# Patient Record
Sex: MALE | Race: WHITE | NOT HISPANIC OR LATINO | Employment: OTHER | URBAN - METROPOLITAN AREA
[De-identification: names, ages, dates, MRNs, and addresses within clinical notes are randomized per-mention and may not be internally consistent; named-entity substitution may affect disease eponyms.]

---

## 2017-01-18 ENCOUNTER — ALLSCRIPTS OFFICE VISIT (OUTPATIENT)
Dept: OTHER | Facility: OTHER | Age: 62
End: 2017-01-18

## 2017-01-18 LAB
BILIRUB UR QL STRIP: NORMAL
CLARITY UR: NORMAL
COLOR UR: YELLOW
GLUCOSE (HISTORICAL): NORMAL
HGB UR QL STRIP.AUTO: NORMAL
KETONES UR STRIP-MCNC: NORMAL MG/DL
LEUKOCYTE ESTERASE UR QL STRIP: NORMAL
NITRITE UR QL STRIP: NORMAL
PH UR STRIP.AUTO: 7.5 [PH]
PROT UR STRIP-MCNC: NORMAL MG/DL
SP GR UR STRIP.AUTO: 1
UROBILINOGEN UR QL STRIP.AUTO: NORMAL

## 2017-02-14 ENCOUNTER — TRANSCRIBE ORDERS (OUTPATIENT)
Dept: ADMINISTRATIVE | Facility: HOSPITAL | Age: 62
End: 2017-02-14

## 2017-02-14 ENCOUNTER — APPOINTMENT (OUTPATIENT)
Dept: LAB | Facility: MEDICAL CENTER | Age: 62
End: 2017-02-14
Payer: COMMERCIAL

## 2017-02-14 ENCOUNTER — ALLSCRIPTS OFFICE VISIT (OUTPATIENT)
Dept: OTHER | Facility: OTHER | Age: 62
End: 2017-02-14

## 2017-02-14 DIAGNOSIS — G35 MULTIPLE SCLEROSIS (HCC): Primary | ICD-10-CM

## 2017-02-14 DIAGNOSIS — R94.4 NONSPECIFIC ABNORMAL RESULTS OF KIDNEY FUNCTION STUDY: ICD-10-CM

## 2017-02-14 DIAGNOSIS — G35 MULTIPLE SCLEROSIS (HCC): ICD-10-CM

## 2017-02-14 LAB
ALBUMIN SERPL BCP-MCNC: 4 G/DL (ref 3.5–5)
ALP SERPL-CCNC: 95 U/L (ref 46–116)
ALT SERPL W P-5'-P-CCNC: 35 U/L (ref 12–78)
ANION GAP SERPL CALCULATED.3IONS-SCNC: 9 MMOL/L (ref 4–13)
AST SERPL W P-5'-P-CCNC: 30 U/L (ref 5–45)
BASOPHILS # BLD AUTO: 0.01 THOUSANDS/ΜL (ref 0–0.1)
BASOPHILS NFR BLD AUTO: 0 % (ref 0–1)
BILIRUB DIRECT SERPL-MCNC: 0.09 MG/DL (ref 0–0.2)
BILIRUB SERPL-MCNC: 0.19 MG/DL (ref 0.2–1)
BUN SERPL-MCNC: 16 MG/DL (ref 5–25)
CALCIUM SERPL-MCNC: 9.7 MG/DL (ref 8.3–10.1)
CHLORIDE SERPL-SCNC: 104 MMOL/L (ref 100–108)
CO2 SERPL-SCNC: 29 MMOL/L (ref 21–32)
CREAT SERPL-MCNC: 1.18 MG/DL (ref 0.6–1.3)
EOSINOPHIL # BLD AUTO: 0.06 THOUSAND/ΜL (ref 0–0.61)
EOSINOPHIL NFR BLD AUTO: 2 % (ref 0–6)
ERYTHROCYTE [DISTWIDTH] IN BLOOD BY AUTOMATED COUNT: 12.9 % (ref 11.6–15.1)
GFR SERPL CREATININE-BSD FRML MDRD: >60 ML/MIN/1.73SQ M
GLUCOSE SERPL-MCNC: 92 MG/DL (ref 65–140)
HCT VFR BLD AUTO: 35.9 % (ref 36.5–49.3)
HGB BLD-MCNC: 11.7 G/DL (ref 12–17)
LYMPHOCYTES # BLD AUTO: 1.02 THOUSANDS/ΜL (ref 0.6–4.47)
LYMPHOCYTES NFR BLD AUTO: 26 % (ref 14–44)
MCH RBC QN AUTO: 30.7 PG (ref 26.8–34.3)
MCHC RBC AUTO-ENTMCNC: 32.6 G/DL (ref 31.4–37.4)
MCV RBC AUTO: 94 FL (ref 82–98)
MONOCYTES # BLD AUTO: 0.43 THOUSAND/ΜL (ref 0.17–1.22)
MONOCYTES NFR BLD AUTO: 11 % (ref 4–12)
NEUTROPHILS # BLD AUTO: 2.45 THOUSANDS/ΜL (ref 1.85–7.62)
NEUTS SEG NFR BLD AUTO: 61 % (ref 43–75)
NRBC BLD AUTO-RTO: 0 /100 WBCS
PLATELET # BLD AUTO: 254 THOUSANDS/UL (ref 149–390)
PMV BLD AUTO: 10.5 FL (ref 8.9–12.7)
POTASSIUM SERPL-SCNC: 4.2 MMOL/L (ref 3.5–5.3)
PROT SERPL-MCNC: 8.4 G/DL (ref 6.4–8.2)
RBC # BLD AUTO: 3.81 MILLION/UL (ref 3.88–5.62)
SODIUM SERPL-SCNC: 142 MMOL/L (ref 136–145)
WBC # BLD AUTO: 3.97 THOUSAND/UL (ref 4.31–10.16)

## 2017-02-14 PROCEDURE — 36415 COLL VENOUS BLD VENIPUNCTURE: CPT

## 2017-02-14 PROCEDURE — 80177 DRUG SCRN QUAN LEVETIRACETAM: CPT

## 2017-02-14 PROCEDURE — 85025 COMPLETE CBC W/AUTO DIFF WBC: CPT

## 2017-02-14 PROCEDURE — 82248 BILIRUBIN DIRECT: CPT

## 2017-02-14 PROCEDURE — 80053 COMPREHEN METABOLIC PANEL: CPT

## 2017-02-15 ENCOUNTER — GENERIC CONVERSION - ENCOUNTER (OUTPATIENT)
Dept: OTHER | Facility: OTHER | Age: 62
End: 2017-02-15

## 2017-02-16 LAB — LEVETIRACETAM SERPL-MCNC: 26.6 UG/ML (ref 10–40)

## 2017-03-13 ENCOUNTER — APPOINTMENT (OUTPATIENT)
Dept: LAB | Facility: MEDICAL CENTER | Age: 62
End: 2017-03-13
Payer: COMMERCIAL

## 2017-03-13 ENCOUNTER — TRANSCRIBE ORDERS (OUTPATIENT)
Dept: ADMINISTRATIVE | Facility: HOSPITAL | Age: 62
End: 2017-03-13

## 2017-03-13 DIAGNOSIS — Z79.891 ENCOUNTER FOR LONG-TERM METHADONE USE: ICD-10-CM

## 2017-03-13 DIAGNOSIS — R94.4 NONSPECIFIC ABNORMAL RESULTS OF KIDNEY FUNCTION STUDY: ICD-10-CM

## 2017-03-13 DIAGNOSIS — Z79.891 ENCOUNTER FOR LONG-TERM METHADONE USE: Primary | ICD-10-CM

## 2017-03-13 LAB
ALBUMIN SERPL BCP-MCNC: 3.9 G/DL (ref 3.5–5)
ALP SERPL-CCNC: 81 U/L (ref 46–116)
ALT SERPL W P-5'-P-CCNC: 23 U/L (ref 12–78)
ANION GAP SERPL CALCULATED.3IONS-SCNC: 9 MMOL/L (ref 4–13)
AST SERPL W P-5'-P-CCNC: 18 U/L (ref 5–45)
BASOPHILS # BLD AUTO: 0.05 THOUSANDS/ΜL (ref 0–0.1)
BASOPHILS NFR BLD AUTO: 1 % (ref 0–1)
BILIRUB DIRECT SERPL-MCNC: 0.1 MG/DL (ref 0–0.2)
BILIRUB SERPL-MCNC: 0.22 MG/DL (ref 0.2–1)
BUN SERPL-MCNC: 19 MG/DL (ref 5–25)
CALCIUM SERPL-MCNC: 9.9 MG/DL (ref 8.3–10.1)
CHLORIDE SERPL-SCNC: 100 MMOL/L (ref 100–108)
CO2 SERPL-SCNC: 31 MMOL/L (ref 21–32)
CREAT SERPL-MCNC: 1.26 MG/DL (ref 0.6–1.3)
EOSINOPHIL # BLD AUTO: 0.04 THOUSAND/ΜL (ref 0–0.61)
EOSINOPHIL NFR BLD AUTO: 1 % (ref 0–6)
ERYTHROCYTE [DISTWIDTH] IN BLOOD BY AUTOMATED COUNT: 12.4 % (ref 11.6–15.1)
GFR SERPL CREATININE-BSD FRML MDRD: 57.6 ML/MIN/1.73SQ M
GLUCOSE SERPL-MCNC: 102 MG/DL (ref 65–140)
HCT VFR BLD AUTO: 37.1 % (ref 36.5–49.3)
HGB BLD-MCNC: 12.2 G/DL (ref 12–17)
LYMPHOCYTES # BLD AUTO: 1.15 THOUSANDS/ΜL (ref 0.6–4.47)
LYMPHOCYTES NFR BLD AUTO: 21 % (ref 14–44)
MCH RBC QN AUTO: 31 PG (ref 26.8–34.3)
MCHC RBC AUTO-ENTMCNC: 32.9 G/DL (ref 31.4–37.4)
MCV RBC AUTO: 94 FL (ref 82–98)
MONOCYTES # BLD AUTO: 0.58 THOUSAND/ΜL (ref 0.17–1.22)
MONOCYTES NFR BLD AUTO: 10 % (ref 4–12)
NEUTROPHILS # BLD AUTO: 3.77 THOUSANDS/ΜL (ref 1.85–7.62)
NEUTS SEG NFR BLD AUTO: 67 % (ref 43–75)
NRBC BLD AUTO-RTO: 0 /100 WBCS
PLATELET # BLD AUTO: 307 THOUSANDS/UL (ref 149–390)
PMV BLD AUTO: 10.7 FL (ref 8.9–12.7)
POTASSIUM SERPL-SCNC: 4.5 MMOL/L (ref 3.5–5.3)
PROT SERPL-MCNC: 8.9 G/DL (ref 6.4–8.2)
RBC # BLD AUTO: 3.94 MILLION/UL (ref 3.88–5.62)
SODIUM SERPL-SCNC: 140 MMOL/L (ref 136–145)
WBC # BLD AUTO: 5.6 THOUSAND/UL (ref 4.31–10.16)

## 2017-03-13 PROCEDURE — 36415 COLL VENOUS BLD VENIPUNCTURE: CPT

## 2017-03-13 PROCEDURE — 82248 BILIRUBIN DIRECT: CPT

## 2017-03-13 PROCEDURE — 85025 COMPLETE CBC W/AUTO DIFF WBC: CPT

## 2017-03-13 PROCEDURE — 80053 COMPREHEN METABOLIC PANEL: CPT

## 2017-06-08 ENCOUNTER — APPOINTMENT (EMERGENCY)
Dept: RADIOLOGY | Facility: HOSPITAL | Age: 62
End: 2017-06-08
Payer: COMMERCIAL

## 2017-06-08 ENCOUNTER — HOSPITAL ENCOUNTER (EMERGENCY)
Facility: HOSPITAL | Age: 62
Discharge: HOME | End: 2017-06-08
Attending: EMERGENCY MEDICINE
Payer: COMMERCIAL

## 2017-06-08 ENCOUNTER — ALLSCRIPTS OFFICE VISIT (OUTPATIENT)
Dept: OTHER | Facility: OTHER | Age: 62
End: 2017-06-08

## 2017-06-08 VITALS
TEMPERATURE: 99 F | RESPIRATION RATE: 16 BRPM | SYSTOLIC BLOOD PRESSURE: 115 MMHG | HEART RATE: 80 BPM | WEIGHT: 142.7 LBS | BODY MASS INDEX: 22.93 KG/M2 | HEIGHT: 66 IN | DIASTOLIC BLOOD PRESSURE: 60 MMHG | OXYGEN SATURATION: 95 %

## 2017-06-08 DIAGNOSIS — R53.1 WEAKNESS: ICD-10-CM

## 2017-06-08 DIAGNOSIS — R50.9 FEVER: Primary | ICD-10-CM

## 2017-06-08 LAB
ALBUMIN SERPL BCP-MCNC: 4 G/DL (ref 3.5–5)
ALP SERPL-CCNC: 87 U/L (ref 46–116)
ALT SERPL W P-5'-P-CCNC: 30 U/L (ref 12–78)
ANION GAP SERPL CALCULATED.3IONS-SCNC: 8 MMOL/L (ref 4–13)
APTT PPP: 35 SECONDS (ref 23–35)
AST SERPL W P-5'-P-CCNC: 29 U/L (ref 5–45)
ATRIAL RATE: 95 BPM
BACTERIA UR QL AUTO: NORMAL /HPF
BASOPHILS # BLD MANUAL: 0 THOUSAND/UL (ref 0–0.1)
BASOPHILS NFR MAR MANUAL: 0 % (ref 0–1)
BILIRUB SERPL-MCNC: 0.3 MG/DL (ref 0.2–1)
BILIRUB UR QL STRIP: NEGATIVE
BUN SERPL-MCNC: 23 MG/DL (ref 5–25)
CALCIUM SERPL-MCNC: 9.3 MG/DL (ref 8.3–10.1)
CHLORIDE SERPL-SCNC: 102 MMOL/L (ref 100–108)
CLARITY UR: CLEAR
CO2 SERPL-SCNC: 30 MMOL/L (ref 21–32)
COLOR UR: YELLOW
COLOR, POC: YELLOW
CREAT SERPL-MCNC: 1.32 MG/DL (ref 0.6–1.3)
EOSINOPHIL # BLD MANUAL: 0 THOUSAND/UL (ref 0–0.4)
EOSINOPHIL NFR BLD MANUAL: 0 % (ref 0–6)
ERYTHROCYTE [DISTWIDTH] IN BLOOD BY AUTOMATED COUNT: 12.1 % (ref 11.6–15.1)
GFR SERPL CREATININE-BSD FRML MDRD: 54.6 ML/MIN/1.73SQ M
GLUCOSE SERPL-MCNC: 130 MG/DL (ref 65–140)
GLUCOSE UR STRIP-MCNC: NEGATIVE MG/DL
HCT VFR BLD AUTO: 34.2 % (ref 36.5–49.3)
HGB BLD-MCNC: 11.8 G/DL (ref 12–17)
HGB UR QL STRIP.AUTO: NEGATIVE
INR PPP: 1.05 (ref 0.86–1.16)
KETONES UR STRIP-MCNC: NEGATIVE MG/DL
LACTATE SERPL-SCNC: 1.4 MMOL/L (ref 0.5–2)
LEUKOCYTE ESTERASE UR QL STRIP: NEGATIVE
LYMPHOCYTES # BLD AUTO: 0.56 THOUSAND/UL (ref 0.6–4.47)
LYMPHOCYTES # BLD AUTO: 4 % (ref 14–44)
MCH RBC QN AUTO: 32.1 PG (ref 26.8–34.3)
MCHC RBC AUTO-ENTMCNC: 34.5 G/DL (ref 31.4–37.4)
MCV RBC AUTO: 93 FL (ref 82–98)
MONOCYTES # BLD AUTO: 0.98 THOUSAND/UL (ref 0–1.22)
MONOCYTES NFR BLD: 7 % (ref 4–12)
NEUTROPHILS # BLD MANUAL: 12.49 THOUSAND/UL (ref 1.85–7.62)
NEUTS BAND NFR BLD MANUAL: 8 % (ref 0–8)
NEUTS SEG NFR BLD AUTO: 81 % (ref 43–75)
NITRITE UR QL STRIP: NEGATIVE
NON-SQ EPI CELLS URNS QL MICRO: NORMAL /HPF
NRBC BLD AUTO-RTO: 0 /100 WBCS
P AXIS: 75 DEGREES
PH UR STRIP.AUTO: 6 [PH] (ref 4.5–8)
PLATELET # BLD AUTO: 214 THOUSANDS/UL (ref 149–390)
PLATELET BLD QL SMEAR: ADEQUATE
PMV BLD AUTO: 10.1 FL (ref 8.9–12.7)
POTASSIUM SERPL-SCNC: 4.3 MMOL/L (ref 3.5–5.3)
PR INTERVAL: 168 MS
PROT SERPL-MCNC: 8.1 G/DL (ref 6.4–8.2)
PROT UR STRIP-MCNC: ABNORMAL MG/DL
PROTHROMBIN TIME: 13.7 SECONDS (ref 12.1–14.4)
QRS AXIS: 59 DEGREES
QRSD INTERVAL: 80 MS
QT INTERVAL: 330 MS
QTC INTERVAL: 414 MS
RBC # BLD AUTO: 3.68 MILLION/UL (ref 3.88–5.62)
RBC #/AREA URNS AUTO: NORMAL /HPF
RBC MORPH BLD: NORMAL
SODIUM SERPL-SCNC: 140 MMOL/L (ref 136–145)
SP GR UR STRIP.AUTO: 1.01 (ref 1–1.03)
T WAVE AXIS: 57 DEGREES
TOTAL CELLS COUNTED SPEC: 100
UROBILINOGEN UR QL STRIP.AUTO: 0.2 E.U./DL
VENTRICULAR RATE: 95 BPM
WBC # BLD AUTO: 14.03 THOUSAND/UL (ref 4.31–10.16)
WBC #/AREA URNS AUTO: NORMAL /HPF

## 2017-06-08 PROCEDURE — 85730 THROMBOPLASTIN TIME PARTIAL: CPT | Performed by: EMERGENCY MEDICINE

## 2017-06-08 PROCEDURE — 87040 BLOOD CULTURE FOR BACTERIA: CPT

## 2017-06-08 PROCEDURE — 81001 URINALYSIS AUTO W/SCOPE: CPT

## 2017-06-08 PROCEDURE — 71020 HB CHEST X-RAY 2VW FRONTAL&LATL: CPT

## 2017-06-08 PROCEDURE — 85027 COMPLETE CBC AUTOMATED: CPT | Performed by: EMERGENCY MEDICINE

## 2017-06-08 PROCEDURE — 85610 PROTHROMBIN TIME: CPT | Performed by: EMERGENCY MEDICINE

## 2017-06-08 PROCEDURE — 85007 BL SMEAR W/DIFF WBC COUNT: CPT | Performed by: EMERGENCY MEDICINE

## 2017-06-08 PROCEDURE — 93005 ELECTROCARDIOGRAM TRACING: CPT

## 2017-06-08 PROCEDURE — 83605 ASSAY OF LACTIC ACID: CPT | Performed by: EMERGENCY MEDICINE

## 2017-06-08 PROCEDURE — 99284 EMERGENCY DEPT VISIT MOD MDM: CPT

## 2017-06-08 PROCEDURE — 96360 HYDRATION IV INFUSION INIT: CPT

## 2017-06-08 PROCEDURE — 36415 COLL VENOUS BLD VENIPUNCTURE: CPT

## 2017-06-08 PROCEDURE — 80053 COMPREHEN METABOLIC PANEL: CPT

## 2017-06-08 PROCEDURE — 81002 URINALYSIS NONAUTO W/O SCOPE: CPT | Performed by: EMERGENCY MEDICINE

## 2017-06-08 RX ORDER — ACETAMINOPHEN 325 MG/1
650 TABLET ORAL ONCE
Status: COMPLETED | OUTPATIENT
Start: 2017-06-08 | End: 2017-06-08

## 2017-06-08 RX ORDER — TIZANIDINE HYDROCHLORIDE 4 MG/1
4 CAPSULE, GELATIN COATED ORAL
COMMUNITY
End: 2018-09-11 | Stop reason: HOSPADM

## 2017-06-08 RX ORDER — MULTIVITAMIN
1 TABLET ORAL DAILY
COMMUNITY

## 2017-06-08 RX ORDER — DALFAMPRIDINE 10 MG/1
TABLET, FILM COATED, EXTENDED RELEASE ORAL
COMMUNITY
End: 2018-02-06 | Stop reason: SDUPTHER

## 2017-06-08 RX ORDER — OXYCODONE HYDROCHLORIDE AND ACETAMINOPHEN 5; 325 MG/1; MG/1
1 TABLET ORAL EVERY 6 HOURS
COMMUNITY
End: 2017-10-20

## 2017-06-08 RX ADMIN — SODIUM CHLORIDE 1000 ML: 0.9 INJECTION, SOLUTION INTRAVENOUS at 16:48

## 2017-06-08 RX ADMIN — ACETAMINOPHEN 650 MG: 325 TABLET, FILM COATED ORAL at 16:52

## 2017-06-13 LAB
BACTERIA BLD CULT: NORMAL
BACTERIA BLD CULT: NORMAL

## 2017-09-11 ENCOUNTER — ALLSCRIPTS OFFICE VISIT (OUTPATIENT)
Dept: OTHER | Facility: OTHER | Age: 62
End: 2017-09-11

## 2017-10-20 ENCOUNTER — APPOINTMENT (EMERGENCY)
Dept: MRI IMAGING | Facility: HOSPITAL | Age: 62
DRG: 074 | End: 2017-10-20
Payer: COMMERCIAL

## 2017-10-20 ENCOUNTER — APPOINTMENT (EMERGENCY)
Dept: RADIOLOGY | Facility: HOSPITAL | Age: 62
DRG: 074 | End: 2017-10-20
Payer: COMMERCIAL

## 2017-10-20 ENCOUNTER — HOSPITAL ENCOUNTER (INPATIENT)
Facility: HOSPITAL | Age: 62
LOS: 3 days | Discharge: HOME | DRG: 074 | End: 2017-10-23
Attending: EMERGENCY MEDICINE | Admitting: INTERNAL MEDICINE
Payer: COMMERCIAL

## 2017-10-20 DIAGNOSIS — D84.9 IMMUNOCOMPROMISED STATE (HCC): ICD-10-CM

## 2017-10-20 DIAGNOSIS — G35 MULTIPLE SCLEROSIS (HCC): ICD-10-CM

## 2017-10-20 DIAGNOSIS — R50.9 FEVER: Primary | ICD-10-CM

## 2017-10-20 LAB
ALBUMIN SERPL BCP-MCNC: 4.1 G/DL (ref 3.5–5)
ALP SERPL-CCNC: 88 U/L (ref 46–116)
ALT SERPL W P-5'-P-CCNC: 32 U/L (ref 12–78)
ANION GAP BLD CALC-SCNC: 15 MMOL/L (ref 4–13)
ANION GAP SERPL CALCULATED.3IONS-SCNC: 7 MMOL/L (ref 4–13)
APTT PPP: 29 SECONDS (ref 23–35)
AST SERPL W P-5'-P-CCNC: 30 U/L (ref 5–45)
BACTERIA UR QL AUTO: ABNORMAL /HPF
BASOPHILS # BLD MANUAL: 0 THOUSAND/UL (ref 0–0.1)
BASOPHILS NFR MAR MANUAL: 0 % (ref 0–1)
BILIRUB SERPL-MCNC: 0.28 MG/DL (ref 0.2–1)
BILIRUB UR QL STRIP: NEGATIVE
BUN BLD-MCNC: 22 MG/DL (ref 5–25)
BUN SERPL-MCNC: 21 MG/DL (ref 5–25)
CA-I BLD-SCNC: 1.19 MMOL/L (ref 1.12–1.32)
CALCIUM SERPL-MCNC: 9.9 MG/DL (ref 8.3–10.1)
CHLORIDE BLD-SCNC: 100 MMOL/L (ref 100–108)
CHLORIDE SERPL-SCNC: 100 MMOL/L (ref 100–108)
CLARITY UR: CLEAR
CO2 SERPL-SCNC: 31 MMOL/L (ref 21–32)
COLOR UR: YELLOW
CREAT BLD-MCNC: 1.2 MG/DL (ref 0.6–1.3)
CREAT SERPL-MCNC: 1.2 MG/DL (ref 0.6–1.3)
EOSINOPHIL # BLD MANUAL: 0.12 THOUSAND/UL (ref 0–0.4)
EOSINOPHIL NFR BLD MANUAL: 1 % (ref 0–6)
GFR SERPL CREATININE-BSD FRML MDRD: 64 ML/MIN/1.73SQ M
GFR SERPL CREATININE-BSD FRML MDRD: 64 ML/MIN/1.73SQ M
GLUCOSE SERPL-MCNC: 109 MG/DL (ref 65–140)
GLUCOSE SERPL-MCNC: 115 MG/DL (ref 65–140)
GLUCOSE UR STRIP-MCNC: NEGATIVE MG/DL
HCT VFR BLD AUTO: 37.4 % (ref 36.5–49.3)
HCT VFR BLD CALC: 40 % (ref 36.5–49.3)
HGB BLD-MCNC: 12.5 G/DL (ref 12–17)
HGB BLDA-MCNC: 13.6 G/DL (ref 12–17)
HGB UR QL STRIP.AUTO: NEGATIVE
INR PPP: 0.92 (ref 0.86–1.16)
KETONES UR STRIP-MCNC: ABNORMAL MG/DL
LACTATE SERPL-SCNC: 1.1 MMOL/L (ref 0.5–2)
LEUKOCYTE ESTERASE UR QL STRIP: NEGATIVE
LYMPHOCYTES # BLD AUTO: 0.6 THOUSAND/UL (ref 0.6–4.47)
LYMPHOCYTES # BLD AUTO: 5 % (ref 14–44)
MCH RBC QN AUTO: 31.5 PG (ref 26.8–34.3)
MCHC RBC AUTO-ENTMCNC: 33.4 G/DL (ref 31.4–37.4)
MCV RBC AUTO: 94 FL (ref 82–98)
MONOCYTES # BLD AUTO: 0.6 THOUSAND/UL (ref 0–1.22)
MONOCYTES NFR BLD: 5 % (ref 4–12)
NEUTROPHILS # BLD MANUAL: 10.76 THOUSAND/UL (ref 1.85–7.62)
NEUTS BAND NFR BLD MANUAL: 4 % (ref 0–8)
NEUTS SEG NFR BLD AUTO: 85 % (ref 43–75)
NITRITE UR QL STRIP: NEGATIVE
NON-SQ EPI CELLS URNS QL MICRO: ABNORMAL /HPF
PCO2 BLD: 30 MMOL/L (ref 21–32)
PH UR STRIP.AUTO: 7 [PH] (ref 4.5–8)
PLATELET # BLD AUTO: 269 THOUSANDS/UL (ref 149–390)
PLATELET BLD QL SMEAR: ADEQUATE
POTASSIUM BLD-SCNC: 4 MMOL/L (ref 3.5–5.3)
POTASSIUM SERPL-SCNC: 3.9 MMOL/L (ref 3.5–5.3)
PROT SERPL-MCNC: 8.9 G/DL (ref 6.4–8.2)
PROT UR STRIP-MCNC: ABNORMAL MG/DL
PROTHROMBIN TIME: 12.4 SECONDS (ref 12.1–14.4)
RBC # BLD AUTO: 3.97 MILLION/UL (ref 3.88–5.62)
RBC #/AREA URNS AUTO: ABNORMAL /HPF
RBC MORPH BLD: NORMAL
SODIUM BLD-SCNC: 140 MMOL/L (ref 136–145)
SODIUM SERPL-SCNC: 138 MMOL/L (ref 136–145)
SP GR UR STRIP.AUTO: 1.02 (ref 1–1.03)
SPECIMEN SOURCE: ABNORMAL
TOTAL CELLS COUNTED SPEC: 100
UROBILINOGEN UR QL STRIP.AUTO: 0.2 E.U./DL
WBC # BLD AUTO: 12.09 THOUSAND/UL (ref 4.31–10.16)
WBC #/AREA URNS AUTO: ABNORMAL /HPF

## 2017-10-20 PROCEDURE — 85007 BL SMEAR W/DIFF WBC COUNT: CPT | Performed by: EMERGENCY MEDICINE

## 2017-10-20 PROCEDURE — 81002 URINALYSIS NONAUTO W/O SCOPE: CPT | Performed by: EMERGENCY MEDICINE

## 2017-10-20 PROCEDURE — 96365 THER/PROPH/DIAG IV INF INIT: CPT

## 2017-10-20 PROCEDURE — 83605 ASSAY OF LACTIC ACID: CPT | Performed by: EMERGENCY MEDICINE

## 2017-10-20 PROCEDURE — 85730 THROMBOPLASTIN TIME PARTIAL: CPT | Performed by: EMERGENCY MEDICINE

## 2017-10-20 PROCEDURE — A9585 GADOBUTROL INJECTION: HCPCS | Performed by: EMERGENCY MEDICINE

## 2017-10-20 PROCEDURE — 85014 HEMATOCRIT: CPT

## 2017-10-20 PROCEDURE — 96360 HYDRATION IV INFUSION INIT: CPT

## 2017-10-20 PROCEDURE — 80047 BASIC METABLC PNL IONIZED CA: CPT

## 2017-10-20 PROCEDURE — 72158 MRI LUMBAR SPINE W/O & W/DYE: CPT

## 2017-10-20 PROCEDURE — 80053 COMPREHEN METABOLIC PANEL: CPT | Performed by: EMERGENCY MEDICINE

## 2017-10-20 PROCEDURE — 87040 BLOOD CULTURE FOR BACTERIA: CPT | Performed by: EMERGENCY MEDICINE

## 2017-10-20 PROCEDURE — 96361 HYDRATE IV INFUSION ADD-ON: CPT

## 2017-10-20 PROCEDURE — 85610 PROTHROMBIN TIME: CPT | Performed by: EMERGENCY MEDICINE

## 2017-10-20 PROCEDURE — 72157 MRI CHEST SPINE W/O & W/DYE: CPT

## 2017-10-20 PROCEDURE — 36415 COLL VENOUS BLD VENIPUNCTURE: CPT | Performed by: EMERGENCY MEDICINE

## 2017-10-20 PROCEDURE — 85027 COMPLETE CBC AUTOMATED: CPT | Performed by: EMERGENCY MEDICINE

## 2017-10-20 PROCEDURE — 81001 URINALYSIS AUTO W/SCOPE: CPT

## 2017-10-20 PROCEDURE — 71020 HB CHEST X-RAY 2VW FRONTAL&LATL: CPT

## 2017-10-20 RX ORDER — ACETAMINOPHEN 325 MG/1
650 TABLET ORAL ONCE
Status: COMPLETED | OUTPATIENT
Start: 2017-10-20 | End: 2017-10-20

## 2017-10-20 RX ORDER — HYDROCODONE BITARTRATE AND ACETAMINOPHEN 5; 325 MG/1; MG/1
1 TABLET ORAL EVERY 6 HOURS PRN
COMMUNITY
End: 2018-09-06 | Stop reason: ALTCHOICE

## 2017-10-20 RX ORDER — OXYCODONE HYDROCHLORIDE 10 MG/1
10 TABLET ORAL ONCE
Status: COMPLETED | OUTPATIENT
Start: 2017-10-20 | End: 2017-10-20

## 2017-10-20 RX ORDER — 0.9 % SODIUM CHLORIDE 0.9 %
3 VIAL (ML) INJECTION AS NEEDED
Status: DISCONTINUED | OUTPATIENT
Start: 2017-10-20 | End: 2017-10-20

## 2017-10-20 RX ORDER — CELECOXIB 200 MG/1
200 CAPSULE ORAL 2 TIMES DAILY
COMMUNITY

## 2017-10-20 RX ADMIN — GADOBUTROL 6 ML: 604.72 INJECTION INTRAVENOUS at 21:33

## 2017-10-20 RX ADMIN — ACETAMINOPHEN 650 MG: 325 TABLET, FILM COATED ORAL at 19:32

## 2017-10-20 RX ADMIN — CEFEPIME 2000 MG: 2 INJECTION, POWDER, FOR SOLUTION INTRAMUSCULAR; INTRAVENOUS at 22:58

## 2017-10-20 RX ADMIN — SODIUM CHLORIDE 1000 ML: 0.9 INJECTION, SOLUTION INTRAVENOUS at 19:32

## 2017-10-20 RX ADMIN — SODIUM CHLORIDE 3 ML: 9 INJECTION, SOLUTION INTRAMUSCULAR; INTRAVENOUS; SUBCUTANEOUS at 19:48

## 2017-10-20 RX ADMIN — OXYCODONE HYDROCHLORIDE 10 MG: 10 TABLET ORAL at 19:32

## 2017-10-21 PROBLEM — R50.9 FEVER: Status: ACTIVE | Noted: 2017-10-21

## 2017-10-21 PROBLEM — D84.9 IMMUNOCOMPROMISED STATE (HCC): Status: ACTIVE | Noted: 2017-10-21

## 2017-10-21 LAB
ANION GAP SERPL CALCULATED.3IONS-SCNC: 3 MMOL/L (ref 4–13)
BUN SERPL-MCNC: 23 MG/DL (ref 5–25)
CALCIUM SERPL-MCNC: 8.9 MG/DL (ref 8.3–10.1)
CHLORIDE SERPL-SCNC: 103 MMOL/L (ref 100–108)
CO2 SERPL-SCNC: 31 MMOL/L (ref 21–32)
CREAT SERPL-MCNC: 1.36 MG/DL (ref 0.6–1.3)
ERYTHROCYTE [DISTWIDTH] IN BLOOD BY AUTOMATED COUNT: 12.5 % (ref 11.6–15.1)
GFR SERPL CREATININE-BSD FRML MDRD: 55 ML/MIN/1.73SQ M
GLUCOSE SERPL-MCNC: 85 MG/DL (ref 65–140)
HCT VFR BLD AUTO: 32.2 % (ref 36.5–49.3)
HGB BLD-MCNC: 10.7 G/DL (ref 12–17)
MCH RBC QN AUTO: 31.6 PG (ref 26.8–34.3)
MCHC RBC AUTO-ENTMCNC: 33.2 G/DL (ref 31.4–37.4)
MCV RBC AUTO: 95 FL (ref 82–98)
PLATELET # BLD AUTO: 225 THOUSANDS/UL (ref 149–390)
PMV BLD AUTO: 10 FL (ref 8.9–12.7)
POTASSIUM SERPL-SCNC: 4.4 MMOL/L (ref 3.5–5.3)
RBC # BLD AUTO: 3.39 MILLION/UL (ref 3.88–5.62)
SODIUM SERPL-SCNC: 137 MMOL/L (ref 136–145)
WBC # BLD AUTO: 9.73 THOUSAND/UL (ref 4.31–10.16)

## 2017-10-21 PROCEDURE — 99285 EMERGENCY DEPT VISIT HI MDM: CPT

## 2017-10-21 PROCEDURE — 85027 COMPLETE CBC AUTOMATED: CPT | Performed by: PHYSICIAN ASSISTANT

## 2017-10-21 PROCEDURE — 80048 BASIC METABOLIC PNL TOTAL CA: CPT | Performed by: PHYSICIAN ASSISTANT

## 2017-10-21 RX ORDER — OXYCODONE HCL 20 MG/1
20 TABLET, FILM COATED, EXTENDED RELEASE ORAL EVERY 8 HOURS SCHEDULED
Status: DISCONTINUED | OUTPATIENT
Start: 2017-10-21 | End: 2017-10-21

## 2017-10-21 RX ORDER — CLONAZEPAM 0.5 MG/1
0.5 TABLET ORAL DAILY
Status: DISCONTINUED | OUTPATIENT
Start: 2017-10-21 | End: 2017-10-21

## 2017-10-21 RX ORDER — BACLOFEN 10 MG/1
20 TABLET ORAL 3 TIMES DAILY
Status: DISCONTINUED | OUTPATIENT
Start: 2017-10-21 | End: 2017-10-21

## 2017-10-21 RX ORDER — CITALOPRAM 20 MG/1
40 TABLET ORAL DAILY
Status: DISCONTINUED | OUTPATIENT
Start: 2017-10-22 | End: 2017-10-23 | Stop reason: HOSPADM

## 2017-10-21 RX ORDER — CELECOXIB 200 MG/1
200 CAPSULE ORAL DAILY
Status: DISCONTINUED | OUTPATIENT
Start: 2017-10-21 | End: 2017-10-23 | Stop reason: HOSPADM

## 2017-10-21 RX ORDER — GABAPENTIN 300 MG/1
300 CAPSULE ORAL DAILY
Status: DISCONTINUED | OUTPATIENT
Start: 2017-10-21 | End: 2017-10-21

## 2017-10-21 RX ORDER — OXYCODONE HCL 20 MG/1
20 TABLET, FILM COATED, EXTENDED RELEASE ORAL EVERY 12 HOURS SCHEDULED
Status: DISCONTINUED | OUTPATIENT
Start: 2017-10-21 | End: 2017-10-21

## 2017-10-21 RX ORDER — LACTOBACILLUS ACIDOPHILUS / LACTOBACILLUS BULGARICUS 100 MILLION CFU STRENGTH
1 GRANULES ORAL
Status: DISCONTINUED | OUTPATIENT
Start: 2017-10-21 | End: 2017-10-23 | Stop reason: HOSPADM

## 2017-10-21 RX ORDER — HEPARIN SODIUM 5000 [USP'U]/ML
5000 INJECTION, SOLUTION INTRAVENOUS; SUBCUTANEOUS EVERY 8 HOURS SCHEDULED
Status: DISCONTINUED | OUTPATIENT
Start: 2017-10-21 | End: 2017-10-23 | Stop reason: HOSPADM

## 2017-10-21 RX ORDER — HYDROCODONE BITARTRATE AND ACETAMINOPHEN 5; 325 MG/1; MG/1
1 TABLET ORAL EVERY 6 HOURS PRN
Status: DISCONTINUED | OUTPATIENT
Start: 2017-10-21 | End: 2017-10-23 | Stop reason: HOSPADM

## 2017-10-21 RX ORDER — GABAPENTIN 300 MG/1
300 CAPSULE ORAL
Status: DISCONTINUED | OUTPATIENT
Start: 2017-10-21 | End: 2017-10-23 | Stop reason: HOSPADM

## 2017-10-21 RX ORDER — TIZANIDINE 4 MG/1
4 TABLET ORAL 3 TIMES DAILY
Status: DISCONTINUED | OUTPATIENT
Start: 2017-10-21 | End: 2017-10-23 | Stop reason: HOSPADM

## 2017-10-21 RX ORDER — OXYCODONE HCL 20 MG/1
20 TABLET, FILM COATED, EXTENDED RELEASE ORAL EVERY 12 HOURS SCHEDULED
Status: DISCONTINUED | OUTPATIENT
Start: 2017-10-21 | End: 2017-10-23 | Stop reason: HOSPADM

## 2017-10-21 RX ORDER — BACLOFEN 10 MG/1
20 TABLET ORAL
Status: DISCONTINUED | OUTPATIENT
Start: 2017-10-21 | End: 2017-10-23 | Stop reason: HOSPADM

## 2017-10-21 RX ORDER — ACETAMINOPHEN 325 MG/1
650 TABLET ORAL EVERY 6 HOURS PRN
Status: DISCONTINUED | OUTPATIENT
Start: 2017-10-21 | End: 2017-10-23 | Stop reason: HOSPADM

## 2017-10-21 RX ORDER — LEVETIRACETAM 750 MG/1
750 TABLET ORAL 2 TIMES DAILY
Status: DISCONTINUED | OUTPATIENT
Start: 2017-10-21 | End: 2017-10-23 | Stop reason: HOSPADM

## 2017-10-21 RX ORDER — CITALOPRAM 20 MG/1
20 TABLET ORAL DAILY
Status: DISCONTINUED | OUTPATIENT
Start: 2017-10-21 | End: 2017-10-21

## 2017-10-21 RX ADMIN — HEPARIN SODIUM 5000 UNITS: 5000 INJECTION, SOLUTION INTRAVENOUS; SUBCUTANEOUS at 02:04

## 2017-10-21 RX ADMIN — BACLOFEN 20 MG: 10 TABLET ORAL at 21:11

## 2017-10-21 RX ADMIN — LEVETIRACETAM 750 MG: 750 TABLET ORAL at 17:04

## 2017-10-21 RX ADMIN — BACLOFEN 20 MG: 10 TABLET ORAL at 03:56

## 2017-10-21 RX ADMIN — CLONAZEPAM 0.5 MG: 0.5 TABLET ORAL at 02:49

## 2017-10-21 RX ADMIN — CITALOPRAM HYDROBROMIDE 20 MG: 20 TABLET ORAL at 08:31

## 2017-10-21 RX ADMIN — HEPARIN SODIUM 5000 UNITS: 5000 INJECTION, SOLUTION INTRAVENOUS; SUBCUTANEOUS at 21:11

## 2017-10-21 RX ADMIN — CLONAZEPAM 1.5 MG: 1 TABLET ORAL at 21:12

## 2017-10-21 RX ADMIN — OXYCODONE HYDROCHLORIDE 20 MG: 20 TABLET, FILM COATED, EXTENDED RELEASE ORAL at 02:39

## 2017-10-21 RX ADMIN — TIZANIDINE 4 MG: 4 TABLET ORAL at 17:04

## 2017-10-21 RX ADMIN — OXYCODONE HYDROCHLORIDE 20 MG: 20 TABLET, FILM COATED, EXTENDED RELEASE ORAL at 21:11

## 2017-10-21 RX ADMIN — LEVETIRACETAM 750 MG: 750 TABLET ORAL at 08:31

## 2017-10-21 RX ADMIN — TIZANIDINE 4 MG: 4 TABLET ORAL at 21:12

## 2017-10-21 RX ADMIN — ZINC 1 TABLET: TAB ORAL at 08:31

## 2017-10-21 RX ADMIN — CELECOXIB 200 MG: 200 CAPSULE ORAL at 08:36

## 2017-10-21 RX ADMIN — GABAPENTIN 300 MG: 300 CAPSULE ORAL at 21:12

## 2017-10-21 RX ADMIN — CEFEPIME HYDROCHLORIDE 1000 MG: 1 INJECTION, POWDER, FOR SOLUTION INTRAMUSCULAR; INTRAVENOUS at 10:53

## 2017-10-21 RX ADMIN — OXYCODONE HYDROCHLORIDE 20 MG: 20 TABLET, FILM COATED, EXTENDED RELEASE ORAL at 10:53

## 2017-10-21 RX ADMIN — TIZANIDINE 4 MG: 4 TABLET ORAL at 03:56

## 2017-10-21 RX ADMIN — HEPARIN SODIUM 5000 UNITS: 5000 INJECTION, SOLUTION INTRAVENOUS; SUBCUTANEOUS at 14:04

## 2017-10-21 NOTE — ED PROVIDER NOTES
History  Chief Complaint   Patient presents with    Back Pain     hx of chronic back pain states took his 20 mg oxycodone later then he normaly would and the pain became unbearable, per EMS pt had fever of 102 5 pre hospital       This is a 57-year-old male with a history of MS presenting to the emergency department for evaluation of fever and back pain  The patient states that over the past 2-3 days he has had fevers and chills which had been responding to Tylenol additionally today he has an exacerbation of chronic thoracic and lumbar back pain  This is central back pain without radiation  He has no neurological complaints at this time the pain does not radiate into his legs  He has no bowel or bladder dysfunction  He is on immune modulating drugs for his MS  He denies any abdominal pain nausea vomiting  He has no chest pain shortness of breath headaches blurry vision or double vision  Prior to Admission Medications   Prescriptions Last Dose Informant Patient Reported? Taking? CLONAZEPAM PO   Yes Yes   Sig: Take 0 5 mg by mouth daily     Calcium Citrate-Vitamin D (CALCIUM + D PO)   Yes Yes   Sig: Take by mouth   Dalfampridine (AMPYRA PO)   Yes Yes   Sig: Take 10 mg by mouth 2 (two) times a day     Dalfampridine ER (AMPYRA) 10 MG TB12   Yes Yes   Sig: Take by mouth   HYDROcodone-acetaminophen (NORCO) 5-325 mg per tablet   Yes Yes   Sig: Take 1 tablet by mouth every 6 (six) hours as needed for pain   Interferon Beta-1a (REBIF REBIDOSE) 44 MCG/0 5ML SOAJ   Yes Yes   Sig: Inject under the skin 3 (three) times a week   Lactobacillus (ACIDOPHILUS PROBIOTIC) TABS   Yes Yes   Sig: Take by mouth   Multiple Vitamin (MULTIVITAMIN) tablet   Yes Yes   Sig: Take 1 tablet by mouth daily   OxyCODONE HCl (OXYCONTIN PO)   Yes Yes   Sig: Take 20 mg by mouth every 8 (eight) hours Pt takes 0600, 1400 and 2200    TiZANidine (ZANAFLEX) 4 MG capsule   Yes Yes   Sig: Take 4 mg by mouth 3 (three) times a day   b complex vitamins tablet   Yes Yes   Sig: Take 1 tablet by mouth daily  baclofen 20 mg tablet   Yes Yes   Sig: Take 20 mg by mouth 3 (three) times a day     celecoxib (CeleBREX) 200 mg capsule   Yes Yes   Sig: Take 200 mg by mouth   citalopram (CeleXA) 20 mg tablet   Yes Yes   Sig: Take 20 mg by mouth daily  gabapentin (NEURONTIN) 300 mg capsule   Yes Yes   Sig: Take 300 mg by mouth daily  levETIRAcetam (KEPPRA) 1000 MG tablet   Yes Yes   Sig: Take 750 mg by mouth 2 (two) times a day        Facility-Administered Medications: None       Past Medical History:   Diagnosis Date    [facility]NA[facility]DA [facility] kidney injury) ([facility] ) 1/23/2016    Arthritis     lower back    Depression     Multiple sclerosis ([place] [address] )     Psychiatric disorder     depression    Seizures ([place] [address] )     pseudoseizures       History reviewed  No pertinent surgical history  Family History   Problem Relation Age of Onset    Heart disease Father      I have reviewed and agree with the history as documented  Social History   Substance Use Topics    Smoking status: Current Every Day Smoker     Packs/day: 1 00     Types: E-Cigarettes     Last attempt to quit: 1/20/2012    Smokeless tobacco: Never Used      Comment: Pt states he quit smoking by using electronic cigarettes  Now does not smoke anything    Alcohol use No        Review of Systems   Constitutional: Positive for fatigue and fever  Negative for appetite change and chills  HENT: Negative for sneezing and sore throat  Eyes: Negative for visual disturbance  Respiratory: Negative for cough, choking, chest tightness, shortness of breath and wheezing  Cardiovascular: Negative for chest pain and palpitations  Gastrointestinal: Negative for abdominal pain, constipation, diarrhea, nausea and vomiting  Genitourinary: Negative for difficulty urinating and dysuria  Musculoskeletal: Positive for back pain  Neurological: Negative for dizziness, weakness, light-headedness, numbness and headaches     All other systems reviewed and are negative  Physical Exam  ED Triage Vitals [10/20/17 1822]   Temperature Pulse Respirations Blood Pressure SpO2   99 9 °F (37 7 °C) (!) 118 20 133/82 93 %      Temp Source Heart Rate Source Patient Position - Orthostatic VS BP Location FiO2 (%)   Oral Monitor Sitting Right arm --      Pain Score       5           Physical Exam   Constitutional: He is oriented to person, place, and time  He appears well-developed and well-nourished  No distress  HENT:   Head: Normocephalic and atraumatic  Mouth/Throat: Oropharynx is clear and moist    Eyes: EOM are normal  Pupils are equal, round, and reactive to light  Neck: No JVD present  No tracheal deviation present  Cardiovascular: Normal rate, regular rhythm, normal heart sounds and intact distal pulses  Exam reveals no gallop and no friction rub  No murmur heard  Pulmonary/Chest: Effort normal and breath sounds normal  No respiratory distress  He has no wheezes  He has no rales  Abdominal: Soft  Bowel sounds are normal  He exhibits no distension  There is no tenderness  There is no rebound and no guarding  Neurological: He is alert and oriented to person, place, and time  No cranial nerve deficit  He exhibits normal muscle tone  Skin: Skin is warm and dry  He is not diaphoretic  No pallor  Psychiatric: He has a normal mood and affect  His behavior is normal    Nursing note and vitals reviewed        ED Medications  Medications   oxyCODONE (ROXICODONE) immediate release tablet 10 mg (10 mg Oral Given 10/20/17 1932)   acetaminophen (TYLENOL) tablet 650 mg (650 mg Oral Given 10/20/17 1932)   sodium chloride 0 9 % bolus 1,000 mL (0 mL Intravenous Stopped 10/20/17 2152)   gadobutrol injection (MULTI-DOSE) SOLN 6 mL (6 mL Intravenous Given 10/20/17 2133)   cefepime (MAXIPIME) 2 g/50 mL dextrose IVPB (0 mg Intravenous Stopped 10/20/17 2334)       Diagnostic Studies  Labs Reviewed   CBC AND DIFFERENTIAL - Abnormal        Result Value Ref Range Status    WBC 12 09 (*) 4 31 - 10 16 Thousand/uL Final    RBC 3 97  3 88 - 5 62 Million/uL Final    Hemoglobin 12 5  12 0 - 17 0 g/dL Final    Hematocrit 37 4  36 5 - 49 3 % Final    MCV 94  82 - 98 fL Final    MCH 31 5  26 8 - 34 3 pg Final    MCHC 33 4  31 4 - 37 4 g/dL Final    RDW    11 6 - 15 1 % Final    Comment: This is a corrected result  Previous result was 12 4 % on 10/20/2017 at 1945 EDT    MPV    8 9 - 12 7 fL Final    Comment: This is a corrected result  Previous result was 10 4 fL on 10/20/2017 at 1945 EDT    Platelets 549  441 - 390 Thousands/uL Final    nRBC    /100 WBCs Final    Comment: This is an appended report  These results have been appended to a previously preliminary verified report  This is a corrected result  Previous result was 0 /100 WBCs on 10/20/2017 at 1945 EDT    Narrative: This is an appended report  These results have been appended to a previously verified report  COMPREHENSIVE METABOLIC PANEL - Abnormal     Total Protein 8 9 (*) 6 4 - 8 2 g/dL Final    Sodium 138  136 - 145 mmol/L Final    Potassium 3 9  3 5 - 5 3 mmol/L Final    Chloride 100  100 - 108 mmol/L Final    CO2 31  21 - 32 mmol/L Final    Anion Gap 7  4 - 13 mmol/L Final    BUN 21  5 - 25 mg/dL Final    Creatinine 1 20  0 60 - 1 30 mg/dL Final    Comment: Standardized to IDMS reference method    Glucose 115  65 - 140 mg/dL Final    Comment:   If the patient is fasting, the ADA then defines impaired fasting glucose as > 100 mg/dL and diabetes as > or equal to 123 mg/dL  Specimen collection should occur prior to Sulfasalazine administration due to the potential for falsely depressed results  Specimen collection should occur prior to Sulfapyridine administration due to the potential for falsely elevated results      Calcium 9 9  8 3 - 10 1 mg/dL Final    AST 30  5 - 45 U/L Final    Comment:   Specimen collection should occur prior to Sulfasalazine administration due to the potential for falsely depressed results  ALT 32  12 - 78 U/L Final    Comment:   Specimen collection should occur prior to Sulfasalazine administration due to the potential for falsely depressed results  Alkaline Phosphatase 88  46 - 116 U/L Final    Albumin 4 1  3 5 - 5 0 g/dL Final    Total Bilirubin 0 28  0 20 - 1 00 mg/dL Final    eGFR 64  ml/min/1 73sq m Final    Narrative:     National Kidney Disease Education Program recommendations are as follows:  GFR calculation is accurate only with a steady state creatinine  Chronic Kidney disease less than 60 ml/min/1 73 sq  meters  Kidney failure less than 15 ml/min/1 73 sq  meters     URINE MICROSCOPIC - Abnormal     WBC, UA 0-1 (*) None Seen, 0-5, 5-55, 5-65 /hpf Final    RBC, UA None Seen  None Seen, 0-5 /hpf Final    Epithelial Cells Occasional  None Seen, Occasional /hpf Final    Bacteria, UA None Seen  None Seen, Occasional /hpf Final   POCT URINALYSIS DIPSTICK - Abnormal    POCT CHEM 8+ - Abnormal     Anion Gap, Istat 15 (*) 4 - 13 mmol/L Final    SODIUM, I-STAT 140  136 - 145 mmol/l Final    Potassium, i-STAT 4 0  3 5 - 5 3 mmol/L Final    Chloride, istat 100  100 - 108 mmol/L Final    CO2, i-STAT 30  21 - 32 mmol/L Final    Calcium, Ionized i-STAT 1 19  1 12 - 1 32 mmol/L Final    BUN, I-STAT 22  5 - 25 mg/dl Final    Creatinine, i-STAT 1 2  0 6 - 1 3 mg/dl Final    eGFR 64  ml/min/1 73sq m Final    Glucose, i-STAT 109  65 - 140 mg/dl Final    Hct, i-STAT 40  36 5 - 49 3 % Final    Hgb, i-STAT 13 6  12 0 - 17 0 g/dl Final    Specimen Type VENOUS   Final   ED URINE MACROSCOPIC - Abnormal     Protein, UA 30 (1+) (*) Negative mg/dl Final    Ketones, UA 40 (2+) (*) Negative mg/dl Final    Color, UA Yellow   Final    Clarity, UA Clear   Final    pH, UA 7 0  4 5 - 8 0 Final    Leukocytes, UA Negative  Negative Final    Nitrite, UA Negative  Negative Final    Glucose, UA Negative  Negative mg/dl Final    Urobilinogen, UA 0 2  0 2, 1 0 E U /dl E U /dl Final    Bilirubin, UA Negative Negative Final    Blood, UA Negative  Negative Final    Specific Gravity, UA 1 020  1 003 - 1 030 Final    Narrative:     CLINITEK RESULT   MANUAL DIFFERENTIAL(PHLEBS DO NOT ORDER) - Abnormal     Segmented % 85 (*) 43 - 75 % Final    Lymphocytes % 5 (*) 14 - 44 % Final    Absolute Neutrophils 10 76 (*) 1 85 - 7 62 Thousand/uL Final    Bands % 4  0 - 8 % Final    Monocytes % 5  4 - 12 % Final    Eosinophils % 1  0 - 6 % Final    Basophils % 0  0 - 1 % Final    Lymphocytes Absolute 0 60  0 60 - 4 47 Thousand/uL Final    Monocytes Absolute 0 60  0 00 - 1 22 Thousand/uL Final    Eosinophils Absolute 0 12  0 00 - 0 40 Thousand/uL Final    Basophils Absolute 0 00  0 00 - 0 10 Thousand/uL Final    Total Counted 100   Final    RBC Morphology Normal   Final    Platelet Estimate Adequate  Adequate Final   LACTIC ACID, PLASMA - Normal    LACTIC ACID 1 1  0 5 - 2 0 mmol/L Final    Narrative:     Result may be elevated if tourniquet was used during collection  PROTIME-INR - Normal    Protime 12 4  12 1 - 14 4 seconds Final    INR 0 92  0 86 - 1 16 Final   APTT - Normal    PTT 29  23 - 35 seconds Final    Narrative: Therapeutic Heparin Range = 60-90 seconds   BLOOD CULTURE   BLOOD CULTURE       XR chest 2 views   ED Interpretation   No active pulmonary disease       MRI thoracic spine w wo contrast    (Results Pending)   MRI lumbar spine w wo contrast    (Results Pending)       Procedures  Procedures      Phone Consults  ED Phone Contact    ED Course  ED Course as of Oct 21 0041   Fri Oct 20, 2017   9190 Have the opportunity to discuss the case with our on-call neurologist to healthy clear by the side effects of the medications that he is on  We did clarify that he has an immune compromised state secondary to the Rebif  Neither these medications are known to cause fevers  We diff does particularly but people at increased risk of urinary tract infections    She agreed with empiric antibiotics and at least observation admission at this point  I have a chance to review the MRI of the T and L-spine  She feels that these are normal findings in a patient with a history of MS and they do not represent an active flare                                MDM  Number of Diagnoses or Management Options  Fever:   Immunocompromised state [facility]):   Diagnosis management comments: 13-year-old male with fever and immune compromised state secondary to MS drugs  She will check for sources of infection including blood cultures chest x-ray urinalysis, given the fever and back pain, there is concern for diskitis, epidural abscess, will petition Radiology for urgent MRI  May start empiric antibiotics, will give fluids, Tylenol, likely admit  CritCare Time    Disposition  Final diagnoses:   Fever   Immunocompromised state [facility])     ED Disposition     ED Disposition Condition Comment    Admit  Case was discussed with ROSANA and the patient's admission status was agreed to be Admission Status: inpatient status to the service of Dr Figueroa Cost   Follow-up Information    None       Current Discharge Medication List      CONTINUE these medications which have NOT CHANGED    Details   b complex vitamins tablet Take 1 tablet by mouth daily  baclofen 20 mg tablet Take 20 mg by mouth 3 (three) times a day        Calcium Citrate-Vitamin D (CALCIUM + D PO) Take by mouth      celecoxib (CeleBREX) 200 mg capsule Take 200 mg by mouth      citalopram (CeleXA) 20 mg tablet Take 20 mg by mouth daily  CLONAZEPAM PO Take 0 5 mg by mouth daily        !! Dalfampridine (AMPYRA PO) Take 10 mg by mouth 2 (two) times a day  !! Dalfampridine ER (AMPYRA) 10 MG TB12 Take by mouth      gabapentin (NEURONTIN) 300 mg capsule Take 300 mg by mouth daily        HYDROcodone-acetaminophen (NORCO) 5-325 mg per tablet Take 1 tablet by mouth every 6 (six) hours as needed for pain      Interferon Beta-1a (REBIF REBIDOSE) 44 MCG/0 5ML SOAJ Inject under the skin 3 (three) times a week      Lactobacillus (ACIDOPHILUS PROBIOTIC) TABS Take by mouth      levETIRAcetam (KEPPRA) 1000 MG tablet Take 750 mg by mouth 2 (two) times a day        Multiple Vitamin (MULTIVITAMIN) tablet Take 1 tablet by mouth daily      OxyCODONE HCl (OXYCONTIN PO) Take 20 mg by mouth every 8 (eight) hours Pt takes 0600, 1400 and 2200       TiZANidine (ZANAFLEX) 4 MG capsule Take 4 mg by mouth 3 (three) times a day       !! - Potential duplicate medications found  Please discuss with provider  No discharge procedures on file  ED Provider  Attending physically available and evaluated Jessica Pineda  LAURYN managed the patient along with the ED Attending      Electronically Signed by       Marrian Leventhal, MD  Resident  10/21/17 6094

## 2017-10-21 NOTE — CONSULTS
Consultation - Infectious Disease   Nhi Johnsonadan 57 y o  male MRN: [id number]  Unit/Bed#: [place] [address] -01 Encounter: [id number]      IMPRESSION & RECOMMENDATIONS:   Impression/Recommendations: This is a 57 y o  male, with underlying MS, came to the ER due to generalized weakness and was found to be febrile with mild leukocytosis, without obvious source of active infection  His clinically and systemically well, without sepsis or systemic toxicity  Patient was admitted and started on IV cefepime  1   Fever  There is no obvious source of active infection clinically  Patient is systemically well, without sepsis or systemic toxicity  Patient has had recurrent intermittent fever for the last 2 years, with negative ID workup  With patient's advanced MS, consider autonomic reason for fever  Regardless, with patient clinically and systemically well without obvious source of active infection, especially given history of significant C difficile colitis with systemic antibiotic, I would hold off on any empiric antibiotic  Observe off antibiotic  Discontinue cefepime  Monitor temperature/WBC  Follow-up on pending blood cultures  Consider total body indium scan  2   Mild leukocytosis  This is nonspecific  Without obvious active infection, as in above, I would monitor WBC off antibiotic  Monitor WBC off antibiotic  3   Mild urinary retension  Although patient does have risk for UTI due to his urinary tension, he has no urinary symptoms and his UA is completely normal  I doubt the patient has UTI clinically  Monitor for development on of any urinary symptoms  4   Chronic mid and low back pain  MRI of T-spine and L-spine without diskitis/vertebral osteomyelitis  Monitor back pain  5   History C difficile colitis secondary to systemic antibiotic, not responding to Flagyl but responded to vancomycin  Plan for discontinuation of systemic antibiotic as in above       Monitor for development of diarrhea  6   Advanced MS  Consider autonomic nerve involvement with MS as etiology of recurrent fever  Previous records reviewed in detail  Discussed with the patient in detail regarding the above plan  Thank you for this consultation  We will follow along with you  HISTORY OF PRESENT ILLNESS:  Reason for Consult:  Fever  HPI: Trudy Patel is a 57 y o  male, with underlying MS, came to the ER yesterday due to weakness at home  On presentation to the ER, he had fever and mild leukocytosis  Patient had no focal symptoms other than chronic mid and low back pain  Patient was admitted and started on cefepime  We asked to evaluate the patient  This morning, patient states that he feels a little better again  Weakness is improved  He denies any chills  Of note, patient came to the ER here in June of this year with similar complaint of weakness and was found to be febrile without focal symptoms  Blood cultures were negative then  Also, of note, patient has mild urine retention, not requiring catheterization yet  He denies any urinary symptoms  His UA on presentation yesterday was completely normal     Back in June of 2016, patient was also admitted with fever of unclear source  He was started on antibiotic for presumptive pneumonia, although he had no respiratory symptoms and CXR was equivocal, at best   Patient's fever resolved but he developed C difficile colitis, requiring treatment with vancomycin when he failed Flagyl  REVIEW OF SYSTEMS:  A complete 12 point system-based review of systems is otherwise negative  PAST MEDICAL HISTORY:  Past Medical History:   Diagnosis Date    [facility] ) 1/23/2016    Arthritis     lower back    Depression     [facility]     Psychiatric disorder     depression    Seizures ([facility])     pseudoseizures     History reviewed  No pertinent surgical history  Problem list reviewed      FAMILY HISTORY:  Non-contributory    SOCIAL HISTORY:  History   Alcohol Use No     History   Drug Use No     History   Smoking Status    Current Every Day Smoker    Packs/day: 1 00    Types: E-Cigarettes    Last attempt to quit: 2012   Smokeless Tobacco    Never Used     Comment: Pt states he quit smoking by using electronic cigarettes  Now does not smoke anything       ALLERGIES:  Allergies   Allergen Reactions    Fentanyl Other (See Comments)     Shakes/tremors    Tea Tree Oil     Ultram [Tramadol] Other (See Comments)     Shakes, tremors       MEDICATIONS:  All current active medications have been reviewed  Patient is currently on cefepime  PHYSICAL EXAM:  Vitals:  HR:  [] 93  Resp:  [17-20] 18  BP: (100-133)/(43-82) 100/43  SpO2:  [90 %-98 %] 98 %  Temp (24hrs), Av °F (37 2 °C), Min:96 3 °F (35 7 °C), Max:101 8 °F (38 8 °C)  Current: Temperature: (!) 96 3 °F (35 7 °C)     Physical Exam:  General:  Chronically ill appearing, comfortable, in no acute distress  Awake, alert and oriented x 3  Eyes:  Conjunctive clear with no hemorrhages or effusions  Oropharynx:  No ulcers, no lesions, pharynx benign, no tonsillitis  Neck:  Supple, no lymphadenopathy, no mass, nontender  Lungs:  Expansion symmetric, no rales, no wheezing, no accessory muscle use  Cardiac:  Mildly tachycardic with regular rhythm, normal S1, normal S2, no murmurs  Abdomen:  Soft, nondistended, non-tender, no HSM  Back:  Mild diffuse tenderness  No deformity  No ulcer  No erythema/warmth  Extremities:  No edema, no erythema, nontender  No ulcers  Skin:  No rashes, no ulcers  Neurological:  Mild generalized weakness, but moves all four extremities spontaneously, sensation grossly intact    LABS, IMAGING, & OTHER STUDIES:  Lab Results:  I have personally reviewed pertinent labs      Results from last 7 days  Lab Units 10/21/17  0457 10/20/17  1946 10/20/17  1919   SODIUM mmol/L 137  --  138   POTASSIUM mmol/L 4 4  --  3 9 CHLORIDE mmol/L 103  --  100   CO2 mmol/L 31  --  31   ANION GAP mmol/L 3*  --  7   BUN mg/dL 23  --  21   CREATININE mg/dL 1 36*  --  1 20   EGFR ml/min/1 73sq m 55 64 64   GLUCOSE RANDOM mg/dL 85  --  115   GLUCOSE, ISTAT mg/dl  --  109  --    CALCIUM mg/dL 8 9  --  9 9   AST U/L  --   --  30   ALT U/L  --   --  32   ALK PHOS U/L  --   --  88   TOTAL PROTEIN g/dL  --   --  8 9*   ALBUMIN g/dL  --   --  4 1   BILIRUBIN TOTAL mg/dL  --   --  0 28       Results from last 7 days  Lab Units 10/21/17  0457 10/20/17  1946 10/20/17  1919   WBC Thousand/uL 9 73  --  12 09*   HEMOGLOBIN g/dL 10 7*  --  12 5   I STAT HEMOGLOBIN g/dl  --  13 6  --    PLATELETS Thousands/uL 225  --  269           Imaging Studies:   I have personally reviewed pertinent imaging study reports and images in PACS  CXR reviewed personally  No infiltrates or consolidations  T-spine and L-spine MRI reviewed personally  No diskitis/vertebral osteomyelitis/abscess  EKG, Pathology, and Other Studies:   I have personally reviewed pertinent reports

## 2017-10-21 NOTE — ED ATTENDING ATTESTATION
Kermit Alcala DO, saw and evaluated the patient  I have discussed the patient with the resident/non-physician practitioner and agree with the resident's/non-physician practitioner's findings, Plan of Care, and MDM as documented in the resident's/non-physician practitioner's note, except where noted  All available labs and Radiology studies were reviewed  At this point I agree with the current assessment done in the Emergency Department  I have conducted an independent evaluation of this patient a history and physical is as follows:      Critical Care Time  CritCare Time  77-year-old male with a history of MS presents to the emergency department with fever tonight of 101 8  Patient has no other symptoms  No chills, dysuria, nausea, vomiting, coughing  He does have back pain which is chronic in the thoracic toe lumbar region, however he states this is worse than usual   He states that he has a history of fever of unknown origin and had been worked up in the past   On exam he is awake and alert and nontoxic appearing  Pharynx is normal  TMs are normal  Neck is supple  Heart is regular without murmur  Lungs are clear  No discrete thoracic or lumbar tenderness  Abdomen is soft and nontender  Neurologically has no new focal deficits  Skin is warm and dry, normal color no rash

## 2017-10-21 NOTE — PROGRESS NOTES
Oxycontin 12h tablet ordered q8h  Confirmed with patient he takes this twice a day  Call placed to admitting PA Eldora Schaumann re: oxycontin order   System downtime during call and stated he will change once system comes up

## 2017-10-21 NOTE — H&P
History and Physical - [facility] Internal Medicine    Patient Information: Yuli Ferreira 57 y o  male MRN: [id number]  [facility] 2 [facility] 87 [id number] Encounter: [id number]  Admitting Physician: Bobby Beck PA-C  PCP: Julian García DO  Date of Admission:  10/21/17      Assessment:    Fever, unknown origin    Plan:    Fever, unknown origin  · On admission 101 8 at 18:35 and 99 2 at 23:00 and elevated WBC-12 09  · Cultures of blood and urine pending  · Will empirically treat with cefepime 1 g IV q 8 hours  · Will consult Neurology    Multiple sclerosis, progressive  · Will continue on patient's home medications    History of C Diff  infection  · Precipitated by dual antibiotic treatment during a prior hospitalization and we will monitor closely for this    Depression  · Continue Celexa    History of seizure  · Continue Keppra    Chronic pain  · Patient is on regimen of Neurontin, OxyContin and Norco we will continue these    HPI:   Guerda Klein is a 57 y o  male with a history of secondary progressive multiple sclerosis who was at home today and felt significantly weak and presented to the emergency department via ambulance with a fever of 101 8  He was unable to go from a sitting to standing position  He normally ambulates with a rolling walker  He was essentially stuck in his chair at home  He had no nausea, vomiting or diarrhea and no dysuria  He does have some mild constipation  He does have some chronic urinary urgency but no burning and no pain recently  He has had prior admissions for FUO  Denies: Chest pain, Shortness of Breath, Nausea, Vomiting, Diarrhea, Dysuria, sick contacts    ROS:  A 12-point review of systems was done  Please see the HPI for the full details  All other systems negative      PMH:  Principal Problem:    Fever, unknown origin  Active Problems:    Multiple sclerosis (HCC)    Weakness    Dysphagia    Chronic back pain    Immunocompromised state [facility])      Past Medical History: Diagnosis Date    ANANDA (acute kidney injury) ([facility] [address] ) 1/23/2016    Arthritis     lower back    Depression     Multiple sclerosis (HCC)     Psychiatric disorder     depression    Seizures (HCC)     pseudoseizures     History reviewed  No pertinent surgical history  Social History     Social History    Marital status: /Civil Union     Spouse name: N/A    Number of children: N/A    Years of education: N/A     Social History Main Topics    Smoking status: Current Every Day Smoker     Packs/day: 1 00     Types: E-Cigarettes     Last attempt to quit: 1/20/2012    Smokeless tobacco: Never Used      Comment: Pt states he quit smoking by using electronic cigarettes  Now does not smoke anything    Alcohol use No    Drug use: No    Sexual activity: No     Other Topics Concern    None     Social History Narrative    None     Family History   Problem Relation Age of Onset    Heart disease Father        MED/ALLERGIES:  No current facility-administered medications for this encounter  Allergies   Allergen Reactions    Fentanyl Other (See Comments)     Shakes/tremors    Tea Tree Oil     Ultram [Tramadol] Other (See Comments)     Shakes, tremors       OBJECTIVE:    Current Vitals:   Blood Pressure: 128/68 (10/21/17 0016)  Pulse: 70 (10/21/17 0016)  Temperature: 97 7 °F (36 5 °C) (10/21/17 0016)  Temp Source: Tympanic (10/21/17 0016)  Respirations: 18 (10/21/17 0016)  Weight - Scale: 60 1 kg (132 lb 7 9 oz) (10/21/17 0016)  SpO2: 93 % (10/21/17 0016)      Intake/Output Summary (Last 24 hours) at 10/21/17 0026  Last data filed at 10/20/17 2152   Gross per 24 hour   Intake             1000 ml   Output                0 ml   Net             1000 ml       Invasive Devices     Peripheral Intravenous Line            Peripheral IV 10/20/17 Left Forearm less than 1 day    Peripheral IV 10/20/17 Right Antecubital less than 1 day                  Physical Exam   Constitutional: He appears well-developed   He appears cachectic  He is cooperative  HENT:   Head: Normocephalic and atraumatic  Right Ear: External ear and ear canal normal  Tympanic membrane is injected  Left Ear: External ear and ear canal normal  Tympanic membrane is injected  Nose: Nose normal    Mouth/Throat: Uvula is midline, oropharynx is clear and moist and mucous membranes are normal  Mucous membranes are not pale and not dry  He has dentures  Eyes: Lids are normal  Pupils are equal, round, and reactive to light  Right conjunctiva is not injected  Left conjunctiva is not injected  No scleral icterus  Neck: Trachea normal  No thyroid mass and no thyromegaly present  Cardiovascular: Normal rate, regular rhythm and normal heart sounds  No extrasystoles are present  Pulses:       Radial pulses are 2+ on the right side, and 2+ on the left side  Dorsalis pedis pulses are 2+ on the right side, and 2+ on the left side  Pulmonary/Chest: Effort normal and breath sounds normal    Abdominal: Soft  Bowel sounds are normal  There is no tenderness  Lymphadenopathy:     He has no cervical adenopathy  Neurological: He is alert  He is not disoriented  No cranial nerve deficit  Psychiatric: He has a normal mood and affect   His speech is normal and behavior is normal  Thought content normal                                                      Lab Results:   Results from last 7 days  Lab Units 10/20/17  1946 10/20/17  1919   WBC Thousand/uL  --  12 09*   HEMOGLOBIN g/dL  --  12 5   I STAT HEMOGLOBIN g/dl 13 6  --    HEMATOCRIT %  --  37 4   PLATELETS Thousands/uL  --  269      Results from last 7 days  Lab Units 10/20/17  1946 10/20/17  1919   SODIUM mmol/L  --  138   POTASSIUM mmol/L  --  3 9   CHLORIDE mmol/L  --  100   CO2 mmol/L  --  31   BUN mg/dL  --  21   CREATININE mg/dL  --  1 20   CALCIUM mg/dL  --  9 9   TOTAL PROTEIN g/dL  --  8 9*   BILIRUBIN TOTAL mg/dL  --  0 28   ALK PHOS U/L  --  88   ALT U/L  --  32   AST U/L  --  30 GLUCOSE RANDOM mg/dL  --  115   GLUCOSE, ISTAT mg/dl 109  --      Lab Results   Component Value Date    CKTOTAL 204 11/09/2015    CKMBINDEX 1 1 11/09/2015    TROPONINI <0 02 11/01/2015         Imaging:  CXR:  No acute pulmonary disease appreciated    VTE Prophylaxis: Heparin    Code Status: DNR    Counseling / Coordination of Care: Total floor / unit time spent today 30 minutes  Anticipated Length of Stay will be: MORE THAN 2 (TWO) Midnights due to: Culture results and IV antibiotics    Millie Wallace PA-C    This note has been constructed using a voice recognition system

## 2017-10-21 NOTE — PROGRESS NOTES
On call Note - Neurology   Rodolfo Corona 57 y o  male MRN: [id number]  Unit/Bed#: ED 32 Encounter: [id number]      56 yo male with a history of MS  presented with fever and back pain   Mri of thoracic  spine   No evidence of a epidural hematoma  And chronic demyelination    I reviewed Rebif and ampyra   Both of these meds do not cause  abrupt , sudden  fevers   On Rebif for years   ampyra is associated with increase uti, increase risk of seizure and adjusted for renal impairment   it  is used for ambulation  He is immunocompromised    He was last seen in office in sept of 2017 and was doing well/ stable     D/aw Er team

## 2017-10-21 NOTE — CASE MANAGEMENT
Initial Clinical Review    Admission: Date/Time/Statement: 10/20/17 @ 2330     Orders Placed This Encounter   Procedures    Inpatient Admission (expected length of stay for this patient is greater than two midnights)     Standing Status:   Standing     Number of Occurrences:   1     Order Specific Question:   Admitting Physician     Answer:   Licha Altamirano     Order Specific Question:   Level of Care     Answer:   Med Surg [16]     Order Specific Question:   Estimated length of stay     Answer:   More than 2 Midnights     Order Specific Question:   Certification     Answer:   I certify that inpatient services are medically necessary for this patient for a duration of greater than two midnights  See H&P and MD Progress Notes for additional information about the patient's course of treatment  ED: Date/Time/Mode of Arrival:   ED Arrival Information     Expected Arrival Acuity Means of Arrival Escorted By Service Admission Type    - 10/20/2017 18:14 Urgent Ambulance [facility] General Medicine Urgent    Arrival Complaint    Pain          Chief Complaint:   Chief Complaint   Patient presents with    Back Pain     hx of chronic back pain states took his 20 mg oxycodone later then he normaly would and the pain became unbearable, per EMS pt had fever of 102 5 pre hospital         History of Illness:   Henri Oswald is a 57 y o  male with a history of secondary progressive multiple sclerosis who was at home today and felt significantly weak and presented to the emergency department via ambulance with a fever of 101 8  He was unable to go from a sitting to standing position  He normally ambulates with a rolling walker  He was essentially stuck in his chair at home  He had no nausea, vomiting or diarrhea and no dysuria  He does have some mild constipation  He does have some chronic urinary urgency but no burning and no pain recently  He has had prior admissions for FUO      ED Vital Signs:   ED Triage Vitals [10/20/17 1822]   Temperature Pulse Respirations Blood Pressure SpO2   99 9 °F (37 7 °C) (!) 118 20 133/82 93 %      Temp Source Heart Rate Source Patient Position - Orthostatic VS BP Location FiO2 (%)   Oral Monitor Sitting Right arm --      Pain Score       5        Wt Readings from Last 1 Encounters:   10/21/17 60 1 kg (132 lb 7 9 oz)       Vital Signs (abnormal):   above    Abnormal Labs/Diagnostic Test Results:    WBC   12 09  MRI  L/S  Spine:  Scattered multilevel spondylosis without evidence of discitis/osteomyelitis   No epidural abscess  2   Chronic compression abnormalities of T12, L1 as well as to a lesser degree L4 with a mild grade 1 anterolisthesis of L4 on L5 and a moderate dextroscoliosis thoracolumbar junction  MRI  T/spine:    No evidence of discitis/osteomyelitis or epidural abscess  2   Scattered mild spondylotic changes  3   Chronic demyelinating lesions stable  4   Incidental thyroid nodule(s) for which thyroid ultrasound is recommended    CXR:    [facility]    ED Treatment:   Medication Administration from 10/20/2017 1813 to 10/21/2017 0014       Date/Time Order Dose Route Action Action by Comments     10/20/2017 1948 sodium chloride (PF) 0 9 % injection 3 mL 3 mL Intravenous Given Dedra Washington RN      10/20/2017 1932 oxyCODONE (ROXICODONE) immediate release tablet 10 mg 10 mg Oral Given Dedra Washington RN      10/20/2017 1932 acetaminophen (TYLENOL) tablet 650 mg 650 mg Oral Given Dedra Washington RN      10/20/2017 2152 sodium chloride 0 9 % bolus 1,000 mL 0 mL Intravenous Stopped Dedra Washington RN      10/20/2017 1932 sodium chloride 0 9 % bolus 1,000 mL 1,000 mL Intravenous New Bag Dedra Washington RN      10/20/2017 2133 gadobutrol injection (MULTI-DOSE) SOLN 6 mL 6 mL Intravenous Given Jose Colmenares      10/20/2017 2334 cefepime (MAXIPIME) 2 g/50 mL dextrose IVPB 0 mg Intravenous Stopped aDr Katz RN      10/20/2017 2258 cefepime (MAXIPIME) 2 g/50 mL dextrose IVPB 2,000 mg Intravenous New Bag Bhupendra Trejo RN           Past Medical/Surgical History: Active Ambulatory Problems     Diagnosis Date Noted    Multiple sclerosis ([place] [address] )     Depression     Weakness 01/20/2016    Seizure ([place] [address] ) 01/20/2016    C  difficile colitis 01/23/2016    CAP (community acquired pneumonia) 01/23/2016    Dysphagia 01/23/2016    Chronic back pain 01/23/2016    ANANDA (acute kidney injury) ([place] [address] ) 01/23/2016     Resolved Ambulatory Problems     Diagnosis Date Noted    No Resolved Ambulatory Problems     Past Medical History:   Diagnosis Date    ANANDA (acute kidney injury) ([place] [address] ) 1/23/2016    Arthritis     Depression     Multiple sclerosis ([facility])     Psychiatric disorder     Seizures ([facility])        Admitting Diagnosis: Pain [R52]  Fever [R50 9]  Immunocompromised state ([place] [address] ) [D84 9]    Age/Sex: 57 y o  male    Assessment/Plan:   Fever, unknown origin     Plan:     Fever, unknown origin  · On admission 101 8 at 18:35 and 99 2 at 23:00 and elevated WBC-12 09  · Cultures of blood and urine pending  · Will empirically treat with cefepime 1 g IV q 8 hours  · Will consult Neurology     Multiple sclerosis, progressive  · Will continue on patient's home medications     History of C Diff   infection  · Precipitated by dual antibiotic treatment during a prior hospitalization and we will monitor closely for this     Depression  · Continue Celexa     History of seizure  · Continue Keppra     Chronic pain  Patient is on regimen of Neurontin, OxyContin and Norco we will continue these    Admission Orders:   IP   10/20  @    2330  Scheduled Meds:   baclofen 20 mg Oral HS   celecoxib 200 mg Oral Daily   [START ON 10/22/2017] citalopram 40 mg Oral Daily   clonazePAM 1 5 mg Oral HS   gabapentin 300 mg Oral HS   heparin (porcine) 5,000 Units Subcutaneous Q8H [facility]   Interferon Beta-1a  Subcutaneous Once per day on Mon Wed Fri   lactobacillus acidophilus-bulgaricus 1 packet Oral TID With Meals levETIRAcetam 750 mg Oral BID   multivitamin stress formula 1 tablet Oral Daily   NON FORMULARY 10 mg Oral Daily   NON FORMULARY  Oral Daily   oxyCODONE 20 mg Oral Q12H [facility]   tiZANidine 4 mg Oral TID     Continuous Infusions:    PRN Meds:   acetaminophen    HYDROcodone-acetaminophen    influenza vaccine     Cons neuro  Cons  ID  Ref  Diet  [place] thick liq    St  [facility] in the [facility] by [employer] for 2017  Network Utilization Review Department  Phone: [phone]; Fax [phone]  ATTENTION: The Network Utilization Review Department is now centralized for our 7 Facilities  Make a note that we have a new phone and fax numbers for our Department  Please call with any questions or concerns to [phone] and carefully follow the prompts so that you are directed to the right person  All voicemails are confidential  Fax any determinations, approvals, denials, and requests for initial or continue stay review clinical to [phone]  Due to HIGH CALL volume, it would be easier if you could please send faxed requests to expedite your requests and in part, help us provide discharge notifications faster

## 2017-10-22 LAB
BASOPHILS # BLD AUTO: 0.03 THOUSANDS/ΜL (ref 0–0.1)
BASOPHILS NFR BLD AUTO: 1 % (ref 0–1)
EOSINOPHIL # BLD AUTO: 0.06 THOUSAND/ΜL (ref 0–0.61)
EOSINOPHIL NFR BLD AUTO: 1 % (ref 0–6)
ERYTHROCYTE [DISTWIDTH] IN BLOOD BY AUTOMATED COUNT: 12.4 % (ref 11.6–15.1)
HCT VFR BLD AUTO: 31.2 % (ref 36.5–49.3)
HGB BLD-MCNC: 10.4 G/DL (ref 12–17)
LYMPHOCYTES # BLD AUTO: 1.72 THOUSANDS/ΜL (ref 0.6–4.47)
LYMPHOCYTES NFR BLD AUTO: 33 % (ref 14–44)
MCH RBC QN AUTO: 31.6 PG (ref 26.8–34.3)
MCHC RBC AUTO-ENTMCNC: 33.3 G/DL (ref 31.4–37.4)
MCV RBC AUTO: 95 FL (ref 82–98)
MONOCYTES # BLD AUTO: 0.61 THOUSAND/ΜL (ref 0.17–1.22)
MONOCYTES NFR BLD AUTO: 12 % (ref 4–12)
NEUTROPHILS # BLD AUTO: 2.85 THOUSANDS/ΜL (ref 1.85–7.62)
NEUTS SEG NFR BLD AUTO: 53 % (ref 43–75)
NRBC BLD AUTO-RTO: 0 /100 WBCS
PLATELET # BLD AUTO: 189 THOUSANDS/UL (ref 149–390)
PMV BLD AUTO: 9.9 FL (ref 8.9–12.7)
RBC # BLD AUTO: 3.29 MILLION/UL (ref 3.88–5.62)
WBC # BLD AUTO: 5.27 THOUSAND/UL (ref 4.31–10.16)

## 2017-10-22 PROCEDURE — 85025 COMPLETE CBC W/AUTO DIFF WBC: CPT | Performed by: HOSPITALIST

## 2017-10-22 RX ADMIN — HEPARIN SODIUM 5000 UNITS: 5000 INJECTION, SOLUTION INTRAVENOUS; SUBCUTANEOUS at 13:11

## 2017-10-22 RX ADMIN — TIZANIDINE 4 MG: 4 TABLET ORAL at 21:15

## 2017-10-22 RX ADMIN — OXYCODONE HYDROCHLORIDE 20 MG: 20 TABLET, FILM COATED, EXTENDED RELEASE ORAL at 21:15

## 2017-10-22 RX ADMIN — TIZANIDINE 4 MG: 4 TABLET ORAL at 16:37

## 2017-10-22 RX ADMIN — BACLOFEN 20 MG: 10 TABLET ORAL at 21:15

## 2017-10-22 RX ADMIN — LEVETIRACETAM 750 MG: 750 TABLET ORAL at 17:14

## 2017-10-22 RX ADMIN — ZINC 1 TABLET: TAB ORAL at 08:43

## 2017-10-22 RX ADMIN — OXYCODONE HYDROCHLORIDE 20 MG: 20 TABLET, FILM COATED, EXTENDED RELEASE ORAL at 08:43

## 2017-10-22 RX ADMIN — HEPARIN SODIUM 5000 UNITS: 5000 INJECTION, SOLUTION INTRAVENOUS; SUBCUTANEOUS at 05:15

## 2017-10-22 RX ADMIN — HEPARIN SODIUM 5000 UNITS: 5000 INJECTION, SOLUTION INTRAVENOUS; SUBCUTANEOUS at 21:12

## 2017-10-22 RX ADMIN — GABAPENTIN 300 MG: 300 CAPSULE ORAL at 21:15

## 2017-10-22 RX ADMIN — LEVETIRACETAM 750 MG: 750 TABLET ORAL at 08:43

## 2017-10-22 RX ADMIN — CITALOPRAM HYDROBROMIDE 40 MG: 20 TABLET ORAL at 08:43

## 2017-10-22 RX ADMIN — CELECOXIB 200 MG: 200 CAPSULE ORAL at 08:44

## 2017-10-22 RX ADMIN — CLONAZEPAM 1.5 MG: 1 TABLET ORAL at 21:14

## 2017-10-22 NOTE — PROGRESS NOTES
Progress Note - Luis Habermann 57 y o  male MRN: [id number]    Unit/Bed#: [place] [address] -01 Encounter: [id number]      Assessment/Plan:  1-Fever-without any source   No evidence of UTI, pneumonia, sepsis or any skin infection  Patient is systemically well and has been of febrile since admission  On admission 101 8 at 18:35 and 99 2 at 23:00 and elevated WBC-12 09  Patient was started on cefepime in the ED which has been discontinued since no source of fever has been found and the patient has had a history of Clostridium difficile infection  Patient remains afebrile since admission  Sh[place]ha [place] a non infective cause of fever most likely autonomic dysfunction causing dysregulation of his temperature regulating thermostat secondary to multiple sclerosis  Blood cultures sent which is negative at 24 hours  If negative at 48 hours could be discharged home      2-Multiple sclerosis, progressive-Will continue on patient's home medications     3-History of C Diff  infection-Precipitated by dual antibiotic treatment during a prior hospitalization  Patient was started on IV antibiotics on admission which has been discontinued  Will monitor closely for any diarrhea  4- Depression-mood stable Continue Celexa     5-History of seizure-stable without evidence of any recurrence  Continue Keppra     6-Chronic pain with continuous opioid dependence secondary to 2-Patient is on regimen of Neurontin, OxyContin and Norco we will continue these    7-protein calorie malnutrition secondary to 2    Anticipate discharge home 10/23/2017 a blood cultures negative at 48 hours -and patient remains afebrile    Subjective:  No fever since admission  Blood cultures negative at 24 hours  Antibiotics have been discontinued since no source of fever found  Patient likely has fluctuating temperatures secondary to dysregulation of his temperature regulating thermostat likely secondary to multiple sclerosis      Physical Exam:   Vitals: Blood pressure 113/56, pulse 61, temperature (!) 95 9 °F (35 5 °C), temperature source Tympanic, resp  rate 18, height 5' 6" (1 676 m), weight 60 1 kg (132 lb 7 9 oz), SpO2 96 %  ,Body mass index is 21 39 kg/m²  Gen:  Pleasant, non-tachypnic, non-dyspnic  Conversant  Heart: regular rate and rhythm, S1S2 present, no murmur, rub or gallop  Lungs: clear to ausculatation bilaterally  No wheezing, crackless, or rhonchi  No accessory muscle use or respiratory distress  Abd: soft, non-tender, non-distended  NABS, no guarding, rebound or peritoneal signs  Extremities: no clubbing, cyanosis or edema  2+pedal pulses bilaterally  Full range of motion  Neuro: awake, alert and oriented  Cranial nerves 2-12 intact  Mild generalized weakness but can move all 4 extremities spontaneously  Skin: warm and dry: no petechiae, purpura and rash  LABS:     Results from last 7 days  Lab Units 10/22/17  0433 10/21/17  0457 10/20/17  1946 10/20/17  1919   WBC Thousand/uL 5 27 9 73  --  12 09*   HEMOGLOBIN g/dL 10 4* 10 7*  --  12 5   I STAT HEMOGLOBIN g/dl  --   --  13 6  --    HEMATOCRIT % 31 2* 32 2*  --  37 4   PLATELETS Thousands/uL 189 225  --  269       Results from last 7 days  Lab Units 10/21/17  0457  10/20/17  1919   SODIUM mmol/L 137  --  138   POTASSIUM mmol/L 4 4  --  3 9   CHLORIDE mmol/L 103  --  100   CO2 mmol/L 31  --  31   BUN mg/dL 23  --  21   CREATININE mg/dL 1 36*  --  1 20   GLUCOSE RANDOM mg/dL 85  --  115   GLUCOSE, ISTAT   --   < >  --    CALCIUM mg/dL 8 9  --  9 9   < > = values in this interval not displayed      Intake/Output Summary (Last 24 hours) at 10/22/17 0918  Last data filed at 10/21/17 2102   Gross per 24 hour   Intake                0 ml   Output              550 ml   Net             -550 ml           baclofen 20 mg Oral HS   celecoxib 200 mg Oral Daily   citalopram 40 mg Oral Daily   clonazePAM 1 5 mg Oral HS   gabapentin 300 mg Oral HS   heparin (porcine) 5,000 Units Subcutaneous Q8H [address]   Interferon Beta-1a  Subcutaneous Once per day on Mon Wed Fri   lactobacillus acidophilus-bulgaricus 1 packet Oral TID With Meals   levETIRAcetam 750 mg Oral BID   multivitamin stress formula 1 tablet Oral Daily   NON FORMULARY 10 mg Oral Daily   NON FORMULARY  Oral Daily   oxyCODONE 20 mg Oral Q12H [address]   tiZANidine 4 mg Oral TID       Family update- updated wife on 10/21/2017

## 2017-10-22 NOTE — CONSULTS
Consultation - Neurology   Nhi Irwin 57 y o  male MRN: [id number]  Unit/Bed#: [place] 2 -01 Encounter: [id number]      Assessment/Plan   Assessment:  1  Fever, increased back pain and weakness  All symptoms seem to have resolved  No obvious infectious source  Unlikely to be related directly to MS (as from hypothalamic mediated temperature dysregulation), but could represent flu like reaction from his Rebif injection  Other possibilities would be nonspecific viral infection  In retrospect, his back pain may have been exacerbated simply because he missed the a m  OxyContin dose  2  History relapsing remitting MS  No evidence of acute exacerbation  3   History chronic pain  4  History of seizures (on clarified if definitively epileptic versus nonepileptic events)  No recent recurrence  5  Ambulatory dysfunction secondary to multiple sclerosis  Plan:  1  We will request MRI brain and C-spine, to help assess ongoing therapeutic decisions with respect to disease modifying therapy  2  No need for steroids  3  No need for LP  4  Physical therapy for gait retraining  5  Once he is ambulating at baseline, should be okay to discharge  6  Continue Rebif, Ampyra, and symptomatic meds as prescribed  7  Continue Keppra 750 b i d  draw Keppra level as was requested outpatient    Physician Requesting Consult: Alexis Orantes MD  Reason for Consult / Principal Problem:  MS, fever    HPI: Nhi Irwin is a 57y o  year old male with history of multiple sclerosis who presents with increased generalized weakness, particularly lower extremity weakness right more than left, with inability to ambulate, in context of new fever and worsened chronic back pain  MRI imaging of his thoracic and lumbar spine have been completed and show no evidence of epidural abscess diskitis, or osteomyelitis  Vlad says his fever developed Friday evening, and he was ambulating at baseline earlier in the day    He now believes that he may have omitted or missed his a m  dose of OxyContin leading to increased back pain  Since he has been admitted, he was taken off antibiotics and there has not been any recurrent fever  Infectious Disease is consulted and unable to locate any definitive source  He has had at least 2 other occasions of transient fever without obvious etiology  He has had MS for nearly 25 years, is currently being maintained on Rebif injections Monday Wednesday Friday, along with Ampyra for gait dysfunction, and several symptomatic meds  He also carries 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, maintained on Keppra  He he states in comparison to when he came in, he has now much better able to move his lower extremities, and since being back on his OxyContin/Celebrex regimen, his back pain is all but resolved (rated 1/10)  He denies any more systemic signs such as URI symptoms, dysuria, abdominal pain or nausea  No headache       Inpatient consult to Neurology  Consult performed by: Jackson Hardwick ordered by: Latosha Monae          Review of Systems   Constitutional: Positive for activity change, chills, fatigue and fever  HENT: Negative for congestion, ear discharge, ear pain, hearing loss, sinus pain, sinus pressure and trouble swallowing  Eyes:        Reports occasional transient diplopia, chronically   Respiratory: Negative for cough and shortness of breath  Cardiovascular: Negative for chest pain and leg swelling  Gastrointestinal: Negative for abdominal pain, diarrhea, nausea and vomiting  Genitourinary:         reports occasional urinary retention, but no need for self catheterization   Musculoskeletal: Positive for back pain and gait problem  Negative for neck pain  Skin: Negative  Allergic/Immunologic: Negative  Neurological: Positive for tremors, speech difficulty and weakness  Negative for dizziness, seizures, numbness and headaches  Hematological: Negative  Psychiatric/Behavioral: Positive for confusion  Historical Information   Past Medical History:   Diagnosis Date    ANANDA (acute kidney injury) ([facility] [address] ) 1/23/2016    Arthritis     lower back    Depression     Multiple sclerosis (HCC)     Psychiatric disorder     depression    Seizures (HCC)     pseudoseizures     History reviewed  No pertinent surgical history  Social History   History   Alcohol Use No     History   Drug Use No     History   Smoking Status    Current Every Day Smoker    Packs/day: 1 00    Types: E-Cigarettes    Last attempt to quit: 1/20/2012   Smokeless Tobacco    Never Used     Comment: Pt states he quit smoking by using electronic cigarettes  Now does not smoke anything     Family History: non-contributory    Review of previous medical records was  completed       Meds/Allergies   all current active meds have been reviewed, current meds:   Current Facility-Administered Medications   Medication Dose Route Frequency    acetaminophen (TYLENOL) tablet 650 mg  650 mg Oral Q6H PRN    baclofen tablet 20 mg  20 mg Oral HS    celecoxib (CeleBREX) capsule 200 mg  200 mg Oral Daily    citalopram (CeleXA) tablet 40 mg  40 mg Oral Daily    clonazePAM (KlonoPIN) tablet 1 5 mg  1 5 mg Oral HS    gabapentin (NEURONTIN) capsule 300 mg  300 mg Oral HS    heparin (porcine) subcutaneous injection 5,000 Units  5,000 Units Subcutaneous Q8H [address]    HYDROcodone-acetaminophen (NORCO) 5-325 mg per tablet 1 tablet  1 tablet Oral Q6H PRN    influenza inactivated quadrivalent vaccine (FLULAVAL) IM injection 0 5 mL  0 5 mL Intramuscular Prior to discharge    Interferon Beta-1a SOAJ   Subcutaneous Once per day on Mon Wed Fri    lactobacillus acidophilus-bulgaricus [facility]) packet 1 packet  1 packet Oral TID With Meals    levETIRAcetam (KEPPRA) tablet 750 mg  750 mg Oral BID    multivitamin stress formula tablet 1 tablet  1 tablet Oral Daily    NON FORMULARY  10 mg Oral Daily    NON FORMULARY Oral Daily    oxyCODONE (OxyCONTIN) 12 hr tablet 20 mg  20 mg Oral Q12H [facility]    tiZANidine (ZANAFLEX) tablet 4 mg  4 mg Oral TID    and PTA meds:   Prior to Admission Medications   Prescriptions Last Dose Informant Patient Reported? Taking? CLONAZEPAM PO   Yes Yes   Sig: Take 0 5 mg by mouth daily     Calcium Citrate-Vitamin D (CALCIUM + D PO)   Yes Yes   Sig: Take by mouth   Dalfampridine (AMPYRA PO)   Yes Yes   Sig: Take 10 mg by mouth 2 (two) times a day  Dalfampridine ER (AMPYRA) 10 MG TB12   Yes Yes   Sig: Take by mouth   HYDROcodone-acetaminophen (NORCO) 5-325 mg per tablet   Yes Yes   Sig: Take 1 tablet by mouth every 6 (six) hours as needed for pain   Interferon Beta-1a (REBIF REBIDOSE) 44 MCG/0 5ML SOAJ   Yes Yes   Sig: Inject under the skin 3 (three) times a week   Lactobacillus (ACIDOPHILUS PROBIOTIC) TABS   Yes Yes   Sig: Take by mouth   Multiple Vitamin (MULTIVITAMIN) tablet   Yes Yes   Sig: Take 1 tablet by mouth daily   OxyCODONE HCl (OXYCONTIN PO)   Yes Yes   Sig: Take 20 mg by mouth 2 (two) times a day     TiZANidine (ZANAFLEX) 4 MG capsule   Yes Yes   Sig: Take 4 mg by mouth 3 (three) times a day   b complex vitamins tablet   Yes Yes   Sig: Take 1 tablet by mouth daily  baclofen 20 mg tablet   Yes Yes   Sig: Take 20 mg by mouth 3 (three) times a day     celecoxib (CeleBREX) 200 mg capsule   Yes Yes   Sig: Take 200 mg by mouth   citalopram (CeleXA) 20 mg tablet   Yes Yes   Sig: Take 20 mg by mouth daily  gabapentin (NEURONTIN) 300 mg capsule   Yes Yes   Sig: Take 300 mg by mouth daily     levETIRAcetam (KEPPRA) 1000 MG tablet   Yes Yes   Sig: Take 750 mg by mouth 2 (two) times a day        Facility-Administered Medications: None       Allergies   Allergen Reactions    Fentanyl Other (See Comments)     Shakes/tremors    Tea Tree Oil     Ultram [Tramadol] Other (See Comments)     Shakes, tremors       Objective   Vitals:Blood pressure 113/56, pulse 61, temperature (!) 95 9 °F (35 5 °C), temperature source Tympanic, resp  rate 18, height 5' 6" (1 676 m), weight 60 1 kg (132 lb 7 9 oz), SpO2 96 %  ,Body mass index is 21 39 kg/m²  Intake/Output Summary (Last 24 hours) at 10/22/17 1005  Last data filed at 10/21/17 2103   Gross per 24 hour   Intake                0 ml   Output              550 ml   Net             -550 ml       Invasive Devices: Invasive Devices     Peripheral Intravenous Line            Peripheral IV 10/20/17 Right Antecubital 1 day                Physical Exam   Constitutional: He appears well-developed and well-nourished  No distress  HENT:   Head: Normocephalic and atraumatic  Mouth/Throat: No oropharyngeal exudate  Eyes: Conjunctivae are normal  No scleral icterus  Neck: Normal range of motion  Neck supple  Cardiovascular: Normal rate and regular rhythm  No murmur heard  Pulmonary/Chest: Effort normal and breath sounds normal  No respiratory distress  He has no rales  Abdominal: Soft  Bowel sounds are normal  There is no tenderness  There is no guarding  Musculoskeletal: Normal range of motion  He exhibits no edema  Neurological: He has an abnormal Finger-Nose-Finger Test (Left greater than right ataxia) and an abnormal Heel to Janee Mccreedy Test (Left greater than right ataxia)  Skin: Skin is warm and dry  No rash noted  He is not diaphoretic  No erythema  Psychiatric: He has a normal mood and affect  Vitals reviewed  Neurologic Exam     Mental Status   Oriented to person  Oriented to place  Disoriented to date  Oriented to year, month, day and season  Recall at 5 minutes: recalls 2 of 3 objects  Follows 2 step commands  Attention: normal  Concentration: normal    Speech: (Cerebellar type dysarthria, chronic)  Level of consciousness: alert  Able to name object  Able to read  Able to repeat  Normal comprehension  Rambling at times     Cranial Nerves   Visual fields are full  Pupils are reactive to light bilaterally  Increased hippus on the right  Extraocular movements are conjugate, but with saccadic pursuits  There is no facial weakness or sensory asymmetry  No jaw, lingual, or palatal weakness  Head turning and shoulder shrugs were full  Hearing is grossly intact  Motor Exam   Right arm tone: normal  Left arm tone: normal  Right arm pronator drift: absent  Left arm pronator drift: absent  Right leg tone: spastic (Right worse than left)  Left leg tone: spastic    Strength   Strength 5/5 except as noted  4+ right hip flexors, 4 right knee flexion     Sensory Exam   Right arm light touch: normal  Left arm light touch: normal  Right arm vibration: normal  Left arm vibration: normal  Right leg vibration: decreased from ankle  Left leg vibration: Decreased from mid shin  Pinprick normal      Gait, Coordination, and Reflexes     Coordination   Finger to nose coordination: abnormal (Left greater than right ataxia)  Heel to shin coordination: abnormal (Left greater than right ataxia)    Reflexes   Right plantar: equivocal  Left plantar: equivocal    The reflexes are hyperactive throughout  Gait was not assessed at this time       Lab Results:   I have personally reviewed pertinent reports    , CBC:   Results from last 7 days  Lab Units 10/22/17  0433 10/21/17  0457 10/20/17  1946 10/20/17  1919   WBC Thousand/uL 5 27 9 73  --  12 09*   RBC Million/uL 3 29* 3 39*  --  3 97   HEMOGLOBIN g/dL 10 4* 10 7*  --  12 5   I STAT HEMOGLOBIN g/dl  --   --  13 6  --    HEMATOCRIT % 31 2* 32 2*  --  37 4   MCV fL 95 95  --  94   PLATELETS Thousands/uL 189 225  --  269   , BMP/CMP:   Results from last 7 days  Lab Units 10/21/17  0457 10/20/17  1946 10/20/17  1919   SODIUM mmol/L 137  --  138   POTASSIUM mmol/L 4 4  --  3 9   CHLORIDE mmol/L 103  --  100   CO2 mmol/L 31  --  31   ANION GAP mmol/L 3*  --  7   BUN mg/dL 23  --  21   CREATININE mg/dL 1 36*  --  1 20   GLUCOSE RANDOM mg/dL 85  --  115   GLUCOSE, ISTAT mg/dl  --  109  --    CALCIUM mg/dL 8 9  --  9 9   AST U/L --   --  30   ALT U/L  --   --  32   ALK PHOS U/L  --   --  88   TOTAL PROTEIN g/dL  --   --  8 9*   ALBUMIN g/dL  --   --  4 1   BILIRUBIN TOTAL mg/dL  --   --  0 28   EGFR ml/min/1 73sq m 55 64 64   , TSH:       Invalid input(s): TSH, Urinalysis:   Results from last 7 days  Lab Units 10/20/17  2221   COLOR UA  Yellow   CLARITY UA  Clear   SPEC GRAV UA  1 020   PH UA  7 0   LEUKOCYTES UA  Negative   NITRITE UA  Negative   PROTEIN UA mg/dl 30 (1+)*   GLUCOSE UA mg/dl Negative   KETONES UA mg/dl 40 (2+)*   BILIRUBIN UA  Negative   BLOOD UA  Negative     Imaging Studies: I have personally reviewed pertinent films in PACS  EKG, Pathology, and Other Studies: I have personally reviewed pertinent reports          Code Status: Level 3 - DNAR and DNI  Advance Directive and Living Will:      Power of :    POLST:      Counseling / Coordination of Care  Total floor time 50 minutes

## 2017-10-22 NOTE — PROGRESS NOTES
Progress Note - Infectious Disease   Elsa Jenkins 57 y o  male MRN: [id number]  Unit/Bed#: [place] [address] -01 Encounter: [id number]      Impression/Recommendations:  1  Fever  There is no obvious source of active infection clinically  Patient is systemically well, without sepsis or systemic toxicity  Patient has had recurrent intermittent fever for the last 2 years, with negative ID workup  With patient's advanced MS, consider autonomic instability as reason for fever  Regardless, with patient clinically and systemically well without obvious source of active infection, especially given history of significant C difficile colitis with systemic antibiotic, I would continue to hold off on any empiric antibiotic  Observe off antibiotic  Monitor temperature/WBC  Follow-up on pending blood cultures  Consider total body indium scan if fever recurs      2   Mild leukocytosis  This is nonspecific  WBC has normalized  Without obvious active infection, as in above, I would monitor WBC off antibiotic  Monitor WBC off antibiotic      3   Mild urinary retension  Although patient does have risk for UTI due to his urinary tension, he has no urinary symptoms and his UA is completely normal  I doubt the patient has UTI clinically  Monitor for development on of any urinary symptoms      4  Chronic mid and low back pain  MRI of T-spine and L-spine without diskitis/vertebral osteomyelitis  Monitor back pain      5  History C difficile colitis secondary to systemic antibiotic, not responding to Flagyl but responded to vancomycin  Plan for discontinuation of systemic antibiotic as in above  Monitor for development of diarrhea      6  Advanced MS  Consider autonomic nerve involvement with MS as etiology of recurrent fever      Discussed with the patient in detail regarding the above plan  Antibiotics:  Off antibiotic    Subjective:  Patient feels well now  Temperature has been down    No chills  Strength in legs improving  Objective:  Vitals:  HR:  [61-74] 61  Resp:  [18-20] 18  BP: (100-129)/(55-66) 113/56  SpO2:  [91 %-96 %] 96 %  Temp (24hrs), Av 5 °F (36 4 °C), Min:95 9 °F (35 5 °C), Max:98 4 °F (36 9 °C)  Current: Temperature: (!) 95 9 °F (35 5 °C)    Physical Exam:     General: Awake, alert, cooperative, no distress  Lungs: Expansion symmetric, no rales, no wheezing, respirations unlabored  Heart[de-identified]  Regular rate and rhythm, S1 and S2 normal, no murmur  Abdomen: Soft, nondistended, non-tender, bowel sounds active all four quadrants,        no masses, no organomegaly  Extremities: No edema  No erythema/warmth  No ulcer  Nontender to palpation  Skin:  No rash  Invasive Devices     Peripheral Intravenous Line            Peripheral IV 10/20/17 Right Antecubital 1 day                Labs studies:   I have personally reviewed pertinent labs  Results from last 7 days  Lab Units 10/21/17  0457 10/20/17  1946 10/20/17  1919   SODIUM mmol/L 137  --  138   POTASSIUM mmol/L 4 4  --  3 9   CHLORIDE mmol/L 103  --  100   CO2 mmol/L 31  --  31   ANION GAP mmol/L 3*  --  7   BUN mg/dL 23  --  21   CREATININE mg/dL 1 36*  --  1 20   EGFR ml/min/1 73sq m 55 64 64   GLUCOSE RANDOM mg/dL 85  --  115   GLUCOSE, ISTAT mg/dl  --  109  --    CALCIUM mg/dL 8 9  --  9 9   AST U/L  --   --  30   ALT U/L  --   --  32   ALK PHOS U/L  --   --  88   TOTAL PROTEIN g/dL  --   --  8 9*   ALBUMIN g/dL  --   --  4 1   BILIRUBIN TOTAL mg/dL  --   --  0 28       Results from last 7 days  Lab Units 10/22/17  0433 10/21/17  0457 10/20/17  1946 10/20/17  1919   WBC Thousand/uL 5 27 9 73  --  12 09*   HEMOGLOBIN g/dL 10 4* 10 7*  --  12 5   I STAT HEMOGLOBIN g/dl  --   --  13 6  --    PLATELETS Thousands/uL 189 225  --  269       Results from last 7 days  Lab Units 10/20/17  1929 10/20/17  1919   BLOOD CULTURE  No Growth at 24 hrs  No Growth at 24 hrs         Imaging Studies:   I have personally reviewed pertinent imaging study reports and images in PACS  EKG, Pathology, and Other Studies:   I have personally reviewed pertinent reports

## 2017-10-23 VITALS
HEIGHT: 66 IN | SYSTOLIC BLOOD PRESSURE: 114 MMHG | OXYGEN SATURATION: 98 % | BODY MASS INDEX: 21.29 KG/M2 | HEART RATE: 63 BPM | DIASTOLIC BLOOD PRESSURE: 58 MMHG | WEIGHT: 132.5 LBS | RESPIRATION RATE: 18 BRPM | TEMPERATURE: 97.3 F

## 2017-10-23 PROCEDURE — 97163 PT EVAL HIGH COMPLEX 45 MIN: CPT

## 2017-10-23 PROCEDURE — G8979 MOBILITY GOAL STATUS: HCPCS

## 2017-10-23 PROCEDURE — G8978 MOBILITY CURRENT STATUS: HCPCS

## 2017-10-23 PROCEDURE — 80177 DRUG SCRN QUAN LEVETIRACETAM: CPT | Performed by: PSYCHIATRY & NEUROLOGY

## 2017-10-23 RX ADMIN — HEPARIN SODIUM 5000 UNITS: 5000 INJECTION, SOLUTION INTRAVENOUS; SUBCUTANEOUS at 05:57

## 2017-10-23 RX ADMIN — CITALOPRAM HYDROBROMIDE 40 MG: 20 TABLET ORAL at 08:05

## 2017-10-23 RX ADMIN — TIZANIDINE 4 MG: 4 TABLET ORAL at 04:06

## 2017-10-23 RX ADMIN — LEVETIRACETAM 750 MG: 750 TABLET ORAL at 08:05

## 2017-10-23 RX ADMIN — CELECOXIB 200 MG: 200 CAPSULE ORAL at 08:07

## 2017-10-23 RX ADMIN — ZINC 1 TABLET: TAB ORAL at 08:05

## 2017-10-23 RX ADMIN — OXYCODONE HYDROCHLORIDE 20 MG: 20 TABLET, FILM COATED, EXTENDED RELEASE ORAL at 08:05

## 2017-10-23 NOTE — PROGRESS NOTES
Progress Note - Infectious Disease   Tyrone Lawrence 57 y o  male MRN: [id number]  Unit/Bed#: [place] 2 -01 Encounter: [id number]      Impression/Recommendations:  1   Fever  Wava Paganini is no obvious source of active infection clinically   Patient is systemically well, without sepsis or systemic toxicity   Patient has had recurrent intermittent fever for the last 2 years, with negative ID workup   With patient's advanced MS, consider autonomic instability as reason for fever   Regardless, with patient clinically and systemically well without obvious source of active infection, especially given history of significant C difficile colitis with systemic antibiotic, I would continue to hold off on any empiric antibiotic  Observe off antibiotic     Monitor temperature/WBC  Follow-up on pending blood cultures  Consider total body indium scan if fever recurs      2   Mild leukocytosis   This is nonspecific    WBC has normalized  Without obvious active infection, as in above, I would monitor WBC off antibiotic  Monitor WBC off antibiotic      3   Mild urinary retension   Although patient does have risk for UTI due to his urinary tension, he has no urinary symptoms and his UA is completely normal  I doubt the patient has UTI clinically  Monitor for development on of any urinary symptoms      4   Chronic mid and low back pain   MRI of T-spine and L-spine without diskitis/vertebral osteomyelitis  Monitor back pain      5   History C difficile colitis secondary to systemic antibiotic, not responding to Flagyl but responded to vancomycin   Plan for discontinuation of systemic antibiotic as in above                           Monitor for development of diarrhea      6   Advanced MS   Consider autonomic nerve involvement with MS as etiology of recurrent fever    Plan for head and C-spine MRI noted      Discussed with the patient in detail regarding the above plan      Antibiotics:  Off antibiotic     Subjective:  Patient feels well   Temperature stays down  No chills  Strength in legs improving  No diarrhea  Objective:  Vitals:  HR:  [54-64] 63  Resp:  [17-18] 18  BP: (106-117)/(54-58) 114/58  SpO2:  [96 %-98 %] 98 %  Temp (24hrs), Av 3 °F (36 3 °C), Min:96 8 °F (36 °C), Max:97 9 °F (36 6 °C)  Current: Temperature: (!) 97 3 °F (36 3 °C)    Physical Exam:     General: Awake, alert, cooperative, no distress  Lungs: Expansion symmetric, no rales, no wheezing, respirations unlabored  Heart[de-identified]  Regular rate and rhythm, S1 and S2 normal, no murmur  Abdomen: Soft, nondistended, non-tender, bowel sounds active all four quadrants,        no masses, no organomegaly  Extremities: No edema  No erythema/warmth  No ulcer  Nontender to palpation  Skin:  No rash  Invasive Devices     Peripheral Intravenous Line            Peripheral IV 10/20/17 Right Antecubital 2 days                Labs studies:   I have personally reviewed pertinent labs  Results from last 7 days  Lab Units 10/21/17  0457 10/20/17  1946 10/20/17  1919   SODIUM mmol/L 137  --  138   POTASSIUM mmol/L 4 4  --  3 9   CHLORIDE mmol/L 103  --  100   CO2 mmol/L 31  --  31   ANION GAP mmol/L 3*  --  7   BUN mg/dL 23  --  21   CREATININE mg/dL 1 36*  --  1 20   EGFR ml/min/1 73sq m 55 64 64   GLUCOSE RANDOM mg/dL 85  --  115   GLUCOSE, ISTAT mg/dl  --  109  --    CALCIUM mg/dL 8 9  --  9 9   AST U/L  --   --  30   ALT U/L  --   --  32   ALK PHOS U/L  --   --  88   TOTAL PROTEIN g/dL  --   --  8 9*   ALBUMIN g/dL  --   --  4 1   BILIRUBIN TOTAL mg/dL  --   --  0 28       Results from last 7 days  Lab Units 10/22/17  0433 10/21/17  0457 10/20/17  1946 10/20/17  1919   WBC Thousand/uL 5 27 9 73  --  12 09*   HEMOGLOBIN g/dL 10 4* 10 7*  --  12 5   I STAT HEMOGLOBIN g/dl  --   --  13 6  --    PLATELETS Thousands/uL 189 225  --  269       Results from last 7 days  Lab Units 10/20/17  1929 10/20/17  1919   BLOOD CULTURE  No Growth at 48 hrs  No Growth at 48 hrs  Imaging Studies:   I have personally reviewed pertinent imaging study reports and images in PACS  EKG, Pathology, and Other Studies:   I have personally reviewed pertinent reports

## 2017-10-23 NOTE — PHYSICAL THERAPY NOTE
PT Evaluation (21min)  (7:05-7:26)    Past Medical History:   Diagnosis Date    ANANDA (acute kidney injury) ([facility] [address] ) 1/23/2016    Arthritis     lower back    Depression     Multiple sclerosis (HCC)     Psychiatric disorder     depression    Seizures (HCC)     pseudoseizures        10/23/17 0726   Note Type   Note type Eval only   Pain Assessment   Pain Assessment No/denies pain   Home Living   Type of [address] [facility] Two level;Stairs to enter with rails  (3-4 STEPHEN; 15 steps to 2nd floor c rail)   [address]   [address] [facility]; Other (Comment)  (rollator)   Prior Function   Level of [place] Independent with ADLs and functional mobility  (ambulates c rollator)   Lives With Spouse   ADL Assistance Independent   Falls in the last 6 months 0   Comments (-) drive   Restrictions/Precautions   Other Precautions Fall Risk   General   Additional Pertinent History pt presents to [facility] c increased weakness + fever of unknown origin  PT consulted for mobility + d/c planning  Family/Caregiver Present No   Cognition   Orientation Level Oriented X4   RUE Assessment   RUE Assessment WFL  (4/5)   LUE Assessment   LUE Assessment WFL  (4/5)   RLE Assessment   RLE Assessment WFL  (4/5)   LLE Assessment   LLE Assessment WFL  (4/5)   Coordination   Sensation WFL   Bed Mobility   Supine to Sit 5  Supervision   Additional items HOB elevated; Increased time required   Sit to Supine 5  Supervision   Additional items HOB elevated   Transfers   Sit to Stand 5  Supervision   Additional items Verbal cues   Stand to Sit 5  Supervision   Additional items Verbal cues   Ambulation/Elevation   Gait pattern Narrow JYOTSNA; Forward Flexion;Decreased foot clearance   Gait Assistance 5  Supervision   Additional items Verbal cues   Assistive Device Other (Comment)  (rollator)   Distance 60'x2   Balance   Static Sitting Good   Dynamic Sitting Good   Static Standing Fair   Dynamic Standing [address],4, & 5 - Activity Tolerance   Activity Tolerance Patient limited by fatigue   Nurse Made Aware Nikolai   Assessment   Prognosis Good   Problem List Decreased strength;Decreased endurance; Impaired balance;Decreased mobility   Assessment pt is a 61y/o m who presents to [facility] c increased weakness + fever of unknown origin  PMH significant for MS, depression, seizure, dysphagia, + chronic back pain  at baseline, pt mod (I) c mobility c rollator  resides c spouse in 2 story home c 3-4 STEPHEN + 15 steps to 2nd floor c rail  currently presents c mod decline in mobility c deficits in strength, balance, gait quality + activity tolerance noted in PT exam above  Barthel Index 65/100  increased time required to complete transitions  ambulates distances of 60'x2 c rollator limited by fatigue  pt educated to monitor signs of fatigue c good verbal understanding  would benefit from skilled PT to maximize functional mobility + return to [facility]  upon d/c, recommend pt return home c family support once medically cleared by MD  PT eval of high complexity 2* unstable med status c pt requiring ongoing medical management 2* fever of unknown origin + weakness  presents c mobility deficits above along c co-morbidities  resides in 2 story home c 3-4 STEPHEN + 15 steps to 2nd floor  Barriers to Discharge Inaccessible home environment   Goals   Patient Goals "to go home"  STG Expiration Date 10/28/17   Short Term Goal #1 1  increase strength 1/2 grade, 2  perform transfers mod (I), 3  ambulate 300' mod (I) c rollator to safely navigate home, 4  negotiate 15 steps mod (I) to access 2nd floor of home   Plan   Treatment/Interventions Functional transfer training;LE strengthening/ROM; Elevations; Therapeutic exercise; Endurance training;Patient/family training;Bed mobility;Gait training;Spoke to nursing   PT Frequency 2-3x/wk   Recommendation   Recommendation Home with family support   PT - OK to Discharge Yes   Barthel Index   Feeding 10   Bathing 0   Grooming Score 5   Dressing Score 5   Bladder Score 10   Bowels Score 10   Toilet Use Score 5   Transfers (Bed/Chair) Score 10   Mobility (Level Surface) Score 10   Stairs Score 0   Barthel Index Score 65     Rogelio Su, PT

## 2017-10-23 NOTE — PROGRESS NOTES
Progress Note - Patrick Cortes 57 y o  male MRN: [id number]    Unit/Bed#: [place] [address] -01 Encounter: [id number]      Subjective: The patient is feeling pretty well  He slept well  He is eating okay  He has no chest pain, shortness of breath, cough, sputum production, etc   He has had no abdominal pain, dysuria, diarrhea, or nausea  He did pretty well in physical therapy  He is awaiting an MRI of his brain and cervical spine  Physical Exam:   Temp:  [96 8 °F (36 °C)-97 9 °F (36 6 °C)] 97 3 °F (36 3 °C)  HR:  [54-64] 63  Resp:  [17-18] 18  BP: (106-117)/(54-58) 114/58    Gen:  Well-developed, frail, in no distress  Neck:  Supple  No lymphadenopathy, goiter, or bruit  Heart:  Regular rhythm  No murmur, gallop, or rub  Lungs:  Clear to auscultation and percussion  No wheezing, rales, or rhonchi    Abd:  Soft with active bowel sounds  No mass, tenderness, or organomegaly  Extremities:  No clubbing, cyanosis, or edema  No calf tenderness  Neuro:  Alert and oriented  Diffusely weak but can move all limbs  Skin:  Warm and dry      LABS:  No new labs    Patient Active Problem List   Diagnosis    [facility] sclerosis ([facility] [address] )    Depression    Weakness    [facility]    C  difficile colitis    CAP (community acquired pneumonia)    Dysphagia    Chronic back pain    [facility]    Immunocompromised state ([facility] [address] )    Fever, unknown origin       Assessment/Plan:  1  Fever, no evidence of sepsis, possibly related to autonomic instability  2  Multiple sclerosis  3  History of C difficile colitis, no symptoms of recurrence  4  Depression  5  History of seizure  6  Chronic pain with continued opioid dependence  7  Protein calorie malnutrition    The patient is doing well at present  He is afebrile and did well in physical therapy  Following his MRIs, it is likely he will be ready for discharge  He will be re-evaluated this afternoon after his MRIs are completed      VTE Pharmacologic Prophylaxis: Heparin  VTE Mechanical Prophylaxis: sequential compression device

## 2017-10-23 NOTE — PLAN OF CARE
Problem: PHYSICAL THERAPY ADULT  Goal: Performs mobility at highest level of function for planned discharge setting  See evaluation for individualized goals  Treatment/Interventions: Functional transfer training, LE strengthening/ROM, Elevations, Therapeutic exercise, Endurance training, Patient/family training, Bed mobility, Gait training, Spoke to nursing          See flowsheet documentation for full assessment, interventions and recommendations  Prognosis: Good  Problem List: Decreased strength, Decreased endurance, Impaired balance, Decreased mobility  Assessment: pt is a 61y/o m who presents to [facility] c increased weakness + fever of unknown origin  PMH significant for MS, depression, seizure, dysphagia, + chronic back pain  at baseline, pt mod (I) c mobility c rollator  resides c spouse in 2 story home c 3-4 STEPHEN + 15 steps to 2nd floor c rail  currently presents c mod decline in mobility c deficits in strength, balance, gait quality + activity tolerance noted in PT exam above  Barthel Index 65/100  increased time required to complete transitions  ambulates distances of 60'x2 c rollator limited by fatigue  pt educated to monitor signs of fatigue c good verbal understanding  would benefit from skilled PT to maximize functional mobility + return to [facility]  upon d/c, recommend pt return home c family support once medically cleared by MD  PT eval of high complexity 2* unstable med status c pt requiring ongoing medical management 2* fever of unknown origin + weakness  presents c mobility deficits above along c co-morbidities  resides in 2 story home c 3-4 STEPHEN + 15 steps to 2nd floor  Barriers to Discharge: Inaccessible home environment     Recommendation: Home with family support     PT - OK to Discharge: Yes    See flowsheet documentation for full assessment

## 2017-10-23 NOTE — CASE MANAGEMENT
Sherryn Monas, RN Registered Nurse Signed   Case Management Date of Service: 10/21/2017  2:18 PM           Initial Clinical Review     Admission: Date/Time/Statement: 10/20/17 @ 2330            Orders Placed This Encounter   Procedures    Inpatient Admission (expected length of stay for this patient is greater than two midnights)       Standing Status:   Standing       Number of Occurrences:   1       Order Specific Question:   Admitting Physician       Answer:   Juan Alberto Guillen [[id number]]       Order Specific Question:   Level of Care       Answer:   Med Surg [16]       Order Specific Question:   Estimated length of stay       Answer:   More than 2 Midnights       Order Specific Question:   Certification       Answer:   I certify that inpatient services are medically necessary for this patient for a duration of greater than two midnights  See H&P and MD Progress Notes for additional information about the patient's course of treatment             ED: Date/Time/Mode of Arrival:             ED Arrival Information      Expected Arrival Acuity Means of Arrival Escorted By Service Admission Type     - 10/20/2017 18:14 Urgent Ambulance [facility] General Medicine Urgent     Arrival Complaint     Pain             Chief Complaint:        Chief Complaint   Patient presents with    Back Pain       hx of chronic back pain states took his 20 mg oxycodone later then he normaly would and the pain became unbearable, per EMS pt had fever of 102 5 pre hospital           History of Illness:   Maritza Dee a 57 y  o  male with a history of secondary progressive multiple sclerosis who was at home today and felt significantly weak and presented to the emergency department via ambulance with a fever of 101 8  Tano Bland was unable to go from a sitting to standing position   He normally ambulates with a rolling walker   He was essentially stuck in his chair at home  Tano Bland had no nausea, vomiting or diarrhea and no dysuria   He does have some mild constipation   He does have some chronic urinary urgency but no burning and no pain recently  Mandeep Elizondo has had prior admissions for FUO      ED Vital Signs:            ED Triage Vitals [10/20/17 1822]   Temperature Pulse Respirations Blood Pressure SpO2   99 9 °F (37 7 °C) (!) 118 20 133/82 93 %       Temp Source Heart Rate Source Patient Position - Orthostatic VS BP Location FiO2 (%)   Oral Monitor Sitting Right arm --       Pain Score           5                Wt Readings from Last 1 Encounters:   10/21/17 60 1 kg (132 lb 7 9 oz)         Vital Signs (abnormal):   above     Abnormal Labs/Diagnostic Test Results:    WBC   12 09  MRI  L/S  Spine:  Scattered multilevel spondylosis without evidence of discitis/osteomyelitis   No epidural abscess  2   Chronic compression abnormalities of T12, L1 as well as to a lesser degree L4 with a mild grade 1 anterolisthesis of L4 on L5 and a moderate dextroscoliosis thoracolumbar junction  MRI  T/spine:    No evidence of discitis/osteomyelitis or epidural abscess  2   Scattered mild spondylotic changes  3   Chronic demyelinating lesions stable  4   Incidental thyroid nodule(s) for which thyroid ultrasound is recommended    CXR:    NAD     ED Treatment:              Medication Administration from 10/20/2017 1813 to 10/21/2017 0014        Date/Time Order Dose Route Action Action by Comments       10/20/2017 1948 sodium chloride (PF) 0 9 % injection 3 mL 3 mL Intravenous Given Dedra Washington RN         10/20/2017 1932 oxyCODONE (ROXICODONE) immediate release tablet 10 mg 10 mg Oral Given Yoni Cr RN         10/20/2017 1932 acetaminophen (TYLENOL) tablet 650 mg 650 mg Oral Given Dedra Washington RN         10/20/2017 2152 sodium chloride 0 9 % bolus 1,000 mL 0 mL Intravenous Stopped Dedra Washington RN         10/20/2017 1932 sodium chloride 0 9 % bolus 1,000 mL 1,000 mL Intravenous New Bag Yoni Cr RN         10/20/2017 2133 gadobutrol injection (MULTI-DOSE) SOLN 6 mL 6 mL Intravenous Given Elyse Repress         10/20/2017 9018 cefepime (MAXIPIME) 2 g/50 mL dextrose IVPB 0 mg Intravenous Stopped Romel Guzman RN         10/20/2017 7806 cefepime (MAXIPIME) 2 g/50 mL dextrose IVPB 2,000 mg Intravenous New Bag Travon Ng RN               Past Medical/Surgical History:         Active Ambulatory Problems     Diagnosis Date Noted    Multiple sclerosis ([place] [address] )      Depression      Weakness 01/20/2016    Seizure ([place] [address] ) 01/20/2016    C  difficile colitis 01/23/2016    CAP (community acquired pneumonia) 01/23/2016    Dysphagia 01/23/2016    Chronic back pain 01/23/2016    ANANDA (acute kidney injury) ([place] [address] ) 01/23/2016           Resolved Ambulatory Problems     Diagnosis Date Noted    No Resolved Ambulatory Problems           Past Medical History:   Diagnosis Date    ANANDA (acute kidney injury) ([place] [address] ) 1/23/2016    Arthritis      Depression      [facility] ([facility])      Psychiatric disorder      Seizures ([facility])           Admitting Diagnosis: Pain [R52]  Fever [R50 9]  Immunocompromised state ([place] [address] ) [D84 9]     Age/Sex: 57 y o  male     Assessment/Plan:   Fever, unknown origin     Plan:     Fever, unknown origin  · On admission 101 8 at 18:35 and 99 2 at 23:00 and elevated WBC-12 09  · Cultures of blood and urine pending  · Will empirically treat with cefepime 1 g IV q 8 hours  · Will consult Neurology     Multiple sclerosis, progressive  · Will continue on patient's home medications     History of C Diff  infection  · Precipitated by dual antibiotic treatment during a prior hospitalization and we will monitor closely for this     Depression  · Continue Celexa     History of seizure  · Continue Keppra     Chronic pain  Patient is on regimen of Neurontin, OxyContin and Norco we will continue these     Admission Orders:   IP   10/20  @    2331  Scheduled Meds:   baclofen 20 mg Oral HS   celecoxib 200 mg Oral Daily   [START ON 10/22/2017] citalopram 40 mg Oral Daily   clonazePAM 1 5 mg Oral HS   gabapentin 300 mg Oral HS   heparin (porcine) 5,000 Units Subcutaneous Q8H [address]   Interferon Beta-1a   Subcutaneous Once per day on Mon Wed Fri   lactobacillus acidophilus-bulgaricus 1 packet Oral TID With Meals   levETIRAcetam 750 mg Oral BID   multivitamin stress formula 1 tablet Oral Daily   NON FORMULARY 10 mg Oral Daily   NON FORMULARY   Oral Daily   oxyCODONE 20 mg Oral Q12H [address]   tiZANidine 4 mg Oral TID      Continuous Infusions:    PRN Meds:   acetaminophen    HYDROcodone-acetaminophen    influenza vaccine      Cons neuro  Cons  ID  Ref  Diet  [place] thick liq     [employer]  [facility] in the [employer] by Marty Bonilla for 2017  Network Utilization Review Department  Phone: [phone]; Fax [phone]  ATTENTION: The Network Utilization Review Department is now centralized for our 7 Facilities  Make a note that we have a new phone and fax numbers for our Department  Please call with any questions or concerns to [phone] and carefully follow the prompts so that you are directed to the right person  All voicemails are confidential  Fax any determinations, approvals, denials, and requests for initial or continue stay review clinical to [phone]  Due to HIGH CALL volume, it would be easier if you could please send faxed requests to expedite your requests and in part, help us provide discharge notifications faster

## 2017-10-25 LAB
BACTERIA BLD CULT: NORMAL
BACTERIA BLD CULT: NORMAL
LEVETIRACETAM SERPL-MCNC: 22 UG/ML (ref 10–40)

## 2017-10-30 NOTE — CASE MANAGEMENT
Notification of Discharge  This is a Notification of Discharge from our facility [address]  Please be advised that this patient has been discharge from our facility  Below you will find the admission and discharge date and time including the patients disposition  PRESENTATION DATE: 10/20/2017  6:15 PM  IP ADMISSION DATE: 10/20/17 2330  DISCHARGE DATE: 10/23/2017  2:14 PM  DISPOSITION: Home/Self Care    [address] [facility] in the [facility] by [place] Bonilla for 2017  Network Utilization Review Department  Phone: [phone]; Fax [phone]  ATTENTION: The Network Utilization Review Department is now centralized for our 7 Facilities  Make a note that we have a new phone and fax numbers for our Department  Please call with any questions or concerns to [phone] and carefully follow the prompts so that you are directed to the right person  All voicemails are confidential  Fax any determinations, approvals, denials, and requests for initial or continue stay review clinical to [phone]  Due to HIGH CALL volume, it would be easier if you could please send faxed requests to expedite your requests and in part, help us provide discharge notifications faster

## 2017-11-01 NOTE — DISCHARGE SUMMARY
Discharge Summary - Jaswinder Novoa 1955, [id number]        Admission Date: 10/20/2017  Discharge Date: 10/23/2017    Admitting Diagnosis: Pain [R52]  Fever [R50 9]  Immunocompromised state [facility]) [D84 9]    Discharge Diagnosis:   1  Fever, possibly related to autonomic instability, no evidence for sepsis  2  Multiple sclerosis  3  History of C difficile colitis, no evidence of recurrence  4  Depression  5  History of seizures  6  Chronic pain syndrome with continued opioid dependence  7  Protein calorie malnutrition    Consulting Physicians:  1   Dr Huan Akers, neurology  2  Dr Mekhi Hull, infectious Disease    Procedures Performed:   None    HPI:  The patient is a 70-year-old man with a history of secondary progressive multiple sclerosis who felt weak on the day of admission  He was found to have fever  He was unable to stand from a seated position  Normally he is able to ambulate with a rolling walker  Because of this he came to the emergency room for further evaluation  He was admitted for further care  Hospital Course: The patient was admitted to the hospital on appropriate cultures were obtained  He was started empirically on cefepime  Ultimately, cultures were nondiagnostic  The patient was seen in consultation by Neurology and by Infectious Disease  Despite significant evaluation, there was no evidence of an infectious etiology for the patient's fever  Was thought that this may represent autonomic instability related to his severe multiple sclerosis  The patient was not felt to have an obvious exacerbation of his multiple sclerosis  Rather was felt that his functional deterioration was related to his fever  Fortunately, during his stay he improved and returned to his usual baseline  The patient has a history of seizures  He remained on Keppra and is tolerating this well  The patient has a chronic pain syndrome    His usual medications were continued and this was not a significant problem during his stay  On the day of discharge the patient was feeling rather well  Vital signs were stable  Lungs were clear  Cardiac exam was normal   The abdomen was soft and nontender  Disposition:  The patient was discharged home on October 23rd  He will follow diet as tolerated  His activity will be as tolerated  He was asked to arrange follow-up with his personal physician, Dr Jazmin Evans, within 1 week  He will also follow up with [facility] Neurology  MRIs of the patient's spinal cord had been requested during his hospitalization but he preferred to pursue these as an outpatient  Discharge instructions/Information to patient and family:   See after visit summary for information provided to patient and family  Provisions for Follow-Up Care:  See after visit summary for information related to follow-up care and any pertinent home health orders  Planned Readmission: No    Discharge Statement   I spent 40 minutes discharging the patient  This time was spent on the day of discharge  I had direct contact with the patient on the day of discharge  Discharge Medications:  See after visit summary for reconciled discharge medications provided to patient and family

## 2018-01-10 NOTE — RESULT NOTES
Message   Pls let patient he continues to be anemic  Has he followed with PCP for this? Also, WBCs are a little low again (have fluctuated in the past)  Pls have him repeat CBC in 1 month, order in the chart     Verified Results  (1) COMPREHENSIVE METABOLIC PANEL [id number] 26:71OL femeninas     Test Name Result Flag Reference   GLUCOSE,RANDM 92 mg/dL     If the patient is fasting, the ADA then defines impaired fasting glucose as > 100 mg/dL and diabetes as > or equal to 123 mg/dL  SODIUM 142 mmol/L  136-145   POTASSIUM 4 2 mmol/L  3 5-5 3   CHLORIDE 104 mmol/L  100-108   CARBON DIOXIDE 29 mmol/L  21-32   ANION GAP (CALC) 9 mmol/L  4-13   BLOOD UREA NITROGEN 16 mg/dL  5-25   CREATININE 1 18 mg/dL  0 60-1 30   Standardized to IDMS reference method   CALCIUM 9 7 mg/dL  8 3-10 1   BILI, TOTAL 0 19 mg/dL L 0 20-1 00   ALK PHOSPHATAS 95 U/L     ALT (SGPT) 35 U/L  12-78   AST(SGOT) 30 U/L  5-45   ALBUMIN 4 0 g/dL  3 5-5 0   TOTAL PROTEIN 8 4 g/dL H 6 4-8 2   eGFR Non-African American      >60 0 ml/min/1 73sq [facility] Disease Education Program recommendations are as follows:  GFR calculation is accurate only with a steady state creatinine  Chronic Kidney disease less than 60 ml/min/1 73 sq  meters  Kidney failure less than 15 ml/min/1 73 sq  meters       (1) HEPATIC FUNCTION PANEL [id number] 04:13PM femeninas     Test Name Result Flag Reference   BILI, DIRECT 0 09 mg/dL  0 00-0 20     (1) CBC/PLT/DIFF [id number] 04:13PM femeninas     Test Name Result Flag Reference   WBC COUNT 3 97 Thousand/uL L 4 31-10 16   RBC COUNT 3 81 Million/uL L 3 88-5 62   HEMOGLOBIN 11 7 g/dL L 12 0-17 0   HEMATOCRIT 35 9 % L 36 5-49 3   MCV 94 fL  82-98   MCH 30 7 pg  26 8-34 3   MCHC 32 6 g/dL  31 4-37 4   RDW 12 9 %  11 6-15 1   MPV 10 5 fL  8 9-12 7   PLATELET COUNT 778 Thousands/uL  149-390   nRBC AUTOMATED 0 /100 WBCs     NEUTROPHILS RELATIVE PERCENT 61 %  43-75   LYMPHOCYTES RELATIVE PERCENT 26 % 14-44   MONOCYTES RELATIVE PERCENT 11 %  4-12   EOSINOPHILS RELATIVE PERCENT 2 %  0-6   BASOPHILS RELATIVE PERCENT 0 %  0-1   NEUTROPHILS ABSOLUTE COUNT 2 45 Thousands/?L  1 85-7 62   LYMPHOCYTES ABSOLUTE COUNT 1 02 Thousands/?L  0 60-4 47   MONOCYTES ABSOLUTE COUNT 0 43 Thousand/?L  0 17-1 22   EOSINOPHILS ABSOLUTE COUNT 0 06 Thousand/?L  0 00-0 61   BASOPHILS ABSOLUTE COUNT 0 01 Thousands/?L  0 00-0 10       Plan  Multiple sclerosis    · (1) CBC/PLT/DIFF; Status:Active;  Requested for:[id number];

## 2018-01-11 ENCOUNTER — LAB (OUTPATIENT)
Dept: LAB | Facility: MEDICAL CENTER | Age: 63
End: 2018-01-11

## 2018-01-11 ENCOUNTER — TRANSCRIBE ORDERS (OUTPATIENT)
Dept: ADMINISTRATIVE | Facility: HOSPITAL | Age: 63
End: 2018-01-11

## 2018-01-11 ENCOUNTER — ALLSCRIPTS OFFICE VISIT (OUTPATIENT)
Dept: OTHER | Facility: OTHER | Age: 63
End: 2018-01-11

## 2018-01-11 DIAGNOSIS — G35 MULTIPLE SCLEROSIS (HCC): Primary | ICD-10-CM

## 2018-01-11 DIAGNOSIS — G35 MULTIPLE SCLEROSIS (HCC): ICD-10-CM

## 2018-01-11 LAB
ALBUMIN SERPL BCP-MCNC: 4 G/DL (ref 3.5–5)
ALP SERPL-CCNC: 93 U/L (ref 46–116)
ALT SERPL W P-5'-P-CCNC: 47 U/L (ref 12–78)
ANION GAP SERPL CALCULATED.3IONS-SCNC: 5 MMOL/L (ref 4–13)
AST SERPL W P-5'-P-CCNC: 33 U/L (ref 5–45)
BASOPHILS # BLD AUTO: 0.03 THOUSANDS/ΜL (ref 0–0.1)
BASOPHILS NFR BLD AUTO: 1 % (ref 0–1)
BILIRUB SERPL-MCNC: 0.21 MG/DL (ref 0.2–1)
BUN SERPL-MCNC: 26 MG/DL (ref 5–25)
CALCIUM SERPL-MCNC: 9.8 MG/DL (ref 8.3–10.1)
CHLORIDE SERPL-SCNC: 101 MMOL/L (ref 100–108)
CO2 SERPL-SCNC: 31 MMOL/L (ref 21–32)
CREAT SERPL-MCNC: 1.38 MG/DL (ref 0.6–1.3)
EOSINOPHIL # BLD AUTO: 0.07 THOUSAND/ΜL (ref 0–0.61)
EOSINOPHIL NFR BLD AUTO: 2 % (ref 0–6)
ERYTHROCYTE [DISTWIDTH] IN BLOOD BY AUTOMATED COUNT: 12.3 % (ref 11.6–15.1)
ERYTHROCYTE [DISTWIDTH] IN BLOOD BY AUTOMATED COUNT: 12.4 % (ref 11.6–15.1)
GFR SERPL CREATININE-BSD FRML MDRD: 54 ML/MIN/1.73SQ M
GLUCOSE P FAST SERPL-MCNC: 85 MG/DL (ref 65–99)
HCT VFR BLD AUTO: 36.8 % (ref 36.5–49.3)
HCT VFR BLD AUTO: 37.1 % (ref 36.5–49.3)
HGB BLD-MCNC: 12.4 G/DL (ref 12–17)
HGB BLD-MCNC: 12.4 G/DL (ref 12–17)
LYMPHOCYTES # BLD AUTO: 1.04 THOUSANDS/ΜL (ref 0.6–4.47)
LYMPHOCYTES NFR BLD AUTO: 24 % (ref 14–44)
MCH RBC QN AUTO: 31.6 PG (ref 26.8–34.3)
MCH RBC QN AUTO: 32 PG (ref 26.8–34.3)
MCHC RBC AUTO-ENTMCNC: 33.4 G/DL (ref 31.4–37.4)
MCHC RBC AUTO-ENTMCNC: 33.7 G/DL (ref 31.4–37.4)
MCV RBC AUTO: 95 FL (ref 82–98)
MCV RBC AUTO: 95 FL (ref 82–98)
MONOCYTES # BLD AUTO: 0.66 THOUSAND/ΜL (ref 0.17–1.22)
MONOCYTES NFR BLD AUTO: 15 % (ref 4–12)
NEUTROPHILS # BLD AUTO: 2.5 THOUSANDS/ΜL (ref 1.85–7.62)
NEUTS SEG NFR BLD AUTO: 58 % (ref 43–75)
NRBC BLD AUTO-RTO: 0 /100 WBCS
PLATELET # BLD AUTO: 246 THOUSANDS/UL (ref 149–390)
PLATELET # BLD AUTO: 252 THOUSANDS/UL (ref 149–390)
PMV BLD AUTO: 10.7 FL (ref 8.9–12.7)
PMV BLD AUTO: 10.8 FL (ref 8.9–12.7)
POTASSIUM SERPL-SCNC: 4.4 MMOL/L (ref 3.5–5.3)
PROT SERPL-MCNC: 8.7 G/DL (ref 6.4–8.2)
RBC # BLD AUTO: 3.88 MILLION/UL (ref 3.88–5.62)
RBC # BLD AUTO: 3.92 MILLION/UL (ref 3.88–5.62)
SODIUM SERPL-SCNC: 137 MMOL/L (ref 136–145)
WBC # BLD AUTO: 4.31 THOUSAND/UL (ref 4.31–10.16)
WBC # BLD AUTO: 4.5 THOUSAND/UL (ref 4.31–10.16)

## 2018-01-11 PROCEDURE — 85025 COMPLETE CBC W/AUTO DIFF WBC: CPT

## 2018-01-11 PROCEDURE — 80053 COMPREHEN METABOLIC PANEL: CPT

## 2018-01-11 PROCEDURE — 36415 COLL VENOUS BLD VENIPUNCTURE: CPT

## 2018-01-11 PROCEDURE — 85027 COMPLETE CBC AUTOMATED: CPT

## 2018-01-11 NOTE — RESULT NOTES
Message   please let pt know his gfr for his kidneys is on the lower side at 50  any changes in his bladder?  recommend sending these labs to pcp  please tell pt he needs to follow up iwth his pcp for his kidney function  is pt due for any updcoming mris?  he should not get dye for these studies until seen by pcp or renal   i will put in consultation for renal as well  please check who pt is seeing as his pcp  please notify their office that we are sending over abn renal labs       Verified Results  (1) CBC/PLT/DIFF [id number] 01:28PM Delroy Gallegos Order Number: [id number]  TW Order Number: [id number]     Test Name Result Flag Reference   WBC COUNT 5 42 Thousand/uL  4 31-10 16   RBC COUNT 4 31 Million/uL  3 88-5 62   HEMOGLOBIN 12 7 g/dL  12 0-17 0   HEMATOCRIT 38 7 %  36 5-49 3   MCV 90 fL  82-98   MCH 29 5 pg  26 8-34 3   MCHC 32 8 g/dL  31 4-37 4   RDW 13 5 %  11 6-15 1   MPV 10 7 fL  8 9-12 7   PLATELET COUNT 322 Thousands/uL  149-390   nRBC AUTOMATED 0 /100 WBCs     NEUTROPHILS RELATIVE PERCENT 71 %  43-75   LYMPHOCYTES RELATIVE PERCENT 16 %  14-44   MONOCYTES RELATIVE PERCENT 10 %  4-12   EOSINOPHILS RELATIVE PERCENT 2 %  0-6   BASOPHILS RELATIVE PERCENT 1 %  0-1   NEUTROPHILS ABSOLUTE COUNT 3 85 Thousands/?L  1 85-7 62   LYMPHOCYTES ABSOLUTE COUNT 0 88 Thousands/?L  0 60-4 47   MONOCYTES ABSOLUTE COUNT 0 53 Thousand/?L  0 17-1 22   EOSINOPHILS ABSOLUTE COUNT 0 12 Thousand/?L  0 00-0 61   BASOPHILS ABSOLUTE COUNT 0 03 Thousands/?L  0 00-0 10     (1) HEPATIC FUNCTION PANEL [id number] 01:28PM Delroy Gallegos Order Number: [id number]  TW Order Number: [id number] Order Number: [id number] Order Number: [id number]     Test Name Result Flag Reference   ALBUMIN 3 9 g/dL  3 5-5 0   ALK PHOSPHATAS 95 U/L     ALT (SGPT) 51 U/L  12-78   AST(SGOT) 36 U/L  5-45   BILI, DIRECT 0 09 mg/dL  0 00-0 20   BILI, TOTAL 0 26 mg/dL  0 20-1 00   TOTAL PROTEIN 8 3 g/dL H 6 4-8 2     (1) CREATININE [id number] 01: 1019 [facility] Order Number: [id number]  TW Order Number: [id number] Order Number: [id number] Order Number: [id number]     Test Name Result Flag Reference   CREATININE 1 41 mg/dL H 0 60-1 30   Standardized to IDMS reference method   eGFR Non-African American 50 8 ml/min/1 73sq m     National Kidney Disease Education Program recommendations are as follows:  GFR calculation is accurate only with a steady state creatinine  Chronic Kidney disease less than 60 ml/min/1 73 sq  meters  Kidney failure less than 15 ml/min/1 73 sq  meters         Plan  Decreased GFR    · 1 - Louann PIMENTEL, Bess Miles  (Nephrology) Physician Referral  Consult  Status: Active   Requested for: 60GQP9832  Care Summary provided  : Yes

## 2018-01-12 VITALS
BODY MASS INDEX: 19.33 KG/M2 | DIASTOLIC BLOOD PRESSURE: 72 MMHG | HEART RATE: 76 BPM | SYSTOLIC BLOOD PRESSURE: 120 MMHG | WEIGHT: 135 LBS | RESPIRATION RATE: 16 BRPM | HEIGHT: 70 IN

## 2018-01-12 NOTE — PROGRESS NOTES
Assessment   1  Multiple sclerosis (340) (G35)    Plan   Multiple sclerosis    · (1) CBC/PLT/DIFF; Status:Active; Requested YRZ:[id number]; Perform:[facility]; RLN:[id number];MWYODIV; For:Multiple sclerosis; Ordered By:Kendy Kidd;   · (1) COMPREHENSIVE METABOLIC PANEL; Status:Active; Requested ABL:[id number]; Perform:[facility]; EWW:[id number];QPXSMKD; For:Multiple sclerosis; Ordered By:Kendy Kidd;    Discussion/Summary   Discussion Summary:    Patient is here for MS follow up remains on Rebif and overall tolerating well continues on Ampyra for his walking was hospitalized in mid October for fever, generalized weakness  No infectious etiology found, cultures all negative  He was seen by ID  It was felt the fevers could possibly be from autonomic instability due to his MS  He will occasionally get fevers of unknown origin and no source of infection will be found had updated MRI t-spine, which was stable  MRI lumbar also completed and this did show some chronic compression abnormalities, but no acute abnormalities had non-specific leukocytosis which resolved  Keppra level normal at 22  LFTs normal, renal function stable went back to baseline during his hospital stay and then was discharged home he has been doing well  No new neurologic symptoms getting a thermometer to periodically check temp at home is stable today update labs to ensure normal white count, LFTs and renal function change to medication regimen at this time up in 4 months--already scheduled with Dr Jean Rich in May for any new symptoms  Counseling Documentation With Imm: The patient, patient's family was counseled regarding diagnostic results,-- instructions for management,-- risk factor reductions,-- prognosis,-- patient and family education,-- impressions,-- risks and benefits of treatment options,-- importance of compliance with treatment  total time of encounter was 25 minutes-- and-- >50% minutes was spent counseling        Chief Complaint   Chief Complaint Free Text Note Form: Patient present for follow up appt regarding MS  History of Present Illness   HPI: Patient is a 57year old male with PMH of MS who presents today for neurology follow up  Patient has been on Novantrone and Avonex in the past for his MS  He was switched to Tecfidera at the end of 2013 and had to come off due to low absolute lymphs at 0 39  He has been off since March 2015  In April 2015 he called with increased weakness, falls  He had updated MRI brain with one new enhancing lesion  He was given ACTHAR due to inability to get steroids due to lymphopenia  He tolerated it well, however did not see much improvement  Also in April of 2015 he needed a blood transfusion for anemia with Hb 6 8  He is currently on Rebif, which was started in June 2015  He has tolerated the Rebif well  At visit in March 2016, he was following up from 5 hospitalizations from Oct 2015-Jan 2016  He had admits for pneumonia, c  diff, weakness  MRI brain 10/18/15 no new lesions, no acute ischemia  MRI c-spine Oct 2015 stable moderate chronic demyelinating disease in the c-cord and upper t-cord  There was also a question of seizure activity during one of his admits  His keppra had been increased to 1000mg BID, but then back down to 750mg BID, which is his normal dose  He denies any breakthrough seizure activity  MRI brain and c-spine done again in Nov 2015, both stable  He had updated EEG at Nov 2015 hospital stay  8hr video EEG slightly abnormal 8hr VEEG due to mild degree of frontal slowing  No clear epileptic seizures  He has remained on Ampyra  He has tried to stop it in the past, but walking got worse  He is aware of increased risk of seizures with Ampyra     had updated MRI head done on Nov 16 2016 and this was also stable comp to one yr prior  At last visit, Dr Omero Jaquez increased his celexa to 40mg  Patient aware of increased risk of seizures with both Ampyra and celexa  last seen in Sept 2017   He reports he is doing well, denies any new symptoms  On 10/20/17 he was hospitalized due to generalized weakness and fever, non-specific mild leukocytosis  He was started on empiric antibiotics, but cultures came back negative  He was seen by ID and no infectious cause of his fever  It was felt this possibly represented autonomic instability due to his MS  Neurology also saw him and did not feel he was having an MS exacerbation  MRI t-spine updated and stable  MRI lumbar spine also performed and this did demonstrate chronic compression abnormalities of T12, L1 as well as to a lesser degree L4 with a mild grade 1 anterolisthesis of L4 on L5 and a moderate dextroscoliosis thoracolumbar junction  Leukocytosis and fever resolved and returned to baseline and was discharged  Keppra level was stable at 22 0  LFTs normal  Renal function was stable  He remains on Rebif and tolerating well  He also continues on Domonique [place]  Denies any seizure activity  Denies vision changes  No change in bowel or bladder, speech or swallowing  Review of Systems   Neurological ROS:      Constitutional: fever  HEENT:  no sinus problems, not feeling congested, no blurred vision, no dryness of the eyes, no eye pain, no hearing loss, no tinnitus, no mouth sores, no sore throat, no hoarseness, no dysphagia, no masses, no bleeding  Cardiovascular:  no chest pain or pressure, no palpitations present, the heart rate was not rapid or irregular, no swelling in the arms or legs, no poor circulation  Respiratory:  no unusual or persistant cough, no shortness of breath with or without exertion  Gastrointestinal:  no nausea, no vomiting, no diarrhea, no abdominal pain, no changes in bowel habits, no melena, no loss of bowel control  Genitourinary: feelings of urinary urgency  Musculoskeletal:  no arthralgias, no myalgias, no immobility or loss of function, no head/neck/back pain, no pain while walking  Integumentary  no masses, no rash, no skin lesions, no livedo reticularis  Psychiatric:  no anxiety, no depression, no mood swings, no psychiatric hospitalizations, no sleep problems  Endocrine erection difficulty--   no unusual weight loss or gain, no excessive urination, no excessive thirst, no hair loss or gain, no hot or cold intolerance, no menstrual period change or irregularity, no loss of sexual ability or drive, no erection difficulty, no nipple discharge  Hematologic/Lymphatic:  no unusual bleeding, no tendency for easy bruising, no clotting skin or lumps  Neurological General:  no headache, no nausea or vomiting, no lightheadedness, no convulsions, no blackouts, no syncope, no trauma, no photopsia, no increased sleepiness, no trouble falling asleep, no snoring, no awakening at night  Neurological Mental Status:  no confusion, no mood swings, no alteration or loss of consciousness, no difficulty expressing/understanding speech, no memory problems  Neurological Cranial Nerves:  no blurry or double vision, no loss of vision, no face drooping, no facial numbness or weakness, no taste or smell loss/changes, no hearing loss or ringing, no vertigo or dizziness, no dysphagia, no slurred speech  Neurological Motor findings include:  no tremor, no twitching, no cramping(pre/post exercise), no atrophy  Neurological Coordination:  no unsteadiness, no vertigo or dizziness, no clumsiness, no problems reaching for objects  Neurological Sensory:  no numbness, no pain, no tingling, does not fall when eyes closed or taking a shower  Neurological Gait:  no difficulty walking, not falling to one side, no sensation of being pushed, has not had falls  ROS Reviewed:    ROS reviewed  Active Problems   1  Abnormal blood chemistry (790 6) (R79 9)   2  Abnormal urinary stream (788 69) (R39 198)   3  Acute blood loss anemia (285 1) (D62)   4   ANANDA (acute kidney injury) (584 9) (N17 9)   5  Backache (724 5) (M54 9)   6  Closed fracture of thoracic vertebra (805 2) (S22 009A)   7  Decreased GFR (794 4) (R94 4)   8  Depression (311) (F32 9)   9  Fatigue (780 79) (R53 83)   10  Fever (780 60) (R50 9)   11  Fracture of rib of left side (807 00) (S22 32XA)   12  Gait abnormality (781 2) (R26 9)   13  Hemopneumothorax, left (511 89) (J94 2)   14  Multiple sclerosis (340) (G35)   15  Osteoarthritis (715 90) (M19 90)   16  Osteopenia (733 90) (M85 80)   17  Pain syndrome, chronic (338 4) (G89 4)   18  Ringing in the ears (388 30) (H93 19)   19  Thoracic spondylosis (721 2) (M47 814)   20  Urinary frequency (788 41) (R35 0)   21  Weakness of both lower extremities (729 89) (R29 898)    Past Medical History   1  History of Chest wall pain (786 52) (R07 89)   2  Depression (311) (F32 9)   3  Fatigue (780 79) (R53 83)   4  History of Gastric ulcer (531 90) (K25 9)   5  History of shortness of breath (V13 89) (Z87 898)   6  History of Seizure (780 39) (R56 9)   7  History of Use of cane as ambulatory aid (719 7) (R26 2)   8  History of Wears eyeglasses (V49 89) (Z97 3)  Active Problems And Past Medical History Reviewed: The active problems and past medical history were reviewed and updated today  Surgical History   Surgical History Reviewed: The surgical history was reviewed and updated today  Family History   Mother    1  Family history of osteoarthritis (V17 89) (Z82 69)   2  Family history of osteoporosis (V17 81) (Z82 62)   3  Family history of Osteoarthritis (V17 7)  Father    4  Family history of cardiac disorder (V17 49) (Z82 49)  Family History Reviewed: The family history was reviewed and updated today         Social History    · Denied: History of Alcohol Use (History)   · Always uses seat belt   · Caffeine Use   · Daily caffeine consumption, 1 serving a day   · Does not exercise (V69 0) (Z72 3)   · Denied: History of Drug Use   · Former smoker (V15 82) (X06 608)   · Functioning activity level   · High school or GED   · Marital History - Currently    · Never Drank Alcohol   · Sexually Active With Persons At Risk For HIV-related Disease (V69 2)  Social History Reviewed: The social history was reviewed and updated today  Current Meds    1  Acidophilus Probiotic Oral Tablet; Therapy: (Recorded:09May2016) to Recorded   2  Ampyra 10 MG Oral Tablet Extended Release 12 Hour; TAKE 1 TABLET BY MOUTH     TWICE A DAY (APPROXIMATELY EVERY 12 HOURS); Therapy: 83YRL8397 to (Evaluate:28Dec2017)  Requested for: 03ULF5518; Last     Rx:28Nov2017 Ordered   3  B Complete TABS; Therapy: (Recorded:09May2016) to Recorded   4  Baclofen 20 MG Oral Tablet; TAKE 1 TABLET THREE TIMES A DAY AS NEEDED; Therapy: 65COQ9230 to (87 89 79)  Requested for: 22PGE2469; Last     Rx:45Aof1320 Ordered   5  Calcium + D TABS; Therapy: (Recorded:11Nov2013) to Recorded   6  Celecoxib 200 MG Oral Capsule; TAKE 1 CAPSULE EVERY DAY; Therapy: 41BUZ6665 to (Evaluate:20Feb2018)  Requested for: 68JML9284; Last     Rx:22Nov2017 Ordered   7  Citalopram Hydrobromide 20 MG Oral Tablet; TAKE 2 TABLETS EVERY DAY; Therapy: 72JPB8904 to (Evaluate:02Aug2018)  Requested for: 55ECX0234; Last     Rx:05Nov2017 Ordered   8  ClonazePAM 0 5 MG Oral Tablet; TAKE 3 TABLETS BY MOUTH EVERY DAY AT BEDTIME; Therapy: 88ZRU6013 to (Evaluate:15Feb2018)  Requested for: 22JJY2219; Last     Rx:17Nov2017 Ordered   9  Gabapentin 300 MG Oral Capsule; TAKE 1 CAPSULE Bedtime; Therapy: 72IGO9589 to (Evaluate:12Jun2018)  Requested for: 37VQE8381; Last     Rx:44Hae8774 Ordered   10  Hydrocodone-Acetaminophen 5-325 MG Oral Tablet; TAKE 1 TABLET Every 6 hours; Therapy: (Recorded:09May2016) to Recorded   11  LevETIRAcetam 750 MG Oral Tablet; take 1 tablet by mouth twice a day; Therapy: 85MLE5526 to (YMWSILAA:69CYL0540)  Requested for: 95TUX7899; Last      Rx:25Jun2017 Ordered   12   Multi-Day TABS; Therapy: (Recorded:21Mar2014) to Recorded   13  OxyCONTIN 20 MG TB12; TAKE 1 TABLET TWICE DAILY; Therapy: (Recorded:98Jrg8494) to Recorded   14  Rebif Rebidose 44 MCG/0 5ML Subcutaneous Solution Auto-injector; INJECT 44 MCG      (0 5ML) UNDER THE SKIN 3 TIMES PER WEEK; Therapy: 20SNY6444 to (Last JT:90QWZ2196)  Requested for: 02LAX7450 Ordered   15  TiZANidine HCl - 4 MG Oral Tablet; Therapy: 12Apr2011 to (Last Rx:12Apr2011)  Requested for: 12Apr2011 Ordered   16  Vitamin D CAPS; Therapy: (Recorded:11Nov2013) to Recorded  Medication List Reviewed: The medication list was reviewed and updated today  Allergies   1  Tea Tree OIL   2  Duragesic-25 PT72   3  Fentanyl POWD   4  ketamine   5  tramadol   6  Ultram TABS  Denied    7  Anesthesia Extension Tubing Miscellaneous    Vitals   Signs   Recorded: 68WTE3926 01:49PM   Heart Rate: 80  Systolic: 635  Diastolic: 67  Height: 5 ft 10 in  Weight: 137 lb   BMI Calculated: 19 66  BSA Calculated: 1 78  O2 Saturation: 95    Physical Exam        Constitutional      General appearance: No acute distress, well appearing and well nourished  Musculoskeletal      Gait and station: Abnormal  -- WIDE BASED USING ROLLATOR  DRAGGING RIGHT FOOT  Muscle strength: Abnormal  -- 4+/5 LEFT HF, RIGHT 3,  MAFO IN PLACE  Muscle tone: Abnormal  -- CLONUS B/L  Involuntary movements: Abnormal involuntary movements were observed  -- MILD TREMOR OUTSTRETCHED ARMS RIGHT> LEFT  Neurologic      Orientation to person, place, and time: Normal        Recent and remote memory: Demonstrates normal memory  Attention span and concentration: Normal thought process and attention span  Language: Abnormal  -- MILD DYSARTHRIA  Fund of knowledge: Normal vocabulary with appropriate knowledge of current events and past history         2nd cranial nerve: Normal        3rd, 4th, and 6th cranial nerves: Normal        5th cranial nerve: Normal        7th cranial nerve: Normal  -- SUBTLE FACIAL ASYM  8th cranial nerve: Normal        9th cranial nerve: Normal        11th cranial nerve: Normal        12th cranial nerve: Normal        Sensation: Normal  -- DEC VIB DEANNA LOWERS  Reflexes: Abnormal  -- INCREASED IN LOWERS  Coordination: Abnormal  -- DIFF WITH MILD DYSMETRIA DEANNA UPPERS ON FINGER TO NOSE  Mood and affect: Normal        Results/Data   Diagnostic Studies Reviewed: I personally reviewed the films/images/results in the office today  My interpretation follows  Attending Note   Collaborating Physician Note: Collaborating Note: I agree with the Advanced Practitioner note  Future Appointments      Date/Time Provider Specialty Site   05/29/2018 02:00 PM TOBIAS Crump   Neurology [id number]     Signatures    Electronically signed by : David Vega, Sujit Huertas; Jan 11 2018  2:41PM EST                       (Author)     Electronically signed by : TOBIAS Govea ; Jan 11 2018  2:59PM EST                       (Co-author)

## 2018-01-13 VITALS — RESPIRATION RATE: 16 BRPM | DIASTOLIC BLOOD PRESSURE: 87 MMHG | HEART RATE: 130 BPM | SYSTOLIC BLOOD PRESSURE: 134 MMHG

## 2018-01-14 VITALS
DIASTOLIC BLOOD PRESSURE: 60 MMHG | BODY MASS INDEX: 19.22 KG/M2 | WEIGHT: 134.25 LBS | HEIGHT: 70 IN | HEART RATE: 88 BPM | RESPIRATION RATE: 16 BRPM | SYSTOLIC BLOOD PRESSURE: 120 MMHG

## 2018-01-14 VITALS
RESPIRATION RATE: 16 BRPM | DIASTOLIC BLOOD PRESSURE: 86 MMHG | WEIGHT: 136.5 LBS | HEART RATE: 110 BPM | SYSTOLIC BLOOD PRESSURE: 146 MMHG | BODY MASS INDEX: 19.54 KG/M2 | HEIGHT: 70 IN

## 2018-01-17 NOTE — RESULT NOTES
Message   Please let patient know white blood cell count is low  any recent illness? Also, he is anemic  Needs to follow with PCP and need to repeat CBC in 1 month  Pls send copy of labs to PCP  Order for CBC in 1 month is in the chart  thanks     Verified Results  (1) COMPREHENSIVE METABOLIC PANEL 97ICQ9582 52:54AQ Olimpia Haney Order Number: [id number]  TW Order Number: [id number]     Test Name Result Flag Reference   GLUCOSE,RANDM 81 mg/dL     If the patient is fasting, the ADA then defines impaired fasting glucose as > 100 mg/dL and diabetes as > or equal to 123 mg/dL  SODIUM 138 mmol/L  136-145   POTASSIUM 3 9 mmol/L  3 5-5 3   CHLORIDE 100 mmol/L  100-108   CARBON DIOXIDE 32 mmol/L  21-32   ANION GAP (CALC) 6 mmol/L  4-13   BLOOD UREA NITROGEN 23 mg/dL  5-25   CREATININE 1 24 mg/dL  0 60-1 30   Standardized to IDMS reference method   CALCIUM 9 6 mg/dL  8 3-10 1   BILI, TOTAL 0 46 mg/dL  0 20-1 00   ALK PHOSPHATAS 84 U/L     ALT (SGPT) 28 U/L  12-78   AST(SGOT) 22 U/L  5-45   ALBUMIN 3 7 g/dL  3 5-5 0   TOTAL PROTEIN 8 3 g/dL H 6 4-8 2   eGFR Non-African American 58 9 ml/min/1 73sq [facility] Disease Education Program recommendations are as follows:  GFR calculation is accurate only with a steady state creatinine  Chronic Kidney disease less than 60 ml/min/1 73 sq  meters  Kidney failure less than 15 ml/min/1 73 sq  meters       (1) CBC/PLT/DIFF 67Vrp7982 12:27PM Olimpia Haney Order Number: [id number]  TW Order Number: [id number]     Test Name Result Flag Reference   WBC COUNT 3 31 Thousand/uL L 4 31-10 16   RBC COUNT 3 86 Million/uL L 3 88-5 62   HEMOGLOBIN 11 6 g/dL L 12 0-17 0   HEMATOCRIT 35 3 % L 36 5-49 3   MCV 92 fL  82-98   MCH 30 1 pg  26 8-34 3   MCHC 32 9 g/dL  31 4-37 4   RDW 13 3 %  11 6-15 1   MPV 10 4 fL  8 9-12 7   PLATELET COUNT 097 Thousands/uL  149-390   nRBC AUTOMATED 0 /100 WBCs     NEUTROPHILS RELATIVE PERCENT 60 %  43-75   LYMPHOCYTES RELATIVE PERCENT 22 %  14-44   MONOCYTES RELATIVE PERCENT 15 % H 4-12   EOSINOPHILS RELATIVE PERCENT 2 %  0-6   BASOPHILS RELATIVE PERCENT 1 %  0-1   NEUTROPHILS ABSOLUTE COUNT 1 99 Thousands/?L  1 85-7 62   LYMPHOCYTES ABSOLUTE COUNT 0 73 Thousands/?L  0 60-4 47   MONOCYTES ABSOLUTE COUNT 0 51 Thousand/?L  0 17-1 22   EOSINOPHILS ABSOLUTE COUNT 0 06 Thousand/?L  0 00-0 61   BASOPHILS ABSOLUTE COUNT 0 02 Thousands/?L  0 00-0 10       Plan  Multiple sclerosis    · (1) CBC/PLT/DIFF; Status:Active;  Requested for:74Fnv2345;

## 2018-01-17 NOTE — RESULT NOTES
Message   Pls let patient know his labs show kidney function is decreased more than normal   He needs to follow with PCP for this  Pls send copy of labs to PCP and have him call them to follow up     Verified Results  (1) CBC/PLT/DIFF [id number] 12:06PM Jenny Nino     Test Name Result Flag Reference   WBC COUNT 6 79 Thousand/uL  4 31-10 16   RBC COUNT 3 70 Million/uL L 3 88-5 62   HEMOGLOBIN 11 1 g/dL L 12 0-17 0   HEMATOCRIT 33 8 % L 36 5-49 3   MCV 91 fL  82-98   MCH 30 0 pg  26 8-34 3   MCHC 32 8 g/dL  31 4-37 4   RDW 12 7 %  11 6-15 1   MPV 10 8 fL  8 9-12 7   PLATELET COUNT 134 Thousands/uL  149-390   nRBC AUTOMATED 0 /100 WBCs     NEUTROPHILS RELATIVE PERCENT 79 % H 43-75   LYMPHOCYTES RELATIVE PERCENT 13 % L 14-44   MONOCYTES RELATIVE PERCENT 7 %  4-12   EOSINOPHILS RELATIVE PERCENT 1 %  0-6   BASOPHILS RELATIVE PERCENT 0 %  0-1   NEUTROPHILS ABSOLUTE COUNT 5 41 Thousands/?L  1 85-7 62   LYMPHOCYTES ABSOLUTE COUNT 0 85 Thousands/?L  0 60-4 47   MONOCYTES ABSOLUTE COUNT 0 45 Thousand/?L  0 17-1 22   EOSINOPHILS ABSOLUTE COUNT 0 06 Thousand/?L  0 00-0 61   BASOPHILS ABSOLUTE COUNT 0 01 Thousands/?L  0 00-0 10     (1) COMPREHENSIVE METABOLIC PANEL 44RQO1498 39:51WA Jenny Nino     Test Name Result Flag Reference   GLUCOSE,RANDM 119 mg/dL     If the patient is fasting, the ADA then defines impaired fasting glucose as > 100 mg/dL and diabetes as > or equal to 123 mg/dL     SODIUM 141 mmol/L  136-145   POTASSIUM 4 5 mmol/L  3 5-5 3   CHLORIDE 105 mmol/L  100-108   CARBON DIOXIDE 32 mmol/L  21-32   ANION GAP (CALC) 4 mmol/L  4-13   BLOOD UREA NITROGEN 35 mg/dL H 5-25   CREATININE 1 71 mg/dL H 0 60-1 30   Standardized to IDMS reference method   CALCIUM 10 0 mg/dL  8 3-10 1   BILI, TOTAL 0 25 mg/dL  0 20-1 00   ALK PHOSPHATAS 79 U/L     ALT (SGPT) 23 U/L  12-78   AST(SGOT) 19 U/L  5-45   ALBUMIN 3 5 g/dL  3 5-5 0   TOTAL PROTEIN 8 2 g/dL  6 4-8 2   eGFR Non-African American 40 6 ml/min/1 73sq m National Kidney Disease Education Program recommendations are as follows:  GFR calculation is accurate only with a steady state creatinine  Chronic Kidney disease less than 60 ml/min/1 73 sq  meters  Kidney failure less than 15 ml/min/1 73 sq  meters

## 2018-01-22 VITALS
BODY MASS INDEX: 19.61 KG/M2 | SYSTOLIC BLOOD PRESSURE: 114 MMHG | HEIGHT: 70 IN | HEART RATE: 80 BPM | WEIGHT: 137 LBS | OXYGEN SATURATION: 95 % | DIASTOLIC BLOOD PRESSURE: 67 MMHG

## 2018-02-06 DIAGNOSIS — G35 MULTIPLE SCLEROSIS (HCC): Primary | ICD-10-CM

## 2018-02-06 RX ORDER — DALFAMPRIDINE 10 MG/1
TABLET, FILM COATED, EXTENDED RELEASE ORAL
Qty: 60 TABLET | Refills: 0 | Status: SHIPPED | OUTPATIENT
Start: 2018-02-06 | End: 2018-03-02 | Stop reason: SDUPTHER

## 2018-02-26 ENCOUNTER — TELEPHONE (OUTPATIENT)
Dept: NEUROLOGY | Facility: CLINIC | Age: 63
End: 2018-02-26

## 2018-02-26 DIAGNOSIS — G35 MULTIPLE SCLEROSIS (HCC): Primary | ICD-10-CM

## 2018-02-26 NOTE — TELEPHONE ENCOUNTER
Fyi: Per pt he takes Zanaflex 4 mg capsule at bedtime  Notes and previously rx recorded was Zanaflex 4 mg capsule tid

## 2018-02-27 DIAGNOSIS — G35 MULTIPLE SCLEROSIS (HCC): Primary | ICD-10-CM

## 2018-02-27 RX ORDER — CLONAZEPAM 0.5 MG/1
TABLET ORAL
Qty: 270 TABLET | Refills: 0 | OUTPATIENT
Start: 2018-02-27 | End: 2018-05-31 | Stop reason: SDUPTHER

## 2018-02-27 RX ORDER — CLONAZEPAM 0.5 MG/1
TABLET ORAL
Qty: 270 TABLET | Refills: 0 | OUTPATIENT
Start: 2018-02-27

## 2018-02-27 RX ORDER — TIZANIDINE HYDROCHLORIDE 4 MG/1
4 CAPSULE, GELATIN COATED ORAL
Qty: 90 CAPSULE | Refills: 0 | OUTPATIENT
Start: 2018-02-27

## 2018-02-27 NOTE — TELEPHONE ENCOUNTER
Pt called back to inform you he requested tizanidine refill  in error  PM prescribes med and he has already contacted them so please disregard   Please authorize clonazepam

## 2018-02-27 NOTE — TELEPHONE ENCOUNTER
I have no idea how to approve one med and refuse another  I am going to create a new encounter to refill clonazepam   It will need to be called in    I am just going to "refuse" both of these and then open a new encounter to refill clonazepam

## 2018-03-02 DIAGNOSIS — G35 MULTIPLE SCLEROSIS (HCC): ICD-10-CM

## 2018-03-02 RX ORDER — DALFAMPRIDINE 10 MG/1
TABLET, FILM COATED, EXTENDED RELEASE ORAL
Qty: 60 TABLET | Refills: 5 | Status: SHIPPED | OUTPATIENT
Start: 2018-03-02 | End: 2018-08-21 | Stop reason: SDUPTHER

## 2018-03-09 ENCOUNTER — APPOINTMENT (EMERGENCY)
Dept: RADIOLOGY | Facility: HOSPITAL | Age: 63
End: 2018-03-09
Payer: COMMERCIAL

## 2018-03-09 ENCOUNTER — HOSPITAL ENCOUNTER (EMERGENCY)
Facility: HOSPITAL | Age: 63
Discharge: HOME | End: 2018-03-09
Admitting: EMERGENCY MEDICINE
Payer: COMMERCIAL

## 2018-03-09 VITALS
WEIGHT: 134 LBS | DIASTOLIC BLOOD PRESSURE: 77 MMHG | RESPIRATION RATE: 16 BRPM | TEMPERATURE: 98.5 F | HEART RATE: 101 BPM | OXYGEN SATURATION: 96 % | BODY MASS INDEX: 19.23 KG/M2 | SYSTOLIC BLOOD PRESSURE: 151 MMHG

## 2018-03-09 DIAGNOSIS — S63.274A CLOSED DISLOCATION OF INTERPHALANGEAL JOINT OF RIGHT RING FINGER: Primary | ICD-10-CM

## 2018-03-09 PROCEDURE — 73140 X-RAY EXAM OF FINGER(S): CPT

## 2018-03-09 PROCEDURE — 99283 EMERGENCY DEPT VISIT LOW MDM: CPT

## 2018-03-09 RX ORDER — LIDOCAINE HYDROCHLORIDE 20 MG/ML
5 INJECTION, SOLUTION EPIDURAL; INFILTRATION; INTRACAUDAL; PERINEURAL ONCE
Status: COMPLETED | OUTPATIENT
Start: 2018-03-09 | End: 2018-03-09

## 2018-03-09 RX ORDER — ACETAMINOPHEN 325 MG/1
650 TABLET ORAL ONCE
Status: COMPLETED | OUTPATIENT
Start: 2018-03-09 | End: 2018-03-09

## 2018-03-09 RX ADMIN — LIDOCAINE HYDROCHLORIDE 5 ML: 20 INJECTION, SOLUTION EPIDURAL; INFILTRATION; INTRACAUDAL; PERINEURAL at 07:30

## 2018-03-09 RX ADMIN — ACETAMINOPHEN 650 MG: 325 TABLET, FILM COATED ORAL at 07:00

## 2018-03-09 NOTE — ED PROVIDER NOTES
History  Chief Complaint   Patient presents with    Fall     Pt reports slipped and fell this morning, pt reports did not hit head and no LOC  Pt c/o pain to right 4th finger  Pt denies any blood thinners  PT denies any pain to neck or back   Finger Injury       71 yo M with history of MS right hand dominant who presents for evaluation of finger injury that occurred just prior to arrival   Patient states he tripped at home and fell into the wall sustaining an injury to his ring finger of his right hand  He describes a constant throbbing pain located at the PIP with some mild swelling and deformity  He has decreased range of motion  He denies any paresthesias numbness or tingling  He received Tylenol in triage  No head injury or LOC  Prior to Admission Medications   Prescriptions Last Dose Informant Patient Reported? Taking? AMPYRA 10 MG TB12   No No   Sig: TAKE 1 TABLET BY MOUTH TWICE A DAY (EVERY 12 HOURS) WITH OR WITHOUT FOOD  SWALLOW WHOLE DO NOT BREAK, CRUSH, DISSOLVE OR CHEW  Calcium Citrate-Vitamin D (CALCIUM + D PO)   Yes No   Sig: Take by mouth   Dalfampridine (AMPYRA PO)   Yes No   Sig: Take 10 mg by mouth 2 (two) times a day  HYDROcodone-acetaminophen (NORCO) 5-325 mg per tablet   Yes No   Sig: Take 1 tablet by mouth every 6 (six) hours as needed for pain   Interferon Beta-1a (REBIF REBIDOSE) 44 MCG/0 5ML SOAJ   Yes No   Sig: Inject under the skin 3 (three) times a week   Lactobacillus (ACIDOPHILUS PROBIOTIC) TABS   Yes No   Sig: Take by mouth   Multiple Vitamin (MULTIVITAMIN) tablet   Yes No   Sig: Take 1 tablet by mouth daily   OxyCODONE HCl (OXYCONTIN PO)   Yes No   Sig: Take 20 mg by mouth 2 (two) times a day     TiZANidine (ZANAFLEX) 4 MG capsule   Yes No   Sig: Take 4 mg by mouth 3 (three) times a day   b complex vitamins tablet   Yes No   Sig: Take 1 tablet by mouth daily     baclofen 20 mg tablet   Yes No   Sig: Take 20 mg by mouth 3 (three) times a day     celecoxib (CeleBREX) 200 mg capsule   Yes No   Sig: Take 200 mg by mouth   citalopram (CeleXA) 20 mg tablet   Yes No   Sig: Take 20 mg by mouth daily  clonazePAM (KlonoPIN) 0 5 mg tablet   No No   Sig: Take 3 tabs at bedtime   gabapentin (NEURONTIN) 300 mg capsule   Yes No   Sig: Take 300 mg by mouth daily  levETIRAcetam (KEPPRA) 1000 MG tablet   Yes No   Sig: Take 750 mg by mouth 2 (two) times a day        Facility-Administered Medications: None       Past Medical History:   Diagnosis Date    ANANDA (acute kidney injury) ([facility] ) 1/23/2016    Arthritis     lower back    Depression     Multiple sclerosis ([facility] )     Psychiatric disorder     depression    Seizures ([place] [address] )     pseudoseizures       History reviewed  No pertinent surgical history  Family History   Problem Relation Age of Onset    Heart disease Father      I have reviewed and agree with the history as documented  Social History   Substance Use Topics    Smoking status: Current Every Day Smoker     Packs/day: 1 00     Types: E-Cigarettes     Last attempt to quit: 1/20/2012    Smokeless tobacco: Never Used      Comment: Pt states he quit smoking by using electronic cigarettes  Now does not smoke anything    Alcohol use No        Review of Systems   Musculoskeletal: Positive for arthralgias  Skin: Negative for color change and wound  Neurological: Negative for weakness and numbness  Physical Exam  ED Triage Vitals [03/09/18 0647]   Temperature Pulse Respirations Blood Pressure SpO2   98 5 °F (36 9 °C) 101 16 151/77 96 %      Temp Source Heart Rate Source Patient Position - Orthostatic VS BP Location FiO2 (%)   Temporal Monitor Sitting Right arm --      Pain Score       6           Orthostatic Vital Signs  Vitals:    03/09/18 0647   BP: 151/77   Pulse: 101   Patient Position - Orthostatic VS: Sitting       Physical Exam   Constitutional: He is oriented to person, place, and time  He appears well-developed and well-nourished  No distress     HENT: Head: Normocephalic and atraumatic  Right Ear: External ear normal    Left Ear: External ear normal    Nose: Nose normal    Mouth/Throat: Oropharynx is clear and moist    Eyes: Conjunctivae and EOM are normal  Pupils are equal, round, and reactive to light  Neck: Normal range of motion  Neck supple  Cardiovascular: Normal rate, regular rhythm and normal heart sounds  Exam reveals no gallop and no friction rub  No murmur heard  Pulmonary/Chest: Effort normal and breath sounds normal  No respiratory distress  He has no wheezes  He has no rales  Musculoskeletal:        Right hand: He exhibits decreased range of motion, tenderness, bony tenderness, deformity and swelling  He exhibits normal two-point discrimination, normal capillary refill and no laceration  Normal sensation noted  Decreased strength noted  Hands:  Neurological: He is alert and oriented to person, place, and time  Skin: Skin is warm and dry  No rash noted  He is not diaphoretic  Psychiatric: He has a normal mood and affect  Nursing note and vitals reviewed  ED Medications  Medications   acetaminophen (TYLENOL) tablet 650 mg (650 mg Oral Given 3/9/18 0700)   lidocaine (PF) (XYLOCAINE-MPF) 2 % injection 5 mL (5 mL Infiltration Given 3/9/18 0730)       Diagnostic Studies  Results Reviewed     None                 XR finger fourth digit - ring RIGHT   Final Result by Jo-Ann Melo MD (03/09 8355)      Status post closed reduction of the 4th PIP joint posterior dislocation, mild persistent posterior subluxation of the joint space  No fracture  Workstation performed: [id number]         XR finger fourth digit - ring RIGHT   Final Result by Babar 6, DO (03/09 9758)      4th finger PIP joint posterior dislocation           Workstation performed: [id number]                    Procedures  Orthopedic Injury  Date/Time: 3/9/2018 7:52 AM  Performed by: Charlene Purdy  Authorized by: Charlene Purdy Consent: Verbal consent obtained  Patient identity confirmed: verbally with patient  Injury location: finger  Location details: right ring finger  Injury type: dislocation  Dislocation type: PIP  Pre-procedure neurovascular assessment: neurovascularly intact  Pre-procedure distal perfusion: normal  Pre-procedure neurological function: normal  Pre-procedure range of motion: reduced  Anesthesia: digital block    Anesthesia:  Local anesthesia used: yes  Local Anesthetic: lidocaine 2% without epinephrine  Anesthetic total: 3 mL  Manipulation performed: yes  Reduction successful: yes  X-ray confirmed reduction: yes  Immobilization: splint  Splint type: finger splint, static  Post-procedure neurovascular assessment: post-procedure neurovascularly intact  Post-procedure distal perfusion: normal  Post-procedure neurological function: normal  Post-procedure range of motion: improved  Post-procedure range of motion comment: patient has full active ROM of digit without pain  Patient tolerance: Patient tolerated the procedure well with no immediate complications             Phone Contacts  ED Phone Contact    ED Course  ED Course                                MDM  Number of Diagnoses or Management Options  Closed dislocation of interphalangeal joint of right ring finger: new and requires workup  Diagnosis management comments:   69 yo M with injury to right ring finger  Xray confirms PIP joint dislocation  Digital block was performed and finger was reduced  Patients pain resolved and his ROM was normal  He was placed in a finger splint and instructed to f/u with hand  Patient and family verbalize understanding and agree with plan         Amount and/or Complexity of Data Reviewed  Tests in the radiology section of CPT®: ordered and reviewed  Independent visualization of images, tracings, or specimens: yes    Patient Progress  Patient progress: stable    CritCare Time    Disposition  Final diagnoses:   Closed dislocation of interphalangeal joint of right ring finger - PIP     Time reflects when diagnosis was documented in both MDM as applicable and the Disposition within this note     Time User Action Codes Description Comment    3/9/2018  8:34 AM Francisco Pepper Add [L38 571U] Closed dislocation of interphalangeal joint of right ring finger     3/9/2018  8:34 AM Brittany Pepper Modify [S63 274A] Closed dislocation of interphalangeal joint of right ring finger PIP      ED Disposition     ED Disposition Condition Comment    Discharge  Jamar Grace discharge to home/self care  Condition at discharge: Good        Follow-up Information     Follow up With Specialties Details Why Contact Info Additional [place] MD [facility]  FINGER FOLLOW UP [place] [id number]  [phone]       [facility] Emergency Department Emergency Medicine  If symptoms worsen -DISLOCATION [facility]  [phone] [place] ED, [address] [place] [address] , [place], [place], [address]        Patient's Medications   Discharge Prescriptions    No medications on file     No discharge procedures on file      ED Provider  Electronically Signed by           Gonzalez Casiano PA-C  03/09/18 5352

## 2018-03-09 NOTE — DISCHARGE INSTRUCTIONS
Finger Dislocation   WHAT YOU NEED TO KNOW:   A finger dislocation occurs when bones in your finger move out of their normal position  This takes place at a joint (where bones meet)  DISCHARGE INSTRUCTIONS:   Care for your finger:  Care for your finger as directed  This may help reduce pain and swelling  It also may improve how well you can move and use your finger  · Ice your finger: This can help decrease pain and swelling  Place a plastic bag filled with ice, or an ice pack, on your finger  Apply an ice pack as often as directed  · Elevate your finger:  Keep your finger above the level of your heart  This can help reduce swelling  Prop your arm or hand on a pillow  This should be done as often as you can for the first 1 to 3 days after your injury  Medicines:   · Pain medicine: You may be given medicine to take at home to take away or decrease pain  Do not wait until the pain is too bad before taking your medicine  · Take your medicine as directed  Contact your healthcare provider if you think your medicine is not helping or if you have side effects  Tell him or her if you are allergic to any medicine  Keep a list of the medicines, vitamins, and herbs you take  Include the amounts, and when and why you take them  Bring the list or the pill bottles to follow-up visits  Carry your medicine list with you in case of an emergency  Exercise your finger:  Exercise can help reduce pain, swelling, and stiffness in your finger  It also can help increase strength and movement  You may need to exercise your finger as soon as you can  You also may be told not to move your finger for a few weeks  Be sure to follow your healthcare provider's instructions  Occupational therapy (OT) may be ordered for you  A therapist works with you to help you regain movement in your finger    Care for your splint or cast:  Your injured finger may be joes-taped, splinted, or put in a cast to hold your finger or thumb in place while it heals  Joel tape is when your injured finger is taped to the finger next to it  A splint is a piece of stiff material attached to your finger using cloth straps  You may have these treatments for 8 weeks or more  To care for your splint or cast:  · Do not get your splint or cast wet  Use a plastic bag to cover the splint or cast if you shower  · Keep your splint or cast clean  Make sure no dirt gets under your splint or cast     · Do not trim your cast without talking to your healthcare provider  Also, never remove your cast on your own  Follow up with your healthcare provider or hand specialist as directed:  Write down your questions so you remember to ask them during your follow-up visits  Contact your healthcare provider or hand specialist if:   · You have numbness or tingling in your hand  · The skin under your cast or splint burns or stings  · The skin around your cast becomes red or raw  · Your cast becomes cracked or damaged  · You have questions or concerns about your injury or treatment  Return to the emergency department if:   · You have increased swelling beneath your splint or cast     · You think your cast or splint is too tight  · You cannot move your fingers  © 2017 [address] [employer]  Information is for End User's use only and may not be sold, redistributed or otherwise used for commercial purposes  All illustrations and images included in CareNotes® are the copyrighted property of [employer]  or Marty Bonilla  The above information is an  only  It is not intended as medical advice for individual conditions or treatments  Talk to your doctor, nurse or pharmacist before following any medical regimen to see if it is safe and effective for you

## 2018-03-24 DIAGNOSIS — G35 MULTIPLE SCLEROSIS (HCC): Primary | ICD-10-CM

## 2018-03-26 RX ORDER — BACLOFEN 20 MG/1
TABLET ORAL
Qty: 170 TABLET | Refills: 3 | Status: SHIPPED | OUTPATIENT
Start: 2018-03-26 | End: 2018-10-16 | Stop reason: ALTCHOICE

## 2018-04-02 DIAGNOSIS — G40.909 SEIZURE DISORDER (HCC): Primary | ICD-10-CM

## 2018-04-02 RX ORDER — LEVETIRACETAM 750 MG/1
750 TABLET ORAL 2 TIMES DAILY
Qty: 180 TABLET | Refills: 0 | Status: SHIPPED | OUTPATIENT
Start: 2018-04-02 | End: 2018-06-14 | Stop reason: SDUPTHER

## 2018-04-02 NOTE — TELEPHONE ENCOUNTER
Patient states he is completely out of medication; didn't realize he was out of medication  Patient states he took last dose yesterday morning

## 2018-04-25 ENCOUNTER — DOCUMENTATION (OUTPATIENT)
Dept: NEUROLOGY | Facility: CLINIC | Age: 63
End: 2018-04-25

## 2018-05-04 ENCOUNTER — TELEPHONE (OUTPATIENT)
Dept: NEUROLOGY | Facility: CLINIC | Age: 63
End: 2018-05-04

## 2018-05-04 NOTE — TELEPHONE ENCOUNTER
Pt calls re Shy Zavaleta; per [place] ([employer]), states PA was never completed  PA was submitted on 4/25 via CMM via [employer], pt only has medical w/[employer]  I did call [employer] @[phone], I did complete PA over the phone  BRIAN Mason Approved x1year #[id number]

## 2018-05-31 DIAGNOSIS — G35 MULTIPLE SCLEROSIS (HCC): ICD-10-CM

## 2018-05-31 RX ORDER — CLONAZEPAM 0.5 MG/1
TABLET ORAL
Qty: 270 TABLET | Refills: 0 | Status: SHIPPED | OUTPATIENT
Start: 2018-05-31 | End: 2018-08-31 | Stop reason: SDUPTHER

## 2018-06-12 DIAGNOSIS — G35 MULTIPLE SCLEROSIS (HCC): Primary | ICD-10-CM

## 2018-06-12 RX ORDER — GABAPENTIN 300 MG/1
CAPSULE ORAL
Qty: 90 CAPSULE | Refills: 1 | Status: ON HOLD | OUTPATIENT
Start: 2018-06-12 | End: 2018-09-11

## 2018-06-14 ENCOUNTER — APPOINTMENT (OUTPATIENT)
Dept: LAB | Facility: MEDICAL CENTER | Age: 63
End: 2018-06-14
Payer: COMMERCIAL

## 2018-06-14 ENCOUNTER — OFFICE VISIT (OUTPATIENT)
Dept: NEUROLOGY | Facility: CLINIC | Age: 63
End: 2018-06-14
Payer: COMMERCIAL

## 2018-06-14 VITALS
HEIGHT: 66 IN | DIASTOLIC BLOOD PRESSURE: 70 MMHG | HEART RATE: 77 BPM | WEIGHT: 142.8 LBS | SYSTOLIC BLOOD PRESSURE: 127 MMHG | BODY MASS INDEX: 22.95 KG/M2

## 2018-06-14 DIAGNOSIS — G40.909 SEIZURE DISORDER (HCC): ICD-10-CM

## 2018-06-14 DIAGNOSIS — G35 MULTIPLE SCLEROSIS (HCC): ICD-10-CM

## 2018-06-14 DIAGNOSIS — G35 MULTIPLE SCLEROSIS (HCC): Primary | ICD-10-CM

## 2018-06-14 LAB
ALBUMIN SERPL BCP-MCNC: 3.7 G/DL (ref 3.5–5)
ALP SERPL-CCNC: 91 U/L (ref 46–116)
ALT SERPL W P-5'-P-CCNC: 40 U/L (ref 12–78)
ANION GAP SERPL CALCULATED.3IONS-SCNC: 7 MMOL/L (ref 4–13)
AST SERPL W P-5'-P-CCNC: 32 U/L (ref 5–45)
BASOPHILS # BLD AUTO: 0.05 THOUSANDS/ΜL (ref 0–0.1)
BASOPHILS NFR BLD AUTO: 1 % (ref 0–1)
BILIRUB SERPL-MCNC: 0.3 MG/DL (ref 0.2–1)
BUN SERPL-MCNC: 30 MG/DL (ref 5–25)
CALCIUM SERPL-MCNC: 9.4 MG/DL (ref 8.3–10.1)
CHLORIDE SERPL-SCNC: 106 MMOL/L (ref 100–108)
CO2 SERPL-SCNC: 30 MMOL/L (ref 21–32)
CREAT SERPL-MCNC: 1.47 MG/DL (ref 0.6–1.3)
EOSINOPHIL # BLD AUTO: 0.13 THOUSAND/ΜL (ref 0–0.61)
EOSINOPHIL NFR BLD AUTO: 3 % (ref 0–6)
ERYTHROCYTE [DISTWIDTH] IN BLOOD BY AUTOMATED COUNT: 12 % (ref 11.6–15.1)
GFR SERPL CREATININE-BSD FRML MDRD: 49 ML/MIN/1.73SQ M
GLUCOSE SERPL-MCNC: 89 MG/DL (ref 65–140)
HCT VFR BLD AUTO: 37.1 % (ref 36.5–49.3)
HGB BLD-MCNC: 11.8 G/DL (ref 12–17)
IMM GRANULOCYTES # BLD AUTO: 0.01 THOUSAND/UL (ref 0–0.2)
IMM GRANULOCYTES NFR BLD AUTO: 0 % (ref 0–2)
LYMPHOCYTES # BLD AUTO: 1.35 THOUSANDS/ΜL (ref 0.6–4.47)
LYMPHOCYTES NFR BLD AUTO: 28 % (ref 14–44)
MCH RBC QN AUTO: 31.2 PG (ref 26.8–34.3)
MCHC RBC AUTO-ENTMCNC: 31.8 G/DL (ref 31.4–37.4)
MCV RBC AUTO: 98 FL (ref 82–98)
MONOCYTES # BLD AUTO: 0.71 THOUSAND/ΜL (ref 0.17–1.22)
MONOCYTES NFR BLD AUTO: 15 % (ref 4–12)
NEUTROPHILS # BLD AUTO: 2.64 THOUSANDS/ΜL (ref 1.85–7.62)
NEUTS SEG NFR BLD AUTO: 53 % (ref 43–75)
NRBC BLD AUTO-RTO: 0 /100 WBCS
PLATELET # BLD AUTO: 214 THOUSANDS/UL (ref 149–390)
PMV BLD AUTO: 10.8 FL (ref 8.9–12.7)
POTASSIUM SERPL-SCNC: 4.5 MMOL/L (ref 3.5–5.3)
PROT SERPL-MCNC: 8 G/DL (ref 6.4–8.2)
RBC # BLD AUTO: 3.78 MILLION/UL (ref 3.88–5.62)
SODIUM SERPL-SCNC: 143 MMOL/L (ref 136–145)
WBC # BLD AUTO: 4.89 THOUSAND/UL (ref 4.31–10.16)

## 2018-06-14 PROCEDURE — 80177 DRUG SCRN QUAN LEVETIRACETAM: CPT

## 2018-06-14 PROCEDURE — 36415 COLL VENOUS BLD VENIPUNCTURE: CPT

## 2018-06-14 PROCEDURE — 80053 COMPREHEN METABOLIC PANEL: CPT

## 2018-06-14 PROCEDURE — 85025 COMPLETE CBC W/AUTO DIFF WBC: CPT

## 2018-06-14 PROCEDURE — 99214 OFFICE O/P EST MOD 30 MIN: CPT | Performed by: PHYSICIAN ASSISTANT

## 2018-06-14 RX ORDER — LEVETIRACETAM 750 MG/1
750 TABLET ORAL 2 TIMES DAILY
Qty: 180 TABLET | Refills: 0 | Status: SHIPPED | OUTPATIENT
Start: 2018-06-14 | End: 2018-06-19 | Stop reason: DRUGHIGH

## 2018-06-14 NOTE — PATIENT INSTRUCTIONS
Continue same medications  No changes  Will update labs and MRI brain at this time  Exam is stable today  Follow up in 4 months or sooner if needed  Call for any new symptoms

## 2018-06-14 NOTE — PROGRESS NOTES
Patient ID: Mika Goodwin is a 70 y o  male  Assessment/Plan:    Multiple sclerosis ([facility] )  Patient overall doing very well  No new symptoms, no recent illness or hospitalizations  He remains on Rebif and tolerating well  He is due for updated labs at this time  Will also update MRI brain, last done in Nov 2016  He can cont Ampyra for his walking  Exam is stable today  His strength and gait actually seem a little better to me today  He reports he is having a good day  He will follow up in 4 months or sooner if needed  He will call for any new symptoms  Seizure disorder ([facility] )  No recent seizure activity  Cont Keppra 750mg BID       Diagnoses and all orders for this visit:    Multiple sclerosis ([facility] )  -     CBC and differential; Future  -     Comprehensive metabolic panel; Future  -     MRI brain MS wo and w contrast; Future    Seizure disorder ([facility])  -     levETIRAcetam (KEPPRA) 750 mg tablet; Take 1 tablet (750 mg total) by mouth 2 (two) times a day  -     Levetiracetam level; Future           Subjective:    HPI    Patient is a 70year old male with PMH of MS who presents today for neurology follow up  Patient has been on Novantrone and Avonex in the past for his MS  He was switched to Tecfidera at the end of 2013 and had to come off due to low absolute lymphs at 0 39  He has been off since March 2015  He is currently on Rebif, which was started in June 2015  He has tolerated the Rebif well  At visit in March 2016, he was following up from 5 hospitalizations from Oct 2015-Jan 2016  He had admits for pneumonia, c  diff, weakness  MRI brain 10/18/15 no new lesions, no acute ischemia  MRI c-spine Oct 2015 stable moderate chronic demyelinating disease in the c-cord and upper t-cord  There was also a question of seizure activity during one of his admits  His keppra had been increased to 1000mg BID, but then back down to 750mg BID, which is his normal dose   He denies any breakthrough seizure activity  MRI brain and c-spine done again in Nov 2015, both stable  He had updated EEG at Nov 2015 hospital stay  8hr video EEG slightly abnormal 8hr VEEG due to mild degree of frontal slowing  No clear epileptic seizures  He has remained on Ampyra  He has tried to stop it in the past, but walking got worse  He is aware of increased risk of seizures with Ampyra  He had updated MRI head done on Nov 16 2016 and this was also stable comp to one yr prior  On 10/20/17 he was hospitalized due to generalized weakness and fever, non-specific mild leukocytosis  He was started on empiric antibiotics, but cultures came back negative  He was seen by ID and no infectious cause of his fever  It was felt this possibly represented autonomic instability due to his MS  Neurology also saw him and did not feel he was having an MS exacerbation  MRI t-spine updated and stable  MRI lumbar spine also performed and this did demonstrate chronic compression abnormalities of T12, L1 as well as to a lesser degree L4 with a mild grade 1 anterolisthesis of L4 on L5 and a moderate dextroscoliosis thoracolumbar junction  Leukocytosis and fever resolved and returned to baseline and was discharged  Keppra level was stable at 22 0  LFTs normal  Renal function was stable  Today, patient reports he is overall doing well  He denies any new MS symptoms  He remains on Rebif and tolerating well  Last labs were done in Jan 2018 with normal CBC and LFTs  He also continues on [place]  Denies any seizure activity  Denies vision changes  No change in bowel or bladder, speech or swallowing  He continues with some dysarthria and dysphagia, but unchanged  He uses thick-it for his liquids  No recent illness or hospitalizations       The following portions of the patient's history were reviewed and updated as appropriate: current medications, past family history, past medical history, past social history, past surgical history and problem list          Objective:    Blood pressure 127/70, pulse 77, height 5' 6" (1 676 m), weight 64 8 kg (142 lb 12 8 oz)  Physical Exam   Constitutional: He appears well-developed and well-nourished  HENT:   Head: Normocephalic and atraumatic  Eyes: EOM are normal  Pupils are equal, round, and reactive to light  Cardiovascular: Intact distal pulses  Pulmonary/Chest: Effort normal    Neurological: He has normal reflexes  Coordination normal    Skin: Skin is warm and dry  Psychiatric: He has a normal mood and affect  Neurological Exam    Mental Status  The patient is alert and oriented to person, place, time, and situation  His recent and remote memory are normal  He has dysarthria of mild severity  His language is fluent with no aphasia  He has normal attention span and concentration  He has a normal fund of knowledge  Cranial Nerves    CN II: The patient's visual acuity and visual fields are normal   CN III, IV, VI: The patient's pupils are equally round and reactive to light and ocular movements are normal   CN V: The patient has normal facial sensation  CN VIII:  The patient's hearing is normal   CN IX, X: The patient has symmetric palate movement and normal gag reflex  CN XI: The patient's shoulder shrug strength is normal   CN XII: The patient's tongue is midline without atrophy or fasciculations  CN VII- facial asymmetry      Motor  The patient has normal muscle bulk throughout  His overall muscle tone is normal throughout                                                Right                   Left   Shoulder abduction                   5                         5  Elbow flexion                            5                         5  Elbow extension                       5                         5  Hip flexion                                4+                         5-  Dorsiflexion                               5-                         5  Mild tremor of outstretched arms Sensory  The patient's sensation is normal in all four extremities  Reflexes  Deep tendon reflexes are 2+ and symmetric in all four extremities with downgoing toes bilaterally  Gait and Coordination   He has a wide stance  He has normal coordination bilaterally  Using rollator to ambulate         ROS:    Review of Systems   Constitutional: Positive for fatigue  Negative for appetite change and fever  HENT: Negative  Negative for hearing loss, tinnitus, trouble swallowing and voice change  Eyes: Positive for visual disturbance  Negative for photophobia and pain  Respiratory: Negative  Negative for shortness of breath  Cardiovascular: Negative  Negative for palpitations  Gastrointestinal: Negative  Negative for nausea and vomiting  Endocrine: Negative  Negative for cold intolerance and heat intolerance  Genitourinary: Positive for frequency and urgency  Negative for dysuria  Musculoskeletal: Positive for back pain and neck pain  Negative for myalgias  Joint pain   Muscle pain     Skin: Negative  Negative for rash  Allergic/Immunologic: Positive for environmental allergies  Neurological: Positive for weakness and numbness  Negative for dizziness, tremors, seizures, syncope, facial asymmetry, speech difficulty, light-headedness and headaches  Pain while walking  Balance problems  Tingling     Hematological: Negative  Does not bruise/bleed easily  Psychiatric/Behavioral: Negative for confusion, hallucinations and sleep disturbance  The patient is nervous/anxious           Depression  Mood swings

## 2018-06-14 NOTE — ASSESSMENT & PLAN NOTE
Patient overall doing very well  No new symptoms, no recent illness or hospitalizations  He remains on Rebif and tolerating well  He is due for updated labs at this time  Will also update MRI brain, last done in Nov 2016  He can cont Ampyra for his walking  Exam is stable today  His strength and gait actually seem a little better to me today  He reports he is having a good day  He will follow up in 4 months or sooner if needed  He will call for any new symptoms

## 2018-06-15 ENCOUNTER — TELEPHONE (OUTPATIENT)
Dept: NEUROLOGY | Facility: CLINIC | Age: 63
End: 2018-06-15

## 2018-06-15 NOTE — TELEPHONE ENCOUNTER
Made patient's wife aware  States she will call to schedule an appointment with PCP  Routed labs to PCP     ----- Message from Rosemarie Butler PA-C sent at 6/15/2018  8:08 AM EDT -----  Pls let patient know kidney function is lower than normal   He also has some mild anemia  Needs to see PCP regarding these labs    Pls fax copy of labs to PCP and make sure patient makes appt with him to follow up on these labs

## 2018-06-17 LAB — LEVETIRACETAM SERPL-MCNC: 46.6 UG/ML (ref 10–40)

## 2018-06-19 ENCOUNTER — TELEPHONE (OUTPATIENT)
Dept: NEUROLOGY | Facility: CLINIC | Age: 63
End: 2018-06-19

## 2018-06-19 DIAGNOSIS — G40.909 SEIZURE DISORDER (HCC): Primary | ICD-10-CM

## 2018-06-19 RX ORDER — LEVETIRACETAM 500 MG/1
500 TABLET ORAL 2 TIMES DAILY
Qty: 60 TABLET | Refills: 5 | Status: SHIPPED | OUTPATIENT
Start: 2018-06-19 | End: 2018-12-01 | Stop reason: SDUPTHER

## 2018-06-19 NOTE — TELEPHONE ENCOUNTER
Notes recorded by Emily Valera PA-C on 6/19/2018 at 10:19 AM EDT  Called and spoke to pt's wife  Eddy Amaral level is double what it usually is  Gwen Romo due to decreased renal function (which he was already informed of and has appt with PCP this week)   Will decrease Keppra from 750mg BID to 500mg BID   Will send new Rx to [facility] have him repeat labs in 3-4 weeks   Orders will be sent home   Patient's wife verbalizes understanding      Ref Range & Units 6/14/18  2:53 PM Flag   Levetiracetam Lvl 10 0 - 40 0 ug/mL 46 6   H              Rx sent to pharm  Labs orders mailed home

## 2018-06-20 DIAGNOSIS — G35 MULTIPLE SCLEROSIS (HCC): Primary | ICD-10-CM

## 2018-06-25 ENCOUNTER — APPOINTMENT (OUTPATIENT)
Dept: LAB | Facility: MEDICAL CENTER | Age: 63
End: 2018-06-25
Payer: COMMERCIAL

## 2018-06-25 DIAGNOSIS — G40.909 SEIZURE DISORDER (HCC): ICD-10-CM

## 2018-06-25 LAB
ALBUMIN SERPL BCP-MCNC: 4.1 G/DL (ref 3.5–5)
ALP SERPL-CCNC: 89 U/L (ref 46–116)
ALT SERPL W P-5'-P-CCNC: 46 U/L (ref 12–78)
ANION GAP SERPL CALCULATED.3IONS-SCNC: 6 MMOL/L (ref 4–13)
AST SERPL W P-5'-P-CCNC: 33 U/L (ref 5–45)
BILIRUB SERPL-MCNC: 0.35 MG/DL (ref 0.2–1)
BUN SERPL-MCNC: 22 MG/DL (ref 5–25)
CALCIUM SERPL-MCNC: 9.7 MG/DL (ref 8.3–10.1)
CHLORIDE SERPL-SCNC: 102 MMOL/L (ref 100–108)
CO2 SERPL-SCNC: 28 MMOL/L (ref 21–32)
CREAT SERPL-MCNC: 1.25 MG/DL (ref 0.6–1.3)
GFR SERPL CREATININE-BSD FRML MDRD: 60 ML/MIN/1.73SQ M
GLUCOSE P FAST SERPL-MCNC: 83 MG/DL (ref 65–99)
POTASSIUM SERPL-SCNC: 4.3 MMOL/L (ref 3.5–5.3)
PROT SERPL-MCNC: 8.7 G/DL (ref 6.4–8.2)
SODIUM SERPL-SCNC: 136 MMOL/L (ref 136–145)

## 2018-06-25 PROCEDURE — 80053 COMPREHEN METABOLIC PANEL: CPT

## 2018-06-25 PROCEDURE — 80177 DRUG SCRN QUAN LEVETIRACETAM: CPT

## 2018-06-25 PROCEDURE — 36415 COLL VENOUS BLD VENIPUNCTURE: CPT

## 2018-06-27 LAB — LEVETIRACETAM SERPL-MCNC: 29.5 UG/ML (ref 10–40)

## 2018-06-28 ENCOUNTER — TELEPHONE (OUTPATIENT)
Dept: NEUROLOGY | Facility: CLINIC | Age: 63
End: 2018-06-28

## 2018-06-28 ENCOUNTER — HOSPITAL ENCOUNTER (OUTPATIENT)
Dept: MRI IMAGING | Facility: HOSPITAL | Age: 63
Discharge: HOME | End: 2018-06-28
Payer: COMMERCIAL

## 2018-06-28 DIAGNOSIS — G35 MULTIPLE SCLEROSIS (HCC): ICD-10-CM

## 2018-06-28 PROCEDURE — A9585 GADOBUTROL INJECTION: HCPCS | Performed by: PHYSICIAN ASSISTANT

## 2018-06-28 PROCEDURE — 70553 MRI BRAIN STEM W/O & W/DYE: CPT

## 2018-06-28 RX ORDER — INTERFERON BETA-1A 44 UG/.5ML
INJECTION, SOLUTION SUBCUTANEOUS
Qty: 6 ML | Refills: 10 | Status: SHIPPED | OUTPATIENT
Start: 2018-06-28

## 2018-06-28 RX ADMIN — GADOBUTROL 6 ML: 604.72 INJECTION INTRAVENOUS at 16:40

## 2018-06-28 NOTE — TELEPHONE ENCOUNTER
----- Message from Zoie Ramirez PA-C sent at 6/27/2018  2:29 PM EDT -----  Pls let patient and wife know that Keppra level and kidney function are improved  His Keppra level is back to his baseline  Will stay on present dose    Thanks

## 2018-07-20 DIAGNOSIS — G35 MULTIPLE SCLEROSIS (HCC): Primary | ICD-10-CM

## 2018-07-20 RX ORDER — CITALOPRAM 20 MG/1
TABLET ORAL
Qty: 180 TABLET | Refills: 2 | Status: SHIPPED | OUTPATIENT
Start: 2018-07-20

## 2018-08-21 DIAGNOSIS — G35 MULTIPLE SCLEROSIS (HCC): ICD-10-CM

## 2018-08-21 RX ORDER — DALFAMPRIDINE 10 MG/1
TABLET, FILM COATED, EXTENDED RELEASE ORAL
Qty: 60 TABLET | Refills: 4 | Status: SHIPPED | OUTPATIENT
Start: 2018-08-21 | End: 2019-01-21 | Stop reason: SDUPTHER

## 2018-08-31 DIAGNOSIS — G35 MULTIPLE SCLEROSIS (HCC): ICD-10-CM

## 2018-08-31 RX ORDER — CLONAZEPAM 0.5 MG/1
TABLET ORAL
Qty: 270 TABLET | Refills: 0 | Status: SHIPPED | OUTPATIENT
Start: 2018-08-31 | End: 2018-09-11 | Stop reason: HOSPADM

## 2018-09-05 ENCOUNTER — APPOINTMENT (EMERGENCY)
Dept: RADIOLOGY | Facility: HOSPITAL | Age: 63
End: 2018-09-05
Payer: COMMERCIAL

## 2018-09-05 ENCOUNTER — APPOINTMENT (EMERGENCY)
Dept: CT IMAGING | Facility: HOSPITAL | Age: 63
End: 2018-09-05
Payer: COMMERCIAL

## 2018-09-05 ENCOUNTER — HOSPITAL ENCOUNTER (OUTPATIENT)
Facility: HOSPITAL | Age: 63
Setting detail: OBSERVATION
Discharge: NURSING, REHAB OR LONG-TERM CARE | End: 2018-09-11
Attending: EMERGENCY MEDICINE | Admitting: FAMILY MEDICINE
Payer: COMMERCIAL

## 2018-09-05 DIAGNOSIS — S09.90XA INJURY OF HEAD, INITIAL ENCOUNTER: ICD-10-CM

## 2018-09-05 DIAGNOSIS — S16.1XXA ACUTE STRAIN OF NECK MUSCLE, INITIAL ENCOUNTER: ICD-10-CM

## 2018-09-05 DIAGNOSIS — G35 MULTIPLE SCLEROSIS (HCC): ICD-10-CM

## 2018-09-05 DIAGNOSIS — S00.01XA ABRASION OF SCALP, INITIAL ENCOUNTER: ICD-10-CM
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
## 2018-09-05 LAB
ALBUMIN SERPL BCP-MCNC: 4 G/DL (ref 3.5–5)
ALP SERPL-CCNC: 97 U/L (ref 46–116)
ALT SERPL W P-5'-P-CCNC: 43 U/L (ref 12–78)
ANION GAP BLD CALC-SCNC: 15 MMOL/L (ref 4–13)
ANION GAP SERPL CALCULATED.3IONS-SCNC: 8 MMOL/L (ref 4–13)
AST SERPL W P-5'-P-CCNC: 38 U/L (ref 5–45)
BASOPHILS # BLD AUTO: 0.04 THOUSANDS/ΜL (ref 0–0.1)
BASOPHILS NFR BLD AUTO: 0 % (ref 0–1)
BILIRUB SERPL-MCNC: 0.19 MG/DL (ref 0.2–1)
BUN BLD-MCNC: 34 MG/DL (ref 5–25)
BUN SERPL-MCNC: 28 MG/DL (ref 5–25)
CA-I BLD-SCNC: 1.2 MMOL/L (ref 1.12–1.32)
CALCIUM SERPL-MCNC: 9.9 MG/DL (ref 8.3–10.1)
CHLORIDE BLD-SCNC: 105 MMOL/L (ref 100–108)
CHLORIDE SERPL-SCNC: 105 MMOL/L (ref 100–108)
CO2 SERPL-SCNC: 27 MMOL/L (ref 21–32)
CREAT BLD-MCNC: 1.1 MG/DL (ref 0.6–1.3)
CREAT SERPL-MCNC: 1.22 MG/DL (ref 0.6–1.3)
EOSINOPHIL # BLD AUTO: 0.02 THOUSAND/ΜL (ref 0–0.61)
EOSINOPHIL NFR BLD AUTO: 0 % (ref 0–6)
ERYTHROCYTE [DISTWIDTH] IN BLOOD BY AUTOMATED COUNT: 12.2 % (ref 11.6–15.1)
GFR SERPL CREATININE-BSD FRML MDRD: 62 ML/MIN/1.73SQ M
GFR SERPL CREATININE-BSD FRML MDRD: 70 ML/MIN/1.73SQ M
GLUCOSE SERPL-MCNC: 154 MG/DL (ref 65–140)
GLUCOSE SERPL-MCNC: 158 MG/DL (ref 65–140)
HCT VFR BLD AUTO: 39.3 % (ref 36.5–49.3)
HCT VFR BLD CALC: 39 % (ref 36.5–49.3)
HGB BLD-MCNC: 12.7 G/DL (ref 12–17)
HGB BLDA-MCNC: 13.3 G/DL (ref 12–17)
IMM GRANULOCYTES # BLD AUTO: 0.03 THOUSAND/UL (ref 0–0.2)
IMM GRANULOCYTES NFR BLD AUTO: 0 % (ref 0–2)
LYMPHOCYTES # BLD AUTO: 0.49 THOUSANDS/ΜL (ref 0.6–4.47)
LYMPHOCYTES NFR BLD AUTO: 5 % (ref 14–44)
MCH RBC QN AUTO: 30.6 PG (ref 26.8–34.3)
MCHC RBC AUTO-ENTMCNC: 32.3 G/DL (ref 31.4–37.4)
MCV RBC AUTO: 95 FL (ref 82–98)
MONOCYTES # BLD AUTO: 0.6 THOUSAND/ΜL (ref 0.17–1.22)
MONOCYTES NFR BLD AUTO: 6 % (ref 4–12)
NEUTROPHILS # BLD AUTO: 9.12 THOUSANDS/ΜL (ref 1.85–7.62)
NEUTS SEG NFR BLD AUTO: 89 % (ref 43–75)
NRBC BLD AUTO-RTO: 0 /100 WBCS
PCO2 BLD: 29 MMOL/L (ref 21–32)
PLATELET # BLD AUTO: 243 THOUSANDS/UL (ref 149–390)
PMV BLD AUTO: 9.9 FL (ref 8.9–12.7)
POTASSIUM BLD-SCNC: 4.5 MMOL/L (ref 3.5–5.3)
POTASSIUM SERPL-SCNC: 4.2 MMOL/L (ref 3.5–5.3)
PROT SERPL-MCNC: 9 G/DL (ref 6.4–8.2)
RBC # BLD AUTO: 4.15 MILLION/UL (ref 3.88–5.62)
SODIUM BLD-SCNC: 143 MMOL/L (ref 136–145)
SODIUM SERPL-SCNC: 140 MMOL/L (ref 136–145)
SPECIMEN SOURCE: ABNORMAL
WBC # BLD AUTO: 10.3 THOUSAND/UL (ref 4.31–10.16)

## 2018-09-05 PROCEDURE — 85025 COMPLETE CBC W/AUTO DIFF WBC: CPT | Performed by: STUDENT IN AN ORGANIZED HEALTH CARE EDUCATION/TRAINING PROGRAM

## 2018-09-05 PROCEDURE — 96374 THER/PROPH/DIAG INJ IV PUSH: CPT

## 2018-09-05 PROCEDURE — 72125 CT NECK SPINE W/O DYE: CPT

## 2018-09-05 PROCEDURE — 80053 COMPREHEN METABOLIC PANEL: CPT | Performed by: STUDENT IN AN ORGANIZED HEALTH CARE EDUCATION/TRAINING PROGRAM

## 2018-09-05 PROCEDURE — 74177 CT ABD & PELVIS W/CONTRAST: CPT

## 2018-09-05 PROCEDURE — 71260 CT THORAX DX C+: CPT

## 2018-09-05 PROCEDURE — 70450 CT HEAD/BRAIN W/O DYE: CPT

## 2018-09-05 PROCEDURE — 36415 COLL VENOUS BLD VENIPUNCTURE: CPT | Performed by: STUDENT IN AN ORGANIZED HEALTH CARE EDUCATION/TRAINING PROGRAM

## 2018-09-05 PROCEDURE — 93005 ELECTROCARDIOGRAM TRACING: CPT

## 2018-09-05 PROCEDURE — 85014 HEMATOCRIT: CPT

## 2018-09-05 PROCEDURE — 80047 BASIC METABLC PNL IONIZED CA: CPT

## 2018-09-05 RX ORDER — LIDOCAINE 50 MG/G
1 PATCH TOPICAL ONCE
Status: COMPLETED | OUTPATIENT
Start: 2018-09-06 | End: 2018-09-06

## 2018-09-05 RX ORDER — BACLOFEN 20 MG/1
20 TABLET ORAL ONCE
Status: COMPLETED | OUTPATIENT
Start: 2018-09-06 | End: 2018-09-06

## 2018-09-05 RX ADMIN — HYDROMORPHONE HYDROCHLORIDE 1 MG: 1 INJECTION, SOLUTION INTRAMUSCULAR; INTRAVENOUS; SUBCUTANEOUS at 21:57

## 2018-09-05 RX ADMIN — IOHEXOL 100 ML: 350 INJECTION, SOLUTION INTRAVENOUS at 22:28

## 2018-09-06 PROBLEM — R91.1 LUNG NODULE < 6CM ON CT: Status: ACTIVE | Noted: 2018-09-06

## 2018-09-06 PROBLEM — S16.1XXA NECK STRAIN, INITIAL ENCOUNTER: Status: ACTIVE | Noted: 2018-09-06
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
## 2018-09-06 PROBLEM — R26.2 AMBULATORY DYSFUNCTION: Status: ACTIVE | Noted: 2018-09-06
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
## 2018-09-06 LAB
ANION GAP SERPL CALCULATED.3IONS-SCNC: 6 MMOL/L (ref 4–13)
ATRIAL RATE: 99 BPM
BASOPHILS # BLD AUTO: 0.03 THOUSANDS/ΜL (ref 0–0.1)
BASOPHILS NFR BLD AUTO: 0 % (ref 0–1)
BUN SERPL-MCNC: 25 MG/DL (ref 5–25)
CALCIUM SERPL-MCNC: 9.4 MG/DL (ref 8.3–10.1)
CHLORIDE SERPL-SCNC: 105 MMOL/L (ref 100–108)
CO2 SERPL-SCNC: 30 MMOL/L (ref 21–32)
CREAT SERPL-MCNC: 1.24 MG/DL (ref 0.6–1.3)
EOSINOPHIL # BLD AUTO: 0 THOUSAND/ΜL (ref 0–0.61)
EOSINOPHIL NFR BLD AUTO: 0 % (ref 0–6)
ERYTHROCYTE [DISTWIDTH] IN BLOOD BY AUTOMATED COUNT: 12.2 % (ref 11.6–15.1)
GFR SERPL CREATININE-BSD FRML MDRD: 61 ML/MIN/1.73SQ M
GLUCOSE P FAST SERPL-MCNC: 108 MG/DL (ref 65–99)
GLUCOSE SERPL-MCNC: 108 MG/DL (ref 65–140)
HCT VFR BLD AUTO: 35.4 % (ref 36.5–49.3)
HGB BLD-MCNC: 11.5 G/DL (ref 12–17)
IMM GRANULOCYTES # BLD AUTO: 0.02 THOUSAND/UL (ref 0–0.2)
IMM GRANULOCYTES NFR BLD AUTO: 0 % (ref 0–2)
LYMPHOCYTES # BLD AUTO: 0.88 THOUSANDS/ΜL (ref 0.6–4.47)
LYMPHOCYTES NFR BLD AUTO: 13 % (ref 14–44)
MCH RBC QN AUTO: 31 PG (ref 26.8–34.3)
MCHC RBC AUTO-ENTMCNC: 32.5 G/DL (ref 31.4–37.4)
MCV RBC AUTO: 95 FL (ref 82–98)
MONOCYTES # BLD AUTO: 0.6 THOUSAND/ΜL (ref 0.17–1.22)
MONOCYTES NFR BLD AUTO: 9 % (ref 4–12)
NEUTROPHILS # BLD AUTO: 5.5 THOUSANDS/ΜL (ref 1.85–7.62)
NEUTS SEG NFR BLD AUTO: 78 % (ref 43–75)
NRBC BLD AUTO-RTO: 0 /100 WBCS
P AXIS: 73 DEGREES
PLATELET # BLD AUTO: 214 THOUSANDS/UL (ref 149–390)
PMV BLD AUTO: 10 FL (ref 8.9–12.7)
POTASSIUM SERPL-SCNC: 4.4 MMOL/L (ref 3.5–5.3)
PR INTERVAL: 168 MS
QRS AXIS: 73 DEGREES
QRSD INTERVAL: 86 MS
QT INTERVAL: 314 MS
QTC INTERVAL: 402 MS
RBC # BLD AUTO: 3.71 MILLION/UL (ref 3.88–5.62)
SODIUM SERPL-SCNC: 141 MMOL/L (ref 136–145)
T WAVE AXIS: 65 DEGREES
VENTRICULAR RATE: 99 BPM
WBC # BLD AUTO: 7.03 THOUSAND/UL (ref 4.31–10.16)

## 2018-09-06 PROCEDURE — 80048 BASIC METABOLIC PNL TOTAL CA: CPT | Performed by: NURSE PRACTITIONER

## 2018-09-06 PROCEDURE — 51798 US URINE CAPACITY MEASURE: CPT

## 2018-09-06 PROCEDURE — 99220 PR INITIAL OBSERVATION CARE/DAY 70 MINUTES: CPT | Performed by: INTERNAL MEDICINE

## 2018-09-06 PROCEDURE — 85025 COMPLETE CBC W/AUTO DIFF WBC: CPT | Performed by: NURSE PRACTITIONER

## 2018-09-06 PROCEDURE — 99285 EMERGENCY DEPT VISIT HI MDM: CPT

## 2018-09-06 PROCEDURE — 93010 ELECTROCARDIOGRAM REPORT: CPT | Performed by: INTERNAL MEDICINE

## 2018-09-06 PROCEDURE — 99204 OFFICE O/P NEW MOD 45 MIN: CPT | Performed by: NURSE PRACTITIONER

## 2018-09-06 RX ORDER — LIDOCAINE 50 MG/G
1 PATCH TOPICAL EVERY 24 HOURS
Qty: 30 PATCH | Refills: 0 | Status: SHIPPED | OUTPATIENT
Start: 2018-09-06 | End: 2019-01-21

## 2018-09-06 RX ORDER — CITALOPRAM 20 MG/1
40 TABLET ORAL DAILY
Status: DISCONTINUED | OUTPATIENT
Start: 2018-09-06 | End: 2018-09-11 | Stop reason: HOSPADM

## 2018-09-06 RX ORDER — B-COMPLEX WITH VITAMIN C
1 TABLET ORAL
Status: DISCONTINUED | OUTPATIENT
Start: 2018-09-06 | End: 2018-09-08

## 2018-09-06 RX ORDER — BACLOFEN 10 MG/1
20 TABLET ORAL 3 TIMES DAILY PRN
Status: DISCONTINUED | OUTPATIENT
Start: 2018-09-06 | End: 2018-09-07

## 2018-09-06 RX ORDER — LEVETIRACETAM 500 MG/1
500 TABLET ORAL 2 TIMES DAILY
Status: DISCONTINUED | OUTPATIENT
Start: 2018-09-06 | End: 2018-09-11 | Stop reason: HOSPADM

## 2018-09-06 RX ORDER — LIDOCAINE 50 MG/G
1 PATCH TOPICAL DAILY
Status: DISCONTINUED | OUTPATIENT
Start: 2018-09-06 | End: 2018-09-11 | Stop reason: HOSPADM

## 2018-09-06 RX ORDER — DALFAMPRIDINE 10 MG/1
1 TABLET, FILM COATED, EXTENDED RELEASE ORAL EVERY 12 HOURS
Status: DISCONTINUED | OUTPATIENT
Start: 2018-09-06 | End: 2018-09-11 | Stop reason: HOSPADM

## 2018-09-06 RX ORDER — SACCHAROMYCES BOULARDII 250 MG
250 CAPSULE ORAL 2 TIMES DAILY
Status: DISCONTINUED | OUTPATIENT
Start: 2018-09-06 | End: 2018-09-11 | Stop reason: HOSPADM

## 2018-09-06 RX ORDER — ACETAMINOPHEN 325 MG/1
650 TABLET ORAL EVERY 6 HOURS PRN
Status: DISCONTINUED | OUTPATIENT
Start: 2018-09-06 | End: 2018-09-11 | Stop reason: HOSPADM

## 2018-09-06 RX ORDER — CELECOXIB 200 MG/1
200 CAPSULE ORAL 2 TIMES DAILY
Status: DISCONTINUED | OUTPATIENT
Start: 2018-09-06 | End: 2018-09-11 | Stop reason: HOSPADM

## 2018-09-06 RX ORDER — TIZANIDINE 4 MG/1
4 TABLET ORAL DAILY PRN
Status: DISCONTINUED | OUTPATIENT
Start: 2018-09-06 | End: 2018-09-07

## 2018-09-06 RX ORDER — OXYCODONE HCL 20 MG/1
20 TABLET, FILM COATED, EXTENDED RELEASE ORAL 2 TIMES DAILY
Status: DISCONTINUED | OUTPATIENT
Start: 2018-09-06 | End: 2018-09-11 | Stop reason: HOSPADM

## 2018-09-06 RX ORDER — GABAPENTIN 300 MG/1
300 CAPSULE ORAL
Status: DISCONTINUED | OUTPATIENT
Start: 2018-09-06 | End: 2018-09-11 | Stop reason: HOSPADM

## 2018-09-06 RX ORDER — TIZANIDINE 4 MG/1
4 TABLET ORAL
Status: DISCONTINUED | OUTPATIENT
Start: 2018-09-06 | End: 2018-09-06

## 2018-09-06 RX ADMIN — CITALOPRAM HYDROBROMIDE 40 MG: 20 TABLET ORAL at 08:17

## 2018-09-06 RX ADMIN — LIDOCAINE 1 PATCH: 50 PATCH TOPICAL at 00:08

## 2018-09-06 RX ADMIN — CELECOXIB 200 MG: 200 CAPSULE ORAL at 08:17

## 2018-09-06 RX ADMIN — OXYCODONE HYDROCHLORIDE 20 MG: 20 TABLET, FILM COATED, EXTENDED RELEASE ORAL at 20:39

## 2018-09-06 RX ADMIN — CLONAZEPAM 1.5 MG: 1 TABLET ORAL at 20:58

## 2018-09-06 RX ADMIN — Medication 1 TABLET: at 08:17

## 2018-09-06 RX ADMIN — TIZANIDINE 4 MG: 4 TABLET ORAL at 17:14

## 2018-09-06 RX ADMIN — DALFAMPRIDINE 10 MG: 10 TABLET, FILM COATED, EXTENDED RELEASE ORAL at 23:30

## 2018-09-06 RX ADMIN — LEVETIRACETAM 500 MG: 500 TABLET ORAL at 17:05

## 2018-09-06 RX ADMIN — CELECOXIB 200 MG: 200 CAPSULE ORAL at 17:05

## 2018-09-06 RX ADMIN — ACETAMINOPHEN 650 MG: 325 TABLET, FILM COATED ORAL at 14:27

## 2018-09-06 RX ADMIN — CLONAZEPAM 1.5 MG: 1 TABLET ORAL at 03:10

## 2018-09-06 RX ADMIN — OXYCODONE HYDROCHLORIDE 20 MG: 20 TABLET, FILM COATED, EXTENDED RELEASE ORAL at 03:10

## 2018-09-06 RX ADMIN — TIZANIDINE 4 MG: 4 TABLET ORAL at 03:40

## 2018-09-06 RX ADMIN — CALCIUM CARBONATE 500 MG (1,250 MG)-VITAMIN D3 200 UNIT TABLET 1 TABLET: at 08:17

## 2018-09-06 RX ADMIN — BACLOFEN 20 MG: 10 TABLET ORAL at 19:38

## 2018-09-06 RX ADMIN — OXYCODONE HYDROCHLORIDE 20 MG: 20 TABLET, FILM COATED, EXTENDED RELEASE ORAL at 08:17

## 2018-09-06 RX ADMIN — Medication 250 MG: at 08:17

## 2018-09-06 RX ADMIN — BACLOFEN 20 MG: 20 TABLET ORAL at 00:08

## 2018-09-06 RX ADMIN — Medication 250 MG: at 17:05

## 2018-09-06 RX ADMIN — GABAPENTIN 300 MG: 300 CAPSULE ORAL at 20:58

## 2018-09-06 RX ADMIN — LEVETIRACETAM 500 MG: 500 TABLET ORAL at 08:17

## 2018-09-06 NOTE — ED NOTES
Pt was dressed and IV removed for pt to be discharged  Pt wife stated that pt is took weak to go home; pt wife stated " I can't take care of him anymore I just don't know what to do"   Pt returned to bed at this time     Conrado Tena RN  09/06/18 4490

## 2018-09-06 NOTE — DISCHARGE INSTRUCTIONS
Cervical Strain   WHAT YOU NEED TO KNOW:   A cervical strain is a stretched or torn muscle or tendon in your neck  Tendons are strong tissues that connect muscles to bones  Common causes of cervical strains include a car accident, a fall, or a sports injury  DISCHARGE INSTRUCTIONS:   Return to the emergency department if:   · You have pain or numbness from your shoulder down to your hand  · You have problems with your vision, hearing, or balance  · You feel confused or cannot concentrate  · You have problems with movement and strength  Contact your healthcare provider if:   · You have increased swelling or pain in your neck  · You have questions or concerns about your condition or care  Medicines: You may need any of the following:  · Acetaminophen  decreases pain and fever  It is available without a doctor's order  Ask how much to take and how often to take it  Follow directions  Read the labels of all other medicines you are using to see if they also contain acetaminophen, or ask your doctor or pharmacist  Acetaminophen can cause liver damage if not taken correctly  Do not use more than 4 grams (4,000 milligrams) total of acetaminophen in one day  · NSAIDs , such as ibuprofen, help decrease swelling, pain, and fever  This medicine is available with or without a doctor's order  NSAIDs can cause stomach bleeding or kidney problems in certain people  If you take blood thinner medicine, always ask your healthcare provider if NSAIDs are safe for you  Always read the medicine label and follow directions  · Muscle relaxers  help decrease pain and muscle spasms  · Prescription pain medicine  may be given  Ask your healthcare provider how to take this medicine safely  Some prescription pain medicines contain acetaminophen  Do not take other medicines that contain acetaminophen without talking to your healthcare provider  Too much acetaminophen may cause liver damage   Prescription pain medicine may cause constipation  Ask your healthcare provider how to prevent or treat constipation  · Take your medicine as directed  Contact your healthcare provider if you think your medicine is not helping or if you have side effects  Tell him or her if you are allergic to any medicine  Keep a list of the medicines, vitamins, and herbs you take  Include the amounts, and when and why you take them  Bring the list or the pill bottles to follow-up visits  Carry your medicine list with you in case of an emergency  Manage your symptoms:   · Apply heat  on your neck for 15 to 20 minutes, 4 to 6 times a day or as directed  Heat helps decrease pain, stiffness, and muscle spasms  · Begin gentle neck exercises  as soon as you can move your neck without pain  Exercises will help decrease stiffness and improve the strength and movement of your neck  Ask your healthcare provider what kind of exercises you should do  · Gradually return to your usual activities as directed  Stop if you have pain  Avoid activities that can cause more damage to your neck, such as heavy lifting or strenuous exercise  · Sleep without a pillow  to help decrease pain  Instead, roll a small towel tightly and place it under your neck  · Go to physical therapy as directed  A physical therapist teaches you exercises to help improve movement and strength, and to decrease pain  Prevent neck injury:   · Drive safely  Make sure everyone in your car wears a seatbelt  A seatbelt can save your life if you are in an accident  Do not use your cell phone when you are driving  This could distract you and cause an accident  Pull over if you need to make a call or send a text message  · Wear helmets, lifejackets, and protective gear  Always wear a helmet when you ride a bike or motorcycle, go skiing, or play sports that could cause a head injury  Wear protective equipment when you play sports   Wear a lifejacket when you are on a boat or doing water sports  Follow up with your healthcare provider as directed: You may be referred to an orthopedist or physical therapies  Write down your questions so you remember to ask them during your visits  © 2017 [employer] Information is for End User's use only and may not be sold, redistributed or otherwise used for commercial purposes  All illustrations and images included in CareNotes® are the copyrighted property of [employer]  or [employer] Bonilla  The above information is an  only  It is not intended as medical advice for individual conditions or treatments  Talk to your doctor, nurse or pharmacist before following any medical regimen to see if it is safe and effective for you  Abrasion   WHAT YOU NEED TO KNOW:   An abrasion is a scrape on your skin  It happens when your skin rubs against a rough surface  Some examples of an abrasion include rug burn, a skinned elbow, or road rash  Abrasions can be many shapes and sizes  The wound may hurt, bleed, bruise, or swell  DISCHARGE INSTRUCTIONS:   Return to the emergency department if:   · The bleeding does not stop after 10 minutes of firm pressure  · You cannot rinse one or more foreign objects out of your wound  · You have red streaks on your skin coming from your wound  Contact your healthcare provider if:   · You have a fever or chills  · Your abrasion is red, warm, swollen, or draining pus  · You have questions or concerns about your condition or care  Care for your abrasion:   · Wash your hands and dry them with a clean towel  · Press a clean cloth against your wound to stop any bleeding  · Rinse your wound with a lot of clean water  Do not use harsh soap, alcohol, or iodine solutions  · Use a clean, wet cloth to remove any objects, such as small pieces of rocks or dirt  · Rub antibiotic ointment on your wound   This may help prevent infection and help your wound heal     · Cover the wound with a non-stick bandage  Change the bandage daily, and if gets wet or dirty  Follow up with your healthcare provider as directed:  Write down your questions so you remember to ask them during your visits  © 2017 [employer] Information is for End User's use only and may not be sold, redistributed or otherwise used for commercial purposes  All illustrations and images included in CareNotes® are the copyrighted property of [employer]  or Marty Bonilla  The above information is an  only  It is not intended as medical advice for individual conditions or treatments  Talk to your doctor, nurse or pharmacist before following any medical regimen to see if it is safe and effective for you  Head Injury   WHAT YOU NEED TO KNOW:   A head injury is most often caused by a blow to the head  This may occur from a fall, bicycle injury, sports injury, being struck in the head, or a motor vehicle accident  DISCHARGE INSTRUCTIONS:   Call 911 or have someone else call for any of the following:   · You cannot be woken  · You have a seizure  · You stop responding to others or you faint  · You have blurry or double vision  · Your speech becomes slurred or confused  · You have arm or leg weakness, loss of feeling, or new problems with coordination  · Your pupils are larger than usual or one pupil is a different size than the other  · You have blood or clear fluid coming out of your ears or nose  Seek care immediately if:   · You have repeated or forceful vomiting  · You feel confused  · Your headache gets worse or becomes severe  · You or someone caring for you notices that you are harder to wake than usual   Contact your healthcare provider if:   · Your symptoms last longer than 6 weeks after the injury  · You have questions or concerns about your condition or care  Medicines:   · Acetaminophen  decreases pain   Acetaminophen is available without a doctor's order  Ask how much to take and how often to take it  Follow directions  Acetaminophen can cause liver damage if not taken correctly  · Take your medicine as directed  Contact your healthcare provider if you think your medicine is not helping or if you have side effects  Tell him or her if you are allergic to any medicine  Keep a list of the medicines, vitamins, and herbs you take  Include the amounts, and when and why you take them  Bring the list or the pill bottles to follow-up visits  Carry your medicine list with you in case of an emergency  Self-care:   · Rest  or do quiet activities for 24 to 48 hours  Limit your time watching TV, using the computer, or doing tasks that require a lot of thinking  Slowly return to your normal activities as directed  Do not play sports or do activities that may cause you to get hit in the head  Ask your healthcare provider when you can return to sports  · Apply ice  on your head for 15 to 20 minutes every hour or as directed  Use an ice pack, or put crushed ice in a plastic bag  Cover it with a towel before you apply it to your skin  Ice helps prevent tissue damage and decreases swelling and pain  · Have someone stay with you for 24 hours  or as directed  This person can monitor you for complications and call [phone]  When you are awake the person should ask you a few questions to see if you are thinking clearly  An example would be to ask your name or your address  Prevent another head injury:   · Wear a helmet that fits properly  Do this when you play sports, or ride a bike, scooter, or skateboard  Helmets help decrease your risk of a serious head injury  Talk to your healthcare provider about other ways you can protect yourself if you play sports  · Wear your seat belt every time you are in a car  This helps to decrease your risk for a head injury if you are in a car accident    Follow up with your healthcare provider as directed:  Write down your questions so you remember to ask them during your visits  © 2017 [employer] Information is for End User's use only and may not be sold, redistributed or otherwise used for commercial purposes  All illustrations and images included in CareNotes® are the copyrighted property of [employer]  or Marty Bonilla  The above information is an  only  It is not intended as medical advice for individual conditions or treatments  Talk to your doctor, nurse or pharmacist before following any medical regimen to see if it is safe and effective for you  Cervical Strain   WHAT YOU NEED TO KNOW:   A cervical strain is a stretched or torn muscle or tendon in your neck  Tendons are strong tissues that connect muscles to bones  Common causes of cervical strains include a car accident, a fall, or a sports injury  DISCHARGE INSTRUCTIONS:   Seek care immediately if:   · You have pain or numbness from your shoulder down to your hand  · You have problems with your vision, hearing, or balance  · You feel confused or cannot concentrate  · You have problems with movement and strength  Contact your healthcare provider if:   · You have increased swelling or pain in your neck  · You have questions or concerns about your condition or care  Medicines: You may need any of the following:  · Acetaminophen  decreases pain and fever  It is available without a doctor's order  Ask how much to take and how often to take it  Follow directions  Read the labels of all other medicines you are using to see if they also contain acetaminophen, or ask your doctor or pharmacist  Acetaminophen can cause liver damage if not taken correctly  Do not use more than 4 grams (4,000 milligrams) total of acetaminophen in one day  · NSAIDs , such as ibuprofen, help decrease swelling, pain, and fever  This medicine is available with or without a doctor's order   NSAIDs can cause stomach bleeding or kidney problems in certain people  If you take blood thinner medicine, always ask your healthcare provider if NSAIDs are safe for you  Always read the medicine label and follow directions  · Muscle relaxers  help decrease pain and muscle spasms  · Prescription pain medicine  may be given  Ask your healthcare provider how to take this medicine safely  Some prescription pain medicines contain acetaminophen  Do not take other medicines that contain acetaminophen without talking to your healthcare provider  Too much acetaminophen may cause liver damage  Prescription pain medicine may cause constipation  Ask your healthcare provider how to prevent or treat constipation  · Take your medicine as directed  Contact your healthcare provider if you think your medicine is not helping or if you have side effects  Tell him or her if you are allergic to any medicine  Keep a list of the medicines, vitamins, and herbs you take  Include the amounts, and when and why you take them  Bring the list or the pill bottles to follow-up visits  Carry your medicine list with you in case of an emergency  Manage your symptoms:   · Apply heat  on your neck for 15 to 20 minutes, 4 to 6 times a day or as directed  Heat helps decrease pain, stiffness, and muscle spasms  · Begin gentle neck exercises  as soon as you can move your neck without pain  Exercises will help decrease stiffness and improve the strength and movement of your neck  Ask your healthcare provider what kind of exercises you should do  · Gradually return to your usual activities as directed  Stop if you have pain  Avoid activities that can cause more damage to your neck, such as heavy lifting or strenuous exercise  · Sleep without a pillow  to help decrease pain  Instead, roll a small towel tightly and place it under your neck  · Go to physical therapy as directed    A physical therapist teaches you exercises to help improve movement and strength, and to decrease pain   Prevent neck injury:   · Drive safely  Make sure everyone in your car wears a seatbelt  A seatbelt can save your life if you are in an accident  Do not use your cell phone when you are driving  This could distract you and cause an accident  Pull over if you need to make a call or send a text message  · Wear helmets, lifejackets, and protective gear  Always wear a helmet when you ride a bike or motorcycle, go skiing, or play sports that could cause a head injury  Wear protective equipment when you play sports  Wear a lifejacket when you are on a boat or doing water sports  Follow up with your healthcare provider as directed: You may be referred to an orthopedist or physical therapies  Write down your questions so you remember to ask them during your visits  © 2017 [address] [employer]  Information is for End User's use only and may not be sold, redistributed or otherwise used for commercial purposes  All illustrations and images included in CareNotes® are the copyrighted property of [employer]  or Marty Bonilla  The above information is an  only  It is not intended as medical advice for individual conditions or treatments  Talk to your doctor, nurse or pharmacist before following any medical regimen to see if it is safe and effective for you  Fall Prevention   WHAT YOU NEED TO KNOW:   Fall prevention includes ways to make your home and other areas safer  It also includes ways you can move more carefully to prevent a fall  Health conditions that cause changes in your blood pressure, vision, or muscle strength and coordination may increase your risk for falls  Medicines may also increase your risk for falls if they make you dizzy, weak, or sleepy  DISCHARGE INSTRUCTIONS:   Call 911 or have someone else call if:   · You have fallen and are unconscious  · You have fallen and cannot move part of your body    Contact your healthcare provider if:   · You have fallen and have pain or a headache  · You have questions or concerns about your condition or care  Fall prevention tips:   · Stand or sit up slowly  This may help you keep your balance and prevent falls  · Use assistive devices as directed  Your healthcare provider may suggest that you use a cane or walker to help you keep your balance  You may need to have grab bars put in your bathroom near the toilet or in the shower  · Wear shoes that fit well and have soles that   Wear shoes both inside and outside  Use slippers with good   Do not wear shoes with high heels  · Wear a personal alarm  This is a device that allows you to call 911 if you fall and need help  Ask your healthcare provider for more information  · Stay active  Exercise can help strengthen your muscles and improve your balance  Your healthcare provider may recommend water aerobics or walking  He or she may also recommend physical therapy to improve your coordination  Never start an exercise program without talking to your healthcare provider first      · Manage your medical conditions  Keep all appointments with your healthcare providers  Visit your eye doctor as directed  Home safety tips:   · Add items to prevent falls in the bathroom  Put nonslip strips on your bath or shower floor to prevent you from slipping  Use a bath mat if you do not have carpet in the bathroom  This will prevent you from falling when you step out of the bath or shower  Use a shower seat so you do not need to stand while you shower  Sit on the toilet or a chair in your bathroom to dry yourself and put on clothing  This will prevent you from losing your balance from drying or dressing yourself while you are standing  · Keep paths clear  Remove books, shoes, and other objects from walkways and stairs  Place cords for telephones and lamps out of the way so that you do not need to walk over them  Tape them down if you cannot move them  Remove small rugs  If you cannot remove a rug, secure it with double-sided tape  This will prevent you from tripping  · Install bright lights in your home  Use night lights to help light paths to the bathroom or kitchen  Always turn on the light before you start walking  · Keep items you use often on shelves within reach  Do not use a step stool to help you reach an item  · Paint or place reflective tape on the edges of your stairs  This will help you see the stairs better  Follow up with your healthcare provider as directed:  Write down your questions so you remember to ask them during your visits  © 2017 [address] [employer] Information is for End User's use only and may not be sold, redistributed or otherwise used for commercial purposes  All illustrations and images included in CareNotes® are the copyrighted property of [employer]  or Marty Bonilla  The above information is an  only  It is not intended as medical advice for individual conditions or treatments  Talk to your doctor, nurse or pharmacist before following any medical regimen to see if it is safe and effective for you

## 2018-09-06 NOTE — PLAN OF CARE
Problem: Potential for Falls  Goal: Patient will remain free of falls  INTERVENTIONS:  - Assess patient frequently for physical needs  -  Identify cognitive and physical deficits and behaviors that affect risk of falls    -  Sharon fall precautions as indicated by assessment   - Educate patient/family on patient safety including physical limitations  - Instruct patient to call for assistance with activity based on assessment  - Modify environment to reduce risk of injury  - Consider OT/PT consult to assist with strengthening/mobility   Outcome: Progressing      Problem: Prexisting or High Potential for Compromised Skin Integrity  Goal: Skin integrity is maintained or improved  INTERVENTIONS:  - Identify patients at risk for skin breakdown  - Assess and monitor skin integrity  - Assess and monitor nutrition and hydration status  - Monitor labs (i e  albumin)  - Assess for incontinence   - Turn and reposition patient  - Assist with mobility/ambulation  - Relieve pressure over bony prominences  - Avoid friction and shearing  - Provide appropriate hygiene as needed including keeping skin clean and dry  - Evaluate need for skin moisturizer/barrier cream  - Collaborate with interdisciplinary team (i e  Nutrition, Rehabilitation, etc )   - Patient/family teaching   Outcome: Progressing      Problem: PAIN - ADULT  Goal: Verbalizes/displays adequate comfort level or baseline comfort level  Interventions:  - Encourage patient to monitor pain and request assistance  - Assess pain using appropriate pain scale  - Administer analgesics based on type and severity of pain and evaluate response  - Implement non-pharmacological measures as appropriate and evaluate response  - Consider cultural and social influences on pain and pain management  - Notify physician/advanced practitioner if interventions unsuccessful or patient reports new pain  Outcome: Progressing      Problem: DISCHARGE PLANNING  Goal: Discharge to home or other facility with appropriate resources  INTERVENTIONS:  - Identify barriers to discharge w/patient and caregiver  - Arrange for needed discharge resources and transportation as appropriate  - Identify discharge learning needs (meds, wound care, etc )  - Arrange for interpretive services to assist at discharge as needed  - Refer to Case Management Department for coordinating discharge planning if the patient needs post-hospital services based on physician/advanced practitioner order or complex needs related to functional status, cognitive ability, or social support system  Outcome: Progressing      Problem: MUSCULOSKELETAL - ADULT  Goal: Maintain or return mobility to safest level of function  INTERVENTIONS:  - Assess patient's ability to carry out ADLs; assess patient's baseline for ADL function and identify physical deficits which impact ability to perform ADLs (bathing, care of mouth/teeth, toileting, grooming, dressing, etc )  - Assess/evaluate cause of self-care deficits   - Assess range of motion  - Assess patient's mobility; develop plan if impaired  - Assess patient's need for assistive devices and provide as appropriate  - Encourage maximum independence but intervene and supervise when necessary  - Involve family in performance of ADLs  - Assess for home care needs following discharge   - Request OT consult to assist with ADL evaluation and planning for discharge  - Provide patient education as appropriate  Outcome: Progressing

## 2018-09-06 NOTE — SOCIAL WORK
CM received a consult regarding discharge planning  CM met with patient to complete a general SW assessment and discuss discharge planning  Patient resides in a multilevel home with his wife; no difficulty with steps reported  Patient identifies his wife as their primary support system  Patient is independent with ADLs and functional mobility  Patient reports using a cane as needed and a rollator; declined needing any additional DMEs at discharge  Patient drives; reports his wife is available to transport at discharge  Patient uses [facility] on [address]  [facility] [address] [facility] at discharge  Patient has a history with [facility], however does not remember the agency at this time  PCP identified as Dr Anastacio Guido  POA identified as his wife  No additional needs at present  CM to follow as needed

## 2018-09-06 NOTE — ED PROVIDER NOTES
History  Chief Complaint   Patient presents with    Fall     patient reports fall down 13 hardwood steps earlier today, wife called EMS, patient refused EMS transport to hospital   patient now c/o pain to left shoulder and left upper and middle back pain  reports left sided neck pain  no c-spine tenderness upon palpation  reports hitting head, no loc   small lacerations noted to head, patient reports carpet tacking was on the floor which caused the lacerations  no thinners reported  This is a 69 yo male with a PMHx of secondary progressive MS who presents to the ED after a fall down 6-7 steps  Patient states that he fell around 1730 this evening  He did hit his head but denies any LOC  911 was called but the patient refused transport because he felt fine  However, sometime after he refused, the patient was lying in bed and states that he was incontinent to stool and decided to come into the ED for further eval  Patient states that the majority of his pain at this time is in his bilateral cervical paraspinal muscles and his left shoulder  Patient denies any headaches or change in vision at this time and no CP/SOB, NVD, dysuria, hematuria, urinary retention, or saddle anesthesia  Prior to Admission Medications   Prescriptions Last Dose Informant Patient Reported? Taking? AMPYRA 10 MG TB12   No No   Sig: TAKE ONE TABLET (10 MG) BY MOUTH TWICE DAILY (APPROXIMATELY 12 HOURS APART)  MAY BE TAKEN WITH OR WITHOUT FOOD  DO NOT CUT OR CRUSH   STORE A   Calcium Citrate-Vitamin D (CALCIUM + D PO)  Self Yes No   Sig: Take by mouth   HYDROcodone-acetaminophen (NORCO) 5-325 mg per tablet  Self Yes No   Sig: Take 1 tablet by mouth every 6 (six) hours as needed for pain   Lactobacillus (ACIDOPHILUS PROBIOTIC) TABS  Self Yes No   Sig: Take by mouth   Multiple Vitamin (MULTIVITAMIN) tablet  Self Yes No   Sig: Take 1 tablet by mouth daily   OxyCODONE HCl (OXYCONTIN PO)  Self Yes No   Sig: Take 20 mg by mouth 2 (two) times a day     REBIF REBIDOSE 44 MCG/0 5ML SOAJ   No No   Sig: INJECT 44 MCG (0 5ML) UNDER THE SKIN 3 TIMES PER WEEK   TiZANidine (ZANAFLEX) 4 MG capsule  Self Yes No   Sig: Take 4 mg by mouth 3 (three) times a day   b complex vitamins tablet  Self Yes No   Sig: Take 1 tablet by mouth daily  baclofen 20 mg tablet  Self No No   Sig: TAKE 1 TABLET THREE TIMES A DAY AS NEEDED   celecoxib (CeleBREX) 200 mg capsule  Self Yes No   Sig: Take 200 mg by mouth   citalopram (CeleXA) 20 mg tablet   No No   Sig: TAKE 2 TABLETS EVERY DAY   clonazePAM (KlonoPIN) 0 5 mg tablet   No No   Sig: TAKE 3 TABS AT BEDTIME   gabapentin (NEURONTIN) 300 mg capsule  Self No No   Sig: TAKE 1 CAPSULE BEDTIME   levETIRAcetam (KEPPRA) 500 mg tablet   No No   Sig: Take 1 tablet (500 mg total) by mouth 2 (two) times a day      Facility-Administered Medications: None       Past Medical History:   Diagnosis Date    [facility] 1/23/2016    Arthritis     lower back    Depression     [facility] sclerosis ([facility] )     Psychiatric disorder     depression    Seizures (HCC)     pseudoseizures       History reviewed  No pertinent surgical history  Family History   Problem Relation Age of Onset    Heart disease Father      I have reviewed and agree with the history as documented  Social History   Substance Use Topics    Smoking status: Current Every Day Smoker     Packs/day: 1 00     Types: E-Cigarettes     Last attempt to quit: 1/20/2012    Smokeless tobacco: Never Used      Comment: Pt states he quit smoking by using electronic cigarettes  Now does not smoke anything    Alcohol use No        Review of Systems   Constitutional: Negative for chills, diaphoresis and fever  HENT: Negative for congestion, rhinorrhea, sinus pressure and sore throat  Eyes: Negative for visual disturbance  Respiratory: Negative for cough, chest tightness and shortness of breath  Cardiovascular: Negative for chest pain  Gastrointestinal: Negative for abdominal pain, constipation, diarrhea, nausea and vomiting  Genitourinary: Negative for dysuria, frequency, hematuria and urgency  Musculoskeletal: Positive for arthralgias and neck pain  Negative for myalgias  Skin: Negative for color change and rash  Neurological: Negative for dizziness, numbness and headaches  Physical Exam  ED Triage Vitals [09/05/18 2054]   Temperature Pulse Respirations Blood Pressure SpO2   99 8 °F (37 7 °C) 102 22 137/65 97 %      Temp Source Heart Rate Source Patient Position - Orthostatic VS BP Location FiO2 (%)   Oral Monitor Lying Left arm --      Pain Score       8           Orthostatic Vital Signs  Vitals:    09/05/18 2054   BP: 137/65   Pulse: 102   Patient Position - Orthostatic VS: Lying       Physical Exam   Constitutional: He is oriented to person, place, and time  He appears well-developed and well-nourished  No distress  HENT:   Head: Normocephalic and atraumatic  Eyes: Conjunctivae are normal  Pupils are equal, round, and reactive to light  Neck: Normal range of motion  Neck supple  No JVD present  Cardiovascular: Normal rate, regular rhythm and normal heart sounds  Exam reveals no gallop and no friction rub  No murmur heard  Pulmonary/Chest: Effort normal and breath sounds normal  No stridor  No respiratory distress  He has no wheezes  He has no rales  Abdominal: Soft  Bowel sounds are normal  He exhibits no distension  There is no tenderness  There is no guarding  Musculoskeletal: Normal range of motion  He exhibits no edema, tenderness or deformity  Neurological: He is alert and oriented to person, place, and time  No cranial nerve deficit or sensory deficit  He exhibits normal muscle tone  Skin: Skin is warm and dry  No rash noted  He is not diaphoretic  No erythema  No pallor  Psychiatric: He has a normal mood and affect  His behavior is normal    Nursing note and vitals reviewed        ED Medications  Medications - No data to display    Diagnostic Studies  Results Reviewed     Procedure Component Value Units Date/Time    CBC and differential [[id number]]     Lab Status:  No result Specimen:  Blood     Comprehensive metabolic panel [[id number]]     Lab Status:  No result Specimen:  Blood                  CT head without contrast    (Results Pending)   CT cervical spine without contrast    (Results Pending)   XR chest 2 views    (Results Pending)   XR shoulder 2+ views LEFT    (Results Pending)         Procedures  Procedures      Phone Consults  ED Phone Contact    ED Course                               [facility] Time    Disposition  Final diagnoses:   None     ED Disposition     None      Follow-up Information    None         Patient's Medications   Discharge Prescriptions    No medications on file     No discharge procedures on file  ED Provider  Attending physically available and evaluated Jessika Staton I managed the patient along with the ED Attending      Electronically Signed by         Princess Morgan MD  09/06/18 0087

## 2018-09-06 NOTE — CONSULTS
Consultation - Neurology   Sherree Rubinstein 70 y o  male MRN: [id number]  Unit/Bed#: [place] [address] [id number] Encounter: [id number]      Assessment/Plan   Assessment:  1  Cervical strain, contusions post fall down steps  2  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, ataxia, intermittent diplopia, sensory alterations in the lower extremities  Plan:  1  Change baclofen to 10 mg HS daily   2  Change Zanaflex 2 mg b i d  for 5-10 days, then can resume p r n  schedule  3  Continue Rebif, Ampyra, Keppra without change  4  No indication for repeat neuro imaging at this time  5  PT and OT evaluations are pending, likely to require ongoing therapies for a few weeks post discharge, teaching regarding safety awareness, home exercise using energy conservation  6  Follow-up in the [facility] as scheduled  7  Further comments and recommendations per the attending neurologist  History of Present Illness     Physician Requesting Consult: Katia Fan MD  Reason for Consult / Principal Problem: progressive MS, fall  Hx and PE limited by:   None    HPI: Sherree Rubinstein is a 70y o  year old male with a history of secondary progressive multiple sclerosis who presented was admitted through the emergency department to [facility] in [facility] on 9/5/2018 with musculoskeletal pain after he fell down a half flight of steps  He reports he had exercised past point of fatigue earlier that day, and although he did nap, as he was descending a flight of steps he was more fatigued than baseline, slid and missed a step then fell forward down the remaining steps  He was wedged against a Gait guard at the bottom of the steps until EMS arrived  Although he refused transport to the hospital, he recalled EMS 1 hour later when he felt fatigue and weakness in both lower extremities precluding him from getting out of bed for bowel evacuation in the bathroom    He arrived in the ED complaining of left shoulder, upper in mid back pain, cervical pain  He was made comfortable with injectable Dilaudid  CT head and cervical spine or without evidence of traumatic injury  His vital signs were stable  Since admission, he has complained of pain in the cervical musculature and spasticity/paresthesias/weakness in the lower extremities  His chronic pain medications have been off schedule, and he missed 4 doses of Ampyra  He acknowledges using baclofen 20 mg p r n , usually at bedtime, at home but finds it to be of minimal value  He also uses Zanaflex p r n  He does adhere to scheduled Klonopin at HS  Sensory is omitting Ampyra, his leg strength has increased, even exceeding when he was able to do at home  He has no new neurologic deficits pre or post fall, his chronic deficits are stable    Inpatient consult to Neurology  Consult performed by: Judith Castro ordered by: Al Steele          Review of Systems   Constitutional: Positive for activity change  Negative for appetite change  HENT: Negative for hearing loss and trouble swallowing  Eyes: Positive for visual disturbance (Chronic intermittent diplopia, chronic squint)  Respiratory: Negative for chest tightness and shortness of breath  Denies chest wall pain   Cardiovascular: Negative for chest pain and palpitations  Gastrointestinal: Positive for constipation (Occasional)  Negative for abdominal pain  He was not incontinent of stool prior to admission   Musculoskeletal: Positive for back pain, gait problem (Chronic), neck pain and neck stiffness  Neurological: Positive for weakness (Chronic) and numbness (Lower extremities)  Psychiatric/Behavioral: Positive for dysphoric mood  All other systems reviewed and are negative        Historical Information   Past Medical History:   Diagnosis Date    ANANDA (acute kidney injury) ([facility] ) 1/23/2016    Arthritis     lower back    Depression     [facility] sclerosis ([facility] )     Psychiatric disorder depression    Seizures (HCC)     pseudoseizures     History reviewed  No pertinent surgical history  Social History   History   Alcohol Use No     History   Drug Use No     History   Smoking Status    Current Every Day Smoker    Packs/day: 1 00    Types: E-Cigarettes    Last attempt to quit: 1/20/2012   Smokeless Tobacco    Never Used     Comment: Pt states he quit smoking by using electronic cigarettes  Now does not smoke anything     Family History:   Family History   Problem Relation Age of Onset    Heart disease Father        Review of previous medical records was completed  Meds/Allergies   current meds:   Current Facility-Administered Medications   Medication Dose Route Frequency    acetaminophen (TYLENOL) tablet 650 mg  650 mg Oral Q6H PRN    baclofen tablet 20 mg  20 mg Oral TID PRN    calcium carbonate-vitamin D (OSCAL-D) 500 mg-200 units per tablet 1 tablet  1 tablet Oral Daily With Breakfast    celecoxib (CeleBREX) capsule 200 mg  200 mg Oral BID    citalopram (CeleXA) tablet 40 mg  40 mg Oral Daily    clonazePAM (KlonoPIN) tablet 1 5 mg  1 5 mg Oral HS    Dalfampridine ER TB12 10 mg  1 tablet Oral Q12H    gabapentin (NEURONTIN) capsule 300 mg  300 mg Oral HS    [START ON 9/7/2018] Interferon Beta-1a SOAJ 44 mcg  44 mcg Injection Once per day on Mon Wed Fri    levETIRAcetam (KEPPRA) tablet 500 mg  500 mg Oral BID    lidocaine (LIDODERM) 5 % patch 1 patch  1 patch Transdermal Daily    multivitamin-minerals (CENTRUM) tablet 1 tablet  1 tablet Oral Daily    oxyCODONE (OxyCONTIN) 12 hr tablet 20 mg  20 mg Oral BID    saccharomyces boulardii (FLORASTOR) capsule 250 mg  250 mg Oral BID    tiZANidine (ZANAFLEX) tablet 4 mg  4 mg Oral HS PRN    and PTA meds:   Prior to Admission Medications   Prescriptions Last Dose Informant Patient Reported? Taking? AMPYRA 10 MG TB12   No No   Sig: TAKE ONE TABLET (10 MG) BY MOUTH TWICE DAILY (APPROXIMATELY 12 HOURS APART)   MAY BE TAKEN WITH OR WITHOUT FOOD  DO NOT CUT OR CRUSH  STORE A   Calcium Citrate-Vitamin D (CALCIUM + D PO)  Self Yes No   Sig: Take by mouth   Lactobacillus (ACIDOPHILUS PROBIOTIC) TABS  Self Yes No   Sig: Take 1 tablet by mouth daily     Multiple Vitamin (MULTIVITAMIN) tablet  Self Yes No   Sig: Take 1 tablet by mouth daily   OxyCODONE HCl (OXYCONTIN PO)  Self Yes No   Sig: Take 20 mg by mouth 2 (two) times a day     REBIF REBIDOSE 44 MCG/0 5ML SOAJ   No No   Sig: INJECT 44 MCG (0 5ML) UNDER THE SKIN 3 TIMES PER WEEK   TiZANidine (ZANAFLEX) 4 MG capsule  Self Yes No   Sig: Take 4 mg by mouth daily at bedtime as needed for muscle spasms     b complex vitamins tablet  Self Yes No   Sig: Take 1 tablet by mouth daily  baclofen 20 mg tablet  Self No No   Sig: TAKE 1 TABLET THREE TIMES A DAY AS NEEDED   celecoxib (CeleBREX) 200 mg capsule  Self Yes No   Sig: Take 200 mg by mouth 2 (two) times a day     citalopram (CeleXA) 20 mg tablet   No No   Sig: TAKE 2 TABLETS EVERY DAY   clonazePAM (KlonoPIN) 0 5 mg tablet   No No   Sig: TAKE 3 TABS AT BEDTIME   gabapentin (NEURONTIN) 300 mg capsule  Self No No   Sig: TAKE 1 CAPSULE BEDTIME   levETIRAcetam (KEPPRA) 500 mg tablet   No No   Sig: Take 1 tablet (500 mg total) by mouth 2 (two) times a day      Facility-Administered Medications: None       Allergies   Allergen Reactions    Fentanyl Other (See Comments)     Shakes/tremors    Ketamine     Tea Tree Oil Seizures    Ultram [Tramadol] Other (See Comments)     Shakes, tremors       Objective   Vitals:Blood pressure 119/73, pulse 76, temperature (!) 96 6 °F (35 9 °C), temperature source Temporal, resp  rate 17, height 5' 6" (1 676 m), weight 64 8 kg (142 lb 13 7 oz), SpO2 93 %  ,Body mass index is 23 06 kg/m²      Intake/Output Summary (Last 24 hours) at 09/06/18 1549  Last data filed at 09/06/18 1301   Gross per 24 hour   Intake                0 ml   Output              200 ml   Net             -200 ml       Physical Exam   Constitutional: He is oriented to person, place, and time  No distress  HENT:   Head: Normocephalic and atraumatic  Mouth/Throat: Oropharynx is clear and moist    Eyes: Conjunctivae and EOM are normal  Pupils are equal, round, and reactive to light  No scleral icterus  Neck: Normal range of motion  Neck supple  Muscular tenderness (Minimal) present  Carotid bruit is not present  Erythema present  Cardiovascular: Normal rate, regular rhythm and intact distal pulses  No murmur heard  Pulmonary/Chest: Effort normal and breath sounds normal    Abdominal: Soft  Bowel sounds are normal    Musculoskeletal: He exhibits no edema or deformity  Minimal restricted range of motion left ankle dorsiflexors   Neurological: He is oriented to person, place, and time  He has normal strength  He has an abnormal Finger-Nose-Finger Test (Mildly dystaxic right greater than left)  Heel-to-shin test: Moderately dystaxic right greater than left  Reflex Scores:       Tricep reflexes are 3+ on the right side and 3+ on the left side  Bicep reflexes are 3+ on the right side and 3+ on the left side  Brachioradialis reflexes are 3+ on the right side and 3+ on the left side  Patellar reflexes are 3+ on the right side and 3+ on the left side  Achilles reflexes are 1+ on the right side and 1+ on the left side  Skin: Skin is warm and dry  He is not diaphoretic  Psychiatric: He has a normal mood and affect  His behavior is normal  His speech is slurred  Vitals reviewed  Neurologic Exam     Mental Status   Oriented to person, place, and time  Follows 2 step commands  Attention: decreased (Two omissions when reciting reverse order months)  Concentration: decreased  Speech: slurred (Mild slowed speech christa)  Normal comprehension  Cranial Nerves     CN II   Visual fields full to confrontation  CN III, IV, VI   Pupils are equal, round, and reactive to light    Extraocular motions are normal    Right pupil: Size: 3 mm  Shape: regular  Reactivity: sluggish  Consensual response: APD  Left pupil: Size: 3 mm  Shape: regular  Reactivity: brisk  Nystagmus: none (+coarse saccades)   Conjugate gaze: present    CN V   Facial sensation intact  CN VII   Right facial weakness: none  Left facial weakness: central    CN VIII   CN VIII normal    Hearing: intact    CN IX, X   CN IX normal    CN X normal    Palate: symmetric    CN XI   CN XI normal    Right sternocleidomastoid strength: normal  Left sternocleidomastoid strength: normal  Right trapezius strength: normal  Left trapezius strength: normal    CN XII   CN XII normal    Tongue deviation: none  Unable to visualize optic discs     Motor Exam   Muscle bulk: decreased  Right arm tone: increased  Left arm tone: normal  Right arm pronator drift: absent  Left arm pronator drift: absent  Right leg tone: increased  Left leg tone: normal    Strength   Strength 5/5 throughout  Sensory Exam   Light touch normal    Right arm vibration: normal  Left arm vibration: normal  Right leg vibration: decreased from toes  Left leg vibration: decreased from toes  Right arm pinprick: normal  Left arm pinprick: normal  Right leg pinprick: Hyperesthetic below knee into feet  Left leg pinprick: Hyperesthetic below knee into feet  Temperature intact throughout     Gait, Coordination, and Reflexes     Gait  Gait: (Deferred)    Coordination   Finger to nose coordination: abnormal (Mildly dystaxic right greater than left)  Heel-to-shin test: Moderately dystaxic right greater than left      Tremor   Resting tremor: absent  Intention tremor: absent  Action tremor: absent    Reflexes   Right brachioradialis: 3+  Left brachioradialis: 3+  Right biceps: 3+  Left biceps: 3+  Right triceps: 3+  Left triceps: 3+  Right patellar: 3+  Left patellar: 3+  Right achilles: 1+  Left achilles: 1+  Right plantar: equivocal (Withdrawal, suspect upgoing)  Left plantar: upgoing  Right Ladd: absent  Left Ladd: absentNo cerebellar rebound           Lab Results:   CBC:   Results from last 7 days  Lab Units 09/06/18  0552 09/05/18  2154 09/05/18  2146   WBC Thousand/uL 7 03  --  10 30*   RBC Million/uL 3 71*  --  4 15   HEMOGLOBIN g/dL 11 5*  --  12 7   I STAT HEMOGLOBIN g/dl  --  13 3  --    HEMATOCRIT % 35 4* 39 39 3   MCV fL 95  --  95   PLATELETS Thousands/uL 214  --  243   , BMP/CMP:   Results from last 7 days  Lab Units 09/06/18  0552 09/05/18  2154 09/05/18  2146   SODIUM mmol/L 141  --  140   POTASSIUM mmol/L 4 4  --  4 2   CHLORIDE mmol/L 105  --  105   CO2 mmol/L 30  --  27   BUN mg/dL 25  --  28*   CREATININE mg/dL 1 24  --  1 22   GLUCOSE, ISTAT mg/dl  --  154*  --    CALCIUM mg/dL 9 4  --  9 9   AST U/L  --   --  38   ALT U/L  --   --  43   ALK PHOS U/L  --   --  97   EGFR ml/min/1 73sq m 61 70 62               06/25/2018       06/14/2018        10/2017       02/2017       11/2016  LEV:        29 5                 46 6                  22               26 6             65 4     Imaging Studies: I have personally reviewed pertinent reports  and I have personally reviewed pertinent films in PACS   CT head: No intracranial mass, mass effect or midline shift  No CT signs of acute infarction  No acute parenchymal hemorrhage  Subtle periventricular white matter focal hypoattenuating lesions are noted and correspond to known multiple sclerosis and findings on recent MRI  VENTRICLES AND EXTRA-AXIAL SPACES:  Prominence of the subarachnoid space overlying the frontal convexities is stable  CT cervical spine: Normal alignment of the cervical spine  No subluxation  No fracture  Moderate multilevel cervical degenerative changes are noted  No critical central canal stenosis  06/2018 MRI brain w/wo: Extensive diffuse white matter disease throughout the cerebral hemispheres predominantly immediately adjacent to the ventricular surface  However, there are multiple peripheral juxtacortical lesions as well    Many of the lesions are hyperintense on T2-weighted imaging and demonstrates low signal on T1-weighted imaging  Moderate disease within the brainstem as well  Overall no significant change in the extent of disease  Moderate cerebral volume loss  Postcontrast imaging of the brain demonstrates no abnormal enhancement  There is no discrete mass, mass effect or midline shift  There is no intracranial hemorrhage  There is no evidence of acute infarction  VENTRICLES:  Mildly enlarged, reflective of the degree of parenchymal volume loss  EKG, Pathology, and Other Studies: I have personally reviewed pertinent reports  CT chest/abd/pelvis: No acute traumatic injury identified within the chest, abdomen or pelvis  There is a 1 cm nodule in the left lower lobe  Slight interval increase in size of nonspecific sclerotic lesions in the left iliac wing and L2 vertebral body since 2015 these may be benign bone islands given slow growth

## 2018-09-06 NOTE — H&P
H&P- Atlee Overall 1955, 70 y o  male MRN: [id number]    Unit/Bed#: [address] [id number] Encounter: [id number]    Primary Care Provider: Jacquelin Marie DO   Date and time admitted to hospital: 9/5/2018  8:47 PM      * Fall down stairs   Assessment & Plan    · CT brain:  Negative for acute intracranial abnormality  Soft tissue swelling along the left posterior occiput  CT C/S spine:  Negative for fracture or traumatic malalignment  · Neuro checks q4h   · PT consult  · Fall precaution        Neck strain, initial encounter   Assessment & Plan    · Lidocaine patch  · Pain control  · Ice pack  · PT consult        Ambulatory dysfunction   Assessment & Plan    · At baseline patient ambulates with walker, sometimes ambulates independently  Reports increased weakness of B/L lower extremities after fall  H/o MS   · PT consult  ·  consult for possible rehab placement        Seizure disorder [facility])   Assessment & Plan    · Denies seizure activity  · On Keppra, continue  · Seizure precautions        Dysphagia   Assessment & Plan    · At home eats a regular diet with thick liquids, continue  · Aspiration precautions        Lung nodule < 6cm on CT   Assessment & Plan    · CT Chest shows a 1 cm nodule in the left lower lobe  Recommendation for CT follow-up in 3 months vs PET  Depression   Assessment & Plan    · Continue Celexa        Multiple sclerosis ([facility] [address] )   Assessment & Plan    · CT: chronic stable periventricular white matter focal hyperattenuating lesions   · Continue ampyra and Rebif  · On Rebif M/W/F, taken on Wedn 9/5/18  Wife will bring home meds  VTE Prophylaxis: not indicated  / reason for no mechanical VTE prophylaxis Ambulate   Code Status: DNR  POLST: POLST is not applicable to this patient  Discussion with family: spouse    Anticipated Length of Stay:  Patient will be admitted on an Observation basis with an anticipated length of stay of  < 2 midnights     Justification for Hospital Stay: s/p fall and weakness    Total Time for Visit, including Counseling / Coordination of Care: 45 minutes  Greater than 50% of this total time spent on direct patient counseling and coordination of care  Chief Complaint:   Fall, leg weakness     History of Present Illness:    Rosemary Lozada is a 70 y o  male who presents with c/o fall and lower extremity weakness  States he was walking down the stairs and fell down a few steps, hitting the back of his head  Complains of neck pain and dizziness when sitting up from lying down position  Denies dizziness or lightheadedness prior to fall  Denies headache, confusion, head pressure, visual changes, tinnitus, nausea, emesis, slurred speech or unilateral weakness  Denies chest pain, palpitations, shortness of breath, fevers, cough, chills, loss of appetite, abdominal pain, NVCD or dysuria  Review of Systems:    Review of Systems   HENT: Negative  Respiratory: Negative  Cardiovascular: Negative  Gastrointestinal: Negative  Genitourinary: Negative  Musculoskeletal: Positive for neck pain  Neurological: Positive for dizziness and weakness  Negative for tremors, seizures, syncope, facial asymmetry, speech difficulty, light-headedness, numbness and headaches  Psychiatric/Behavioral: Negative  Past Medical and Surgical History:     Past Medical History:   Diagnosis Date    ANANDA (acute kidney injury) ([facility] [address] ) 1/23/2016    Arthritis     lower back    Depression     Multiple sclerosis (HCC)     Psychiatric disorder     depression    Seizures (HCC)     pseudoseizures       History reviewed  No pertinent surgical history  Meds/Allergies:    Prior to Admission medications    Medication Sig Start Date End Date Taking? Authorizing Provider   AMPYRA 10 MG TB12 TAKE ONE TABLET (10 MG) BY MOUTH TWICE DAILY (APPROXIMATELY 12 HOURS APART)  MAY BE TAKEN WITH OR WITHOUT FOOD  DO NOT CUT OR CRUSH   STORE A 8/21/18   DENYS [place] complex vitamins tablet Take 1 tablet by mouth daily  Historical Provider, MD   baclofen 20 mg tablet TAKE 1 TABLET THREE TIMES A DAY AS NEEDED 3/26/18   [facility]   Calcium Citrate-Vitamin D (CALCIUM + D PO) Take by mouth    Historical Provider, MD   celecoxib (CeleBREX) 200 mg capsule Take 200 mg by mouth    Historical Provider, MD   citalopram (CeleXA) 20 mg tablet TAKE 2 TABLETS EVERY DAY 7/20/18   Maryanne Groves MD   clonazePAM (KlonoPIN) 0 5 mg tablet TAKE 3 TABS AT BEDTIME 8/31/18   [facility], DENYS   gabapentin (NEURONTIN) 300 mg capsule TAKE 1 CAPSULE BEDTIME 6/12/18   [facility], [facility]   HYDROcodone-acetaminophen (NORCO) 5-325 mg per tablet Take 1 tablet by mouth every 6 (six) hours as needed for pain    Historical Provider, MD   Lactobacillus (ACIDOPHILUS PROBIOTIC) TABS Take by mouth    Historical Provider, MD   levETIRAcetam (KEPPRA) 500 mg tablet Take 1 tablet (500 mg total) by mouth 2 (two) times a day 6/19/18   [facility]   lidocaine (LIDODERM) 5 % Place 1 patch on the skin every 24 hours for 30 days Remove & Discard patch within 12 hours or as directed by MD 9/6/18 10/6/18  Rubi Settler, DO   Multiple Vitamin (MULTIVITAMIN) tablet Take 1 tablet by mouth daily    Historical Provider, MD   OxyCODONE HCl (OXYCONTIN PO) Take 20 mg by mouth 2 (two) times a day      Historical Provider, MD UGARTE REBIDOSE 44 MCG/0 5ML SOAJ INJECT 44 MCG (0 5ML) UNDER THE SKIN 3 TIMES PER WEEK 6/28/18   HANK Arce   TiZANidine (ZANAFLEX) 4 MG capsule Take 4 mg by mouth 3 (three) times a day    Historical Provider, MD     I have reviewed home medications with patient personally  Allergies:    Allergies   Allergen Reactions    Fentanyl Other (See Comments)     Shakes/tremors    Ketamine     Tea Tree Oil Seizures    Ultram [Tramadol] Other (See Comments)     Shakes, tremors       Social History:     Marital Status: /Civil Union   Occupation: retired  Patient Pre-hospital Living Situation: lives at home w spouse  Patient Pre-hospital Level of Mobility: walker   Patient Pre-hospital Diet Restrictions:  Regular diet with thick liquids  Substance Use History:   History   Alcohol Use No     History   Smoking Status    Current Every Day Smoker    Packs/day: 1 00    Types: E-Cigarettes    Last attempt to quit: 1/20/2012   Smokeless Tobacco    Never Used     Comment: Pt states he quit smoking by using electronic cigarettes  Now does not smoke anything     History   Drug Use No       Family History:    Family History   Problem Relation Age of Onset    Heart disease Father        Physical Exam:     Vitals:   Blood Pressure: 129/69 (09/06/18 0141)  Pulse: 87 (09/06/18 0141)  Temperature: 99 8 °F (37 7 °C) (09/05/18 2054)  Temp Source: Oral (09/05/18 2054)  Respirations: 20 (09/06/18 0141)  Weight - Scale: 64 4 kg (141 lb 14 4 oz) (09/05/18 2054)  SpO2: 95 % (09/06/18 0141)    Physical Exam   Constitutional: He is oriented to person, place, and time  HENT:   Head: Normocephalic  Head laceration -top left skull/parietal, scant bleeding  Dentures   Eyes: Conjunctivae and EOM are normal  Pupils are equal, round, and reactive to light  Right eye exhibits no discharge  Left eye exhibits no discharge  Neck: Neck supple  Cardiovascular: Normal rate, regular rhythm, normal heart sounds and intact distal pulses  No murmur heard  Pulmonary/Chest: Effort normal and breath sounds normal  No respiratory distress  He has no wheezes  He has no rales  He exhibits no tenderness  Abdominal: Soft  Bowel sounds are normal  He exhibits no distension and no mass  There is no tenderness  There is no rebound and no guarding  Musculoskeletal: He exhibits no edema  Neurological: He is alert and oriented to person, place, and time  EOMI, PERRL, B/L UE 5/5, B/L LE 3/5   Skin: Skin is warm and dry  No rash noted  There is erythema  No pallor     Continue R leg   Psychiatric: His behavior is normal  Judgment and thought content normal      Additional Data:     Lab Results: I have personally reviewed pertinent reports  Results from last 7 days  Lab Units 09/05/18 2154 09/05/18  2146   WBC Thousand/uL  --  10 30*   HEMOGLOBIN g/dL  --  12 7   I STAT HEMOGLOBIN g/dl 13 3  --    HEMATOCRIT % 39 39 3   PLATELETS Thousands/uL  --  243   NEUTROS PCT %  --  89*   LYMPHS PCT %  --  5*   MONOS PCT %  --  6   EOS PCT %  --  0       Results from last 7 days  Lab Units 09/05/18 2154 09/05/18  2146   SODIUM mmol/L  --  140   POTASSIUM mmol/L  --  4 2   CHLORIDE mmol/L  --  105   CO2 mmol/L  --  27   BUN mg/dL  --  28*   CREATININE mg/dL  --  1 22   CALCIUM mg/dL  --  9 9   ALK PHOS U/L  --  97   ALT U/L  --  43   AST U/L  --  38   GLUCOSE, ISTAT mg/dl 154*  --                    Imaging: I have personally reviewed pertinent reports  CT chest abdomen pelvis w contrast   Final Result by Yesica Canales MD (09/05 1517)      1  No acute traumatic injury identified within the chest, abdomen or pelvis  2   There is a 1 cm nodule in the left lower lobe  Based on current Fleischner Society 2017 Guidelines on incidental pulmonary nodule, either PET/CT scan evaluation, tissue sampling or short term interval followup non-contrast CT followup (initially in 3    months) may be considered appropriate  3   Slight interval increase in size of nonspecific sclerotic lesions in the left iliac wing and L2 vertebral body since 2015 these may be benign bone islands given slow growth  I personally discussed this study with Mandi Bowman MD on 9/5/2018 at 11:21 PM                Workstation performed: [id number]         CT cervical spine without contrast   Final Result by Feli Garsia MD (09/05 3414)      No cervical spine fracture or traumatic malalignment  Workstation performed: [id number]         CT head without contrast   Final Result by Feli Garsia MD (09/05 7016)         1    No acute intracranial abnormality  Soft tissue swelling along the left posterior occiput  2   Periventricular white matter focal hyperattenuating lesions corresponding to findings on recent MRI and consistent with known multiple sclerosis  Workstation performed: [id number]             EKG, Pathology, and Other Studies Reviewed on Admission:   · EKG: CT    Allscripts / Epic Records Reviewed: Yes     ** Please Note: This note has been constructed using a voice recognition system   **

## 2018-09-06 NOTE — PLAN OF CARE
Problem: DISCHARGE PLANNING - CARE MANAGEMENT  Goal: Discharge to post-acute care or home with appropriate resources  INTERVENTIONS:  - Conduct assessment to determine patient/family and health care team treatment goals, and need for post-acute services based on payer coverage, community resources, and patient preferences, and barriers to discharge  - Address psychosocial, clinical, and financial barriers to discharge as identified in assessment in conjunction with the patient/family and health care team  - Arrange appropriate level of post-acute services according to patients   needs and preference and payer coverage in collaboration with the physician and health care team  - Communicate with and update the patient/family, physician, and health care team regarding progress on the discharge plan  - Arrange appropriate transportation to post-acute venues  Outcome: Completed Date Met: 09/06/18  Patient to be discharged with appropriate services when medically cleared by MD  No additional needs reported at present  CM to follow as needed

## 2018-09-06 NOTE — ASSESSMENT & PLAN NOTE
· CT brain:  Negative for acute intracranial abnormality  Soft tissue swelling along the left posterior occiput     CT C/S spine:  Negative for fracture or traumatic malalignment  · Neuro checks q4h   · PT consult  · Fall precaution

## 2018-09-06 NOTE — ED ATTENDING ATTESTATION
Adonis Landaverde DO, saw and evaluated the patient  I have discussed the patient with the resident/non-physician practitioner and agree with the resident's/non-physician practitioner's findings, Plan of Care, and MDM as documented in the resident's/non-physician practitioner's note, except where noted  All available labs and Radiology studies were reviewed  At this point I agree with the current assessment done in the Emergency Department  I have conducted an independent evaluation of this patient a history and physical is as follows:    69 yo male on no thinners with progressive MS who was walking down flight of 13 steps at home around 1730 and was halway down when he slipped and fell  Pt hit head, denies LOC but "neck was twisted and underneath me" but unable to better describe position  EMS was called and pt refused care  Around 1 hour PTA he was laying in bed and "I shit myself" and decided to call EMS  States "I just went, I had no control"  Agree with CT head/csp, c/a/p and check labs  Pt requesting something for pain - dilaudid ordered  2230 - Labs all WNL, awaiting CT results  Pt resting comfortably after dilaudid  2355 - 1   No acute intracranial abnormality   Soft tissue swelling along the left posterior occiput  2   Periventricular white matter focal hyperattenuating lesions corresponding to findings on recent MRI and consistent with known multiple sclerosis  2305 - CT csp - No cervical spine fracture or traumatic malalignment  2335 - CT c/a/p - 1   No acute traumatic injury identified within the chest, abdomen or pelvis  2  Wava Paganini is a 1 cm nodule in the left lower lobe  Based on current Fleischner Society 2017 Guidelines on incidental pulmonary nodule, either PET/CT scan evaluation, tissue sampling or short term interval followup non-contrast CT followup (initially in 3  months) may be considered appropriate   3   Slight interval increase in size of nonspecific sclerotic lesions in the left iliac wing and L2 vertebral body since 2015 these may be benign bone islands given slow growth  Reviewed all above findings with pt - only c/o stiff neck  Will give dose of baclofen 20mg (supposed to take 3 times a day but "just didn't take it" today) and lidoderm patch  0050 - IV was removed, pt was dressed and discharged, upon getting to car wife asked for assistance and started freaking out on staff saying it was ridiculous he was being discharged and that she can't care for him at home anymore      Final diagnoses:   Fall down steps, initial encounter   Fall on same level from slipping, tripping or stumbling, initial encounter   Injury of head, initial encounter   Abrasion of scalp, initial encounter   Acute strain of neck muscle, initial encounter     Critical Care Time  CritCare Time    Procedures

## 2018-09-06 NOTE — ASSESSMENT & PLAN NOTE
· CT: chronic stable periventricular white matter focal hyperattenuating lesions   · Continue ampyra and Rebif  · On Rebif M/W/F, taken on Wedn 9/5/18  Wife will bring home meds

## 2018-09-06 NOTE — ASSESSMENT & PLAN NOTE
· At baseline patient ambulates with walker, sometimes ambulates independently  Reports increased weakness of B/L lower extremities after fall   H/o MS   · PT consult  ·  consult for possible rehab placement

## 2018-09-06 NOTE — ASSESSMENT & PLAN NOTE
· CT Chest shows a 1 cm nodule in the left lower lobe  Recommendation for CT follow-up in 3 months vs PET

## 2018-09-06 NOTE — CASE MANAGEMENT
Initial Clinical Review    Admission: Date/Time/Statement:  OBS 9/6 @ 0134    Orders Placed This Encounter   Procedures    Place in Observation (expected length of stay for this patient is less than two midnights)     Standing Status:   Standing     Number of Occurrences:   1     Order Specific Question:   Admitting Physician     Answer:   Zully Polk     Order Specific Question:   Level of Care     Answer:   Med Surg [16]         ED: Date/Time/Mode of Arrival:   ED Arrival Information     Expected Arrival Acuity Means of Arrival Escorted By Service Admission Type    - 9/5/2018 20:47 Emergent Ambulance [facility] General Medicine Emergency    Arrival Complaint    Flank Pain           Chief Complaint:   Chief Complaint   Patient presents with    Fall     patient reports fall down 13 hardwood steps earlier today, wife called EMS, patient refused EMS transport to hospital   patient now c/o pain to left shoulder and left upper and middle back pain  reports left sided neck pain  no c-spine tenderness upon palpation  reports hitting head, no loc   small lacerations noted to head, patient reports carpet tacking was on the floor which caused the lacerations  no thinners reported  History of Illness: 68yo male with a PMHx of secondary progressive MS presents to the ED after a fall down 13 steps  Patient fell around 1730  Refused transport to the ED from EMS but then was lying in bed and felt like he couldn't get up and ended up urinating on himself   Patient     ED Vital Signs:   ED Triage Vitals [09/05/18 2054]   Temperature Pulse Respirations Blood Pressure SpO2   99 8 °F (37 7 °C) 102 22 137/65 97 %      Temp Source Heart Rate Source Patient Position - Orthostatic VS BP Location FiO2 (%)   Oral Monitor Lying Left arm --      Pain Score       8        Wt Readings from Last 1 Encounters:   09/06/18 64 8 kg (142 lb 13 7 oz)       Vital Signs (abnormal):   09/05/18 2219 -- 105 20 128/78 98 % -- RWK 09/05/18 2054 99 8 °F (37 7 °C) 102 22 137/65 97 % 64 4 kg (141 lb 14 4 oz) KW       Abnormal Labs/Diagnostic Test Results:   BUN 28  Glu 158  T Pro 9 0  WBC's 10 30,   ANC 9 12    CT Chest/Abd/Pelvis:   1   No acute traumatic injury identified within the chest, abdomen or pelvis  2  Fred Vidal is a 1 cm nodule in the left lower lobe  3   Slight interval increase in size of nonspecific sclerotic lesions in the left iliac wing and L2 vertebral body since 2015 these may be benign bone islands given slow growth  CT Head: Ellen Hoyos acute intracranial abnormality   Soft tissue swelling along the left posterior occiput  2   Periventricular white matter focal hyperattenuating lesions corresponding to findings on recent MRI and consistent with known multiple sclerosis  CT C Spine: No cervical spine fracture or traumatic malalignment  ED Treatment:   Medication Administration from 09/05/2018 2047 to 09/06/2018 0046       Date/Time Order Dose Route Action Action by Comments     09/05/2018 2157 HYDROmorphone (DILAUDID) injection 1 mg 1 mg Intravenous Given       09/05/2018 2228 iohexol (OMNIPAQUE) 350 MG/ML injection (MULTI-DOSE) 100 mL 100 mL Intravenous Given       09/06/2018 0008 baclofen tablet 20 mg 20 mg Oral Given       09/06/2018 0008 lidocaine (LIDODERM) 5 % patch 1 patch 1 patch Transdermal Medication Applied  neck          Past Medical/Surgical History:   Past Medical History:   Diagnosis Date    ANANDA (acute kidney injury) ([facility] [address] ) 1/23/2016    Arthritis     Depression     Multiple sclerosis (HCC)     Psychiatric disorder     Seizures (HCC)        Admitting Diagnosis: Flank pain [R10 9]  Fall down steps, initial encounter [W10 8XXA]  Injury of head, initial encounter [S09 90XA]  Acute strain of neck muscle, initial encounter [S16  1XXA]  Abrasion of scalp, initial encounter [S00 01XA]  Fall on same level from slipping, tripping or stumbling, initial encounter [W01  0XXA]    Age/Sex: 70 y o  male    Assessment/Plan:  71 yo male presented to ED from home after fall down stairs and LE Weakness  W/U negative thus far for fracture or traumatic malalignment or acute intracranial abnormality  +Soft tissue swelling along the left posterior occiput  Neuro checks  q4h, Consult Neuro, PT Eval,  Pain Control  Continue Keppra for Seizure disorder  Continue ampyra and Rebif for MS  Admission Orders:  OBS  Neuro Checks q4h  Dysphagia 3, Dental soft, Nectar thick liquids  Fall Precautions  Seizure precautions  Aspiration Precautions  PT Eval  Consult Neuro  CBC,  BMP    Scheduled Meds:   Current Facility-Administered Medications:                calcium carbonate-vitamin D 1 tablet Oral Daily With Breakfast    celecoxib 200 mg Oral BID    citalopram 40 mg Oral Daily    clonazePAM 1 5 mg Oral HS    Dalfampridine ER 1 tablet Oral BID    gabapentin 300 mg Oral HS    [START ON 9/7/2018] Interferon Beta-1a 44 mcg Injection Once per day on Mon Wed Fri    levETIRAcetam 500 mg Oral BID    lidocaine 1 patch Transdermal Once    lidocaine 1 patch Transdermal Daily    multivitamin-minerals 1 tablet Oral Daily    oxyCODONE 20 mg Oral BID    saccharomyces boulardii 250 mg Oral BID             Continuous Infusions:    PRN Meds:   acetaminophen    baclofen    tiZANidine x1  9/6 9/6: 95 8 - 66 - 17   108/67  H&H 11 5 / 35 4    Thank you,  [address] [place] [address] Utilization Review Department  Phone: [phone]; Fax [phone]  ATTENTION: Please call with any questions or concerns to [phone]  and carefully follow the prompts so that you are directed to the right person  Send all requests for admission clinical reviews, approved or denied determinations and any other requests to fax [phone]   All voicemails are confidential

## 2018-09-07 LAB
BACTERIA UR QL AUTO: ABNORMAL /HPF
BILIRUB UR QL STRIP: NEGATIVE
CLARITY UR: ABNORMAL
COLOR UR: YELLOW
GLUCOSE UR STRIP-MCNC: NEGATIVE MG/DL
HGB UR QL STRIP.AUTO: NEGATIVE
KETONES UR STRIP-MCNC: NEGATIVE MG/DL
LEUKOCYTE ESTERASE UR QL STRIP: NEGATIVE
NITRITE UR QL STRIP: NEGATIVE
NON-SQ EPI CELLS URNS QL MICRO: ABNORMAL /HPF
PH UR STRIP.AUTO: 6 [PH] (ref 4.5–8)
PROT UR STRIP-MCNC: ABNORMAL MG/DL
RBC #/AREA URNS AUTO: ABNORMAL /HPF
SP GR UR STRIP.AUTO: 1.02 (ref 1–1.03)
UROBILINOGEN UR QL STRIP.AUTO: 0.2 E.U./DL
WBC #/AREA URNS AUTO: ABNORMAL /HPF

## 2018-09-07 PROCEDURE — G8979 MOBILITY GOAL STATUS: HCPCS

## 2018-09-07 PROCEDURE — 97163 PT EVAL HIGH COMPLEX 45 MIN: CPT

## 2018-09-07 PROCEDURE — G8978 MOBILITY CURRENT STATUS: HCPCS

## 2018-09-07 PROCEDURE — 99225 PR SBSQ OBSERVATION CARE/DAY 25 MINUTES: CPT | Performed by: INTERNAL MEDICINE

## 2018-09-07 PROCEDURE — 81001 URINALYSIS AUTO W/SCOPE: CPT | Performed by: NURSE PRACTITIONER

## 2018-09-07 PROCEDURE — 97110 THERAPEUTIC EXERCISES: CPT

## 2018-09-07 PROCEDURE — 97530 THERAPEUTIC ACTIVITIES: CPT

## 2018-09-07 RX ORDER — BACLOFEN 10 MG/1
10 TABLET ORAL
Status: DISCONTINUED | OUTPATIENT
Start: 2018-09-07 | End: 2018-09-07

## 2018-09-07 RX ORDER — TIZANIDINE 4 MG/1
2 TABLET ORAL 2 TIMES DAILY
Status: DISCONTINUED | OUTPATIENT
Start: 2018-09-07 | End: 2018-09-08

## 2018-09-07 RX ORDER — TIZANIDINE 4 MG/1
2 TABLET ORAL
Status: DISCONTINUED | OUTPATIENT
Start: 2018-09-07 | End: 2018-09-08

## 2018-09-07 RX ADMIN — CELECOXIB 200 MG: 200 CAPSULE ORAL at 09:22

## 2018-09-07 RX ADMIN — LEVETIRACETAM 500 MG: 500 TABLET ORAL at 09:22

## 2018-09-07 RX ADMIN — GABAPENTIN 300 MG: 300 CAPSULE ORAL at 20:23

## 2018-09-07 RX ADMIN — DALFAMPRIDINE 10 MG: 10 TABLET, FILM COATED, EXTENDED RELEASE ORAL at 11:55

## 2018-09-07 RX ADMIN — CALCIUM CARBONATE 500 MG (1,250 MG)-VITAMIN D3 200 UNIT TABLET 1 TABLET: at 07:40

## 2018-09-07 RX ADMIN — CELECOXIB 200 MG: 200 CAPSULE ORAL at 17:22

## 2018-09-07 RX ADMIN — OXYCODONE HYDROCHLORIDE 20 MG: 20 TABLET, FILM COATED, EXTENDED RELEASE ORAL at 20:23

## 2018-09-07 RX ADMIN — OXYCODONE HYDROCHLORIDE 20 MG: 20 TABLET, FILM COATED, EXTENDED RELEASE ORAL at 09:22

## 2018-09-07 RX ADMIN — TIZANIDINE 2 MG: 4 TABLET ORAL at 17:22

## 2018-09-07 RX ADMIN — Medication 250 MG: at 17:21

## 2018-09-07 RX ADMIN — TIZANIDINE 2 MG: 4 TABLET ORAL at 11:55

## 2018-09-07 RX ADMIN — Medication 1 TABLET: at 09:22

## 2018-09-07 RX ADMIN — TIZANIDINE 4 MG: 4 TABLET ORAL at 04:11

## 2018-09-07 RX ADMIN — CITALOPRAM HYDROBROMIDE 40 MG: 20 TABLET ORAL at 09:22

## 2018-09-07 RX ADMIN — CLONAZEPAM 1.5 MG: 1 TABLET ORAL at 20:23

## 2018-09-07 RX ADMIN — LEVETIRACETAM 500 MG: 500 TABLET ORAL at 17:22

## 2018-09-07 RX ADMIN — Medication 250 MG: at 09:22

## 2018-09-07 NOTE — ASSESSMENT & PLAN NOTE
Appreciate Neurology recommendation  Continue Ampyra and Rebiff for MS  Tizanidine increased to twice a day

## 2018-09-07 NOTE — SOCIAL WORK
CM received notice that patient does not want [facility] for STR  Referrals sent to [facility], [place], and [address] [place] [address]  Authorization will need to be obtained starting Monday due to insurance companies being closed over the weekend     Cm to follow up on Monday

## 2018-09-07 NOTE — CASE MANAGEMENT
Continued Stay Review    Date: 9/7/2018    Vital Signs: /72 (BP Location: Left arm)   Pulse 62   Temp (!) 96 8 °F (36 °C) (Tympanic)   Resp 18   Ht 5' 6" (1 676 m)   Wt 64 8 kg (142 lb 13 7 oz)   SpO2 90%   BMI 23 06 kg/m²     Medications:   Scheduled Meds:  Current Facility-Administered Medications:         baclofen 10 mg Oral HS    calcium carbonate-vitamin D 1 tablet Oral Daily With Breakfast    celecoxib 200 mg Oral BID    citalopram 40 mg Oral Daily    clonazePAM 1 5 mg Oral HS    Dalfampridine ER 1 tablet Oral Q12H    gabapentin 300 mg Oral HS    Interferon Beta-1a 44 mcg Injection Once per day on Mon Wed Fri    levETIRAcetam 500 mg Oral BID    lidocaine 1 patch Transdermal Daily    multivitamin-minerals 1 tablet Oral Daily    oxyCODONE 20 mg Oral BID    saccharomyces boulardii 250 mg Oral BID    tiZANidine 2 mg Oral BID        PRN Meds:   Acetaminophen x 1 9/6    Baclofen x 1 9/6    tiZANidine x 2  9/6  And x 1 9/7    Abnormal Labs/Diagnostic Results:   Urine:  1+ protein,  occ bacteria    Age/Sex: 70 y o  male     Assessment/Plan:   Per Neuro:   1  Cervical strain, contusions post fall down steps  2  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, ataxia, intermittent diplopia, sensory alterations in the lower extremities   Plan:  Change baclofen to 10 mg HS daily   Change Zanaflex 2 mg b i d  for 5-10 days, then can resume p r n  schedule  Continue Rebif, Ampyra, Keppra without change    Discharge Plan: To be determined  PT recommending  Rehab    Thank you,  [address]  Utilization Review Department  Phone: [phone]; Fax [phone]  ATTENTION: Please call with any questions or concerns to [phone]  and carefully follow the prompts so that you are directed to the right person  Send all requests for admission clinical reviews, approved or denied determinations and any other requests to fax [phone]   All voicemails are confidential

## 2018-09-07 NOTE — PROGRESS NOTES
Progress Note - Luis Habermann 1955, 70 y o  male MRN: [id number]    Unit/Bed#: [address] [id number] Encounter: [id number]    Primary Care Provider: Shirlene Lockhart DO   Date and time admitted to hospital: 2018  8:47 PM        * Fall down stairs   Assessment & Plan    With ambulatory dysfunction  Mechanical fall on top underlying secondary progressive multiple sclerosis  He continues to be weak and not at baseline  Short-term rehab recommended  Secondary progressive multiple sclerosis ([facility] [address] )   Assessment & Plan    Appreciate Neurology recommendation  Continue Ampyra and Rebiff for MS  Tizanidine increased to twice a day        Seizure disorder ([facility] [address] )   Assessment & Plan    · On Keppra, continue  · Seizure precautions        Oropharyngeal dysphagia   Assessment & Plan    Continue mechanical diet with thin liquid        Depression   Assessment & Plan    · Continue Celexa          VTE Pharmacologic Prophylaxis:   Pharmacologic: Enoxaparin (Lovenox)  Mechanical VTE Prophylaxis in Place: No    Time Spent for Care: 20 minutes  More than 50% of total time spent on counseling and coordination of care as described above  Current Length of Stay: 0 day(s)    Current Patient Status: Observation   Certification Statement: The patient, admitted on an observation basis, will now require > 2 midnight hospital stay due to Persistent weakness    Discharge Plan:  Short-term rehab    Code Status: Level 3 - DNAR and DNI      Subjective:   Still not at baseline  He still is weak and unsteady when he stands up  He is agreeable to go to rehab    Objective:     Vitals:   Temp (24hrs), Av 8 °F (36 6 °C), Min:96 8 °F (36 °C), Max:98 4 °F (36 9 °C)    HR:  [60-96] 96  Resp:  [17-18] 18  BP: (102-121)/(72-77) 121/77  SpO2:  [90 %-96 %] 94 %  Body mass index is 23 06 kg/m²  Input and Output Summary (last 24 hours):        Intake/Output Summary (Last 24 hours) at 18 1702  Last data filed at 18 1628   Gross per 24 hour   Intake                0 ml   Output              500 ml   Net             -500 ml       Physical Exam:     Physical Exam   Constitutional: He appears well-developed and well-nourished  HENT:   Head: Normocephalic and atraumatic  Eyes: No scleral icterus  Neck: No JVD present  Cardiovascular: Regular rhythm  Exam reveals no friction rub  No murmur heard  Pulmonary/Chest: Effort normal  No respiratory distress  He has no wheezes  Abdominal: Soft  He exhibits no distension  There is no tenderness  Musculoskeletal: He exhibits no edema  Neurological: He is alert  Flat affect  Movements are less bradykinetic    Skin: Skin is warm and dry  Psychiatric: He has a normal mood and affect  Additional Data:     Labs:      Results from last 7 days  Lab Units 09/06/18  0552   WBC Thousand/uL 7 03   HEMOGLOBIN g/dL 11 5*   HEMATOCRIT % 35 4*   PLATELETS Thousands/uL 214   NEUTROS PCT % 78*   LYMPHS PCT % 13*   MONOS PCT % 9   EOS PCT % 0       Results from last 7 days  Lab Units 09/06/18  0552 09/05/18  2154 09/05/18  2146   SODIUM mmol/L 141  --  140   POTASSIUM mmol/L 4 4  --  4 2   CHLORIDE mmol/L 105  --  105   CO2 mmol/L 30  --  27   BUN mg/dL 25  --  28*   CREATININE mg/dL 1 24  --  1 22   CALCIUM mg/dL 9 4  --  9 9   ALK PHOS U/L  --   --  97   ALT U/L  --   --  43   AST U/L  --   --  38   GLUCOSE, ISTAT mg/dl  --  154*  --                      * I Have Reviewed All Lab Data Listed Above    * Additional Pertinent Lab Tests Reviewed: No New Labs Available For Today    Imaging:    Imaging Reports Reviewed Today Include:  No new imaging  Recent Cultures (last 7 days):           Last 24 Hours Medication List:     Current Facility-Administered Medications:  acetaminophen 650 mg Oral Q6H PRN Cyndi Hockey, CRNP   baclofen 10 mg Oral HS Devante Arnold, HANK   calcium carbonate-vitamin D 1 tablet Oral Daily With Breakfast Cyndi Hockey, CRNP   celecoxib 200 mg Oral BID Sasha Hale Danyel Joseph, HANK   citalopram 40 mg Oral Daily Marikay Le, CRNP   clonazePAM 1 5 mg Oral HS Marikay Le, CRNP   Dalfampridine ER 1 tablet Oral Q12H Marikay Le, CRNP   gabapentin 300 mg Oral HS Marikay Le, CRNP   Interferon Beta-1a 44 mcg Injection Once per day on Mon Wed Fri Kimmykay Le, CRNP   levETIRAcetam 500 mg Oral BID Marikay Le, CRNP   lidocaine 1 patch Transdermal Daily Marikay Le, CRNP   multivitamin-minerals 1 tablet Oral Daily Marikay Le, CRNP   oxyCODONE 20 mg Oral BID Marikay Le, CRNP   saccharomyces boulardii 250 mg Oral BID Marikay Le, CRNP   tiZANidine 2 mg Oral BID HANK Pickett        Today, Patient Was Seen By: Brady Pond MD    ** Please Note: Dictation voice to text software may have been used in the creation of this document   **

## 2018-09-07 NOTE — SOCIAL WORK
Patient accepted to [facility] for STR  Patient will need authorization, however unable to obtain due to the weekend  [facility] is obtaining this auth and started today  Will follow up on Monday  Cm to follow as needed

## 2018-09-07 NOTE — ASSESSMENT & PLAN NOTE
With ambulatory dysfunction  Mechanical fall on top underlying secondary progressive multiple sclerosis  He continues to be weak and not at baseline  Short-term rehab recommended

## 2018-09-07 NOTE — PLAN OF CARE
DISCHARGE PLANNING     Discharge to home or other facility with appropriate resources Progressing        MUSCULOSKELETAL - ADULT     Maintain or return mobility to safest level of function Progressing        PAIN - ADULT     Verbalizes/displays adequate comfort level or baseline comfort level Progressing        Potential for Falls     Patient will remain free of falls Progressing        Prexisting or High Potential for Compromised Skin Integrity     Skin integrity is maintained or improved Progressing

## 2018-09-07 NOTE — PHYSICAL THERAPY NOTE
PT EVALUATION (11:21-11:41=20mins)    Pt  Name: Tyrone Lawrence  Pt  Age: 70 y o  MRN: [id number]  LENGTH OF STAY: 0    Patient Active Problem List   Diagnosis    Multiple sclerosis ([place] [address] )    Depression    Weakness    C  difficile colitis    Dysphagia    Chronic back pain    Immunocompromised state ([place] [address] )    Fever, unknown origin    Seizure disorder ([place] [address] )    Fall down stairs    Lung nodule < 6cm on CT    Ambulatory dysfunction    Neck strain, initial encounter       Admitting Diagnoses:   Flank pain [R10 9]    Past Medical History:   Diagnosis Date    ANANDA (acute kidney injury) ([place] [address] ) 1/23/2016    Arthritis     lower back    Depression     Multiple sclerosis ([place] [address] )     Psychiatric disorder     depression    Seizures (HCC)     pseudoseizures       History reviewed  No pertinent surgical history  09/07/18 1205   Note Type   Note type Eval/Treat   Pain Assessment   Pain Assessment 0-10   Pain Score 2   Pain Type Acute pain   Pain Location Neck   Hospital Pain Intervention(s) Medication (See MAR); Repositioned; Ambulation/increased activity; Emotional support   Home Living   Type of [address] Two level;Stairs to enter with rails  (13steps to 2nd level; 5STE w/ B/L railings)   [address] Walker;Cane;Other (Comment)  (rollator that converts into a transport chair)   Prior Function   Level of [place] Independent with ADLs and functional mobility  (w/ rollator)   Lives With Spouse   Receives Help From Family   ADL Assistance Independent   Falls in the last 6 months 0  (pt denies)   Restrictions/Precautions   Weight Bearing Precautions Per Order No   Other Precautions Fall Risk;Pain   General   Additional Pertinent History significant for MS   Family/Caregiver Present No   Cognition   Overall Cognitive Status WFL   Arousal/Participation Alert   Orientation Level Oriented to person;Oriented to place;Oriented to time   Following Commands Follows multistep commands without difficulty   RUE Assessment   RUE Assessment WFL   RUE Strength   RUE Overall Strength Within Functional Limits - able to perform ADL tasks with strength   LUE Assessment   LUE Assessment WFL   LUE Strength   LUE Overall Strength Within Functional Limits - able to perform ADL tasks with strength   RLE Assessment   RLE Assessment WFL   Strength RLE   RLE Overall Strength 4/5   LLE Assessment   LLE Assessment WFL   Strength LLE   LLE Overall Strength 4/5   Coordination   Movements are Fluid and Coordinated 1   Sensation X  (numbness to R fingertips & toes)   Bed Mobility   Supine to Sit 5  Supervision   Additional items HOB elevated; Bedrails; Increased time required;Verbal cues   Transfers   Sit to Stand 4  Minimal assistance   Additional items Assist x 1; Increased time required;Verbal cues   Stand to Sit 4  Minimal assistance   Additional items Assist x 1; Armrests; Increased time required;Verbal cues   Additional Comments (+) tremolousness upon standing   Ambulation/Elevation   Gait pattern Decreased foot clearance;Narrow JYOTSNA; Forward Flexion; Shuffling; Short stride; Excessively slow   Gait Assistance 4  Minimal assist   Additional items Assist x 1;Verbal cues; Tactile cues   Assistive Device Rolling walker   Distance 20'x1   Balance   Static Sitting Fair +   Static Standing Fair -  (w/ RW)   Ambulatory Poor +  (w/ RW)   Endurance Deficit   Endurance Deficit Yes   Endurance Deficit Description deconditioning   Activity Tolerance   Activity Tolerance Patient limited by fatigue;Patient limited by pain   Nurse [address] [place] [address] Dr    Assessment   Prognosis Good   Problem List Decreased strength;Decreased endurance; Impaired balance;Decreased mobility;Pain   Assessment Pt  70 y  o male w/ MS admitted after Fall down stairs w/ c/o neck pain & BLE weakness  No fx identified  Pt referred to PT for mobility assessment & D/C planning w/ orders of ambulate and up w/ assistance  PTA, pt reports being fairly I w/ rollator   On eval, pt demonstrate dec mobility, balance, endurance & amb 2* to deconditioning  Pt require S for bed mobility & minAx1 for transfers & amb w/ RW for most functional mobility + cues for techniques  Gait deviations as above, slow w/ dec foot clearance but no gross LOB noted  Dec amb tolerance 2* to fatigue  No dizziness reported t/o session  Nsg staff most recent vital signs as follows: /72 (BP Location: Left arm)   Pulse 62   Temp (!) 96 8 °F (36 °C) (Tympanic)   Resp 18   Ht 5' 6" (1 676 m)   Wt 64 8 kg (142 lb 13 7 oz)   SpO2 90%   BMI 23 06 kg/m²   At end of session, pt tolerated OOB in chair w/o issues, call bell & phone in reach  Pt admits that he is functioning below his baseline hence will continue skilled PT to improve function & safety  At this time, due to above mentioned deficits, inaccesible home & high risk for falls, pt will benefit from inpt rehab at D/C  However, pt prefers to return home  If home, pt will need HHPT & 24hr(A) + need to achieve PT goals prior to D/C home  CM to follow  Nsg staff to continue to mobilized pt (OOB in chair for all meals & ambulate in room/unit) as tolerated to prevent further decline in function  Nsg notified  Barriers to Discharge Inaccessible home environment   Barriers to Discharge Comments multiple steps    Goals   Patient Goals go home   STG Expiration Date 09/17/18   Short Term Goal #1 Goals to be met in 10 days; pt will be able to: 1) inc strength & balance by 1/2 grade to improve overall functional mobility & dec fall risk; 2) inc bed mobility to modified I for pt to be able to get in/OOB safely w/ proper techniques 100% of the time, to dec caregiver assistance & safely function at home; 3) inc transfers to S for pt to transition safely from one surface to another w/o % of the time, to dec caregiver assistance & safely function at home; 4) inc amb w/ RW approx   >80' w/ S for pt to ambulate household distances w/o any % of the time, to dec caregiver assistance & safely function at home; 5) inc barthel score to 75 to decrease overall risk for falls; 6) negotiate stairs w/ S for inc safety during stair mgt inside/outside of home & dec caregiver assistance; 7) pt/caregiver ed   Treatment Day 1   Plan   Treatment/Interventions Functional transfer training;LE strengthening/ROM; Elevations; Therapeutic exercise; Endurance training;Patient/family training;Bed mobility;Gait training;Spoke to nursing;Spoke to case management   PT Frequency Other (Comment)  (5-6x/wk)   Recommendation   Recommendation Short-term skilled PT; Home PT;24 hour supervision/assist;Home with family support  (pt declined inpt rehab; HHPT & 24hr(A) if home)   Equipment Recommended Walker  (pt has DME at home)   PT - OK to Discharge (yes to inpt rehab; no to home)   Barthel Index   Feeding 10   Bathing 0   Grooming Score 5   Dressing Score 5   Bladder Score 10   Bowels Score 10   Toilet Use Score 5   Transfers (Bed/Chair) Score 10   Mobility (Level Surface) Score 0   Stairs Score 0   Barthel Index Score 55   Hx/personal factors: co-morbidities, inaccessible home, use of AD, pain, h/o of falls and fall risk  Examination: impairements in locomotion, musculoskeletal, balance, endurance, posture, coordination  Clinical: unpredictable (ongoing medical status, abnormal lab values and moderate fall risk)  Complexity: high     PT TREATMENT NOTE:    TIME IN: 11:41  TIME OUT: 12:05  TOTAL TIME: 24mins    S: Pt agreeable to thera ex & to stay OOB in recliner  O: Sit<>stand: minAx1; Amb w/ RW approx  2'x1 w/ minAx1  Pt performed seated BLE thera ex (ankle pumps, LAQs, hip flexion; hip abduction/adduction) [id number], AROM  A: Pt continue to require minAx1 for transfers & amb + cues for techniques  Pt tolerated above mentioned thera  ex well + rest periods in between exercises 2* to fatigue  Pt c/o fatigue at end of session w/ pt requesting to get back in bed   But pt remain OOB in recliner w/ BLE elevated after encouragement  Discussed PT POC, tx intervention & D/C rec w/ good understanding  Discussed benefits of inpt rehab but pt still prefers to return home at D/C  If home, pt will need HHPT & 24hr(A) at D/C  CM to follow  P: Will continue PT per POC        Carmina Hart, PT

## 2018-09-07 NOTE — PHYSICIAN ADVISOR
Current patient class: Observation  The patient is currently on Hospital Day: 3 at [facility]        The patient was admitted to the hospital  on N/A at N/A for the following diagnosis:  Flank pain [R10 9]     After review of the relevant documentation, labs, vital signs and test results, the patient is most appropriate for OBSERVATION STATUS  Rationale is as follows: The patient is a 58-year-old male with history of secondary progressive multiple sclerosis presented to the emergency room on 09/05/2018 after a fall at home due to leg weakness  Imaging of his cervical spine, brain and chest abdomen and pelvis did not reveal any acute trauma  Patient has been treated with oral and topical pain medications  His weakness and pain are improving  He has been evaluated by Neurology and Physical therapy  Patient is appropriate for observation level of care  The patients vitals on arrival were ED Triage Vitals [09/05/18 2054]   Temperature Pulse Respirations Blood Pressure SpO2   99 8 °F (37 7 °C) 102 22 137/65 97 %      Temp Source Heart Rate Source Patient Position - Orthostatic VS BP Location FiO2 (%)   Oral Monitor Lying Left arm --      Pain Score       8           Past Medical History:   Diagnosis Date    ANANDA (acute kidney injury) ([facility] [address] ) 1/23/2016    Arthritis     lower back    Depression     Multiple sclerosis (HCC)     Psychiatric disorder     depression    Seizures (HCC)     pseudoseizures     History reviewed  No pertinent surgical history          Consults have been placed to:   IP CONSULT TO CASE MANAGEMENT  IP CONSULT TO NEUROLOGY    Vitals:    09/06/18 1450 09/06/18 2328 09/07/18 0723 09/07/18 1434   BP: 119/73 102/76 115/72 121/77   BP Location: Left arm Left arm Left arm Left arm   Pulse: 76 60 62 96   Resp: 17 17 18 18   Temp: (!) 96 6 °F (35 9 °C) 98 4 °F (36 9 °C) (!) 96 8 °F (36 °C) 98 1 °F (36 7 °C)   TempSrc: Temporal Temporal Tympanic Tympanic   SpO2: 93% 96% 90% 94%   Weight:       Height:           Most recent labs:    Recent Labs      09/05/18   2146   09/06/18   0552   WBC  10 30*   --   7 03   HGB  12 7   < >  11 5*   HCT  39 3   < >  35 4*   PLT  243   --   214   K  4 2   --   4 4   NA  140   --   141   CALCIUM  9 9   --   9 4   BUN  28*   --   25   CREATININE  1 22   --   1 24   AST  38   --    --    ALT  43   --    --    ALKPHOS  97   --    --     < > = values in this interval not displayed         Scheduled Meds:  Current Facility-Administered Medications:  acetaminophen 650 mg Oral Q6H PRN Cirilo [place], SIDDHARTHNP   baclofen 10 mg Oral HS HANK Gutierrez   calcium carbonate-vitamin D 1 tablet Oral Daily With Breakfast Cirilo Andrade, HANK   celecoxib 200 mg Oral BID Cirilo [place], HANK   citalopram 40 mg Oral Daily Cirilo [place], HANK   clonazePAM 1 5 mg Oral HS Cirilo [place], HANK   Dalfampridine ER 1 tablet Oral Q12H HANK Barclay   [START ON 9/8/2018] enoxaparin 40 mg Subcutaneous Q24H [facility] Samir Sampson MD   gabapentin 300 mg Oral HS CiriloHANK Foster   Interferon Beta-1a 44 mcg Injection Once per day on Mon Wed Fri Cirilo Andrade, HANK   levETIRAcetam 500 mg Oral BID Cirilo [place], HANK   lidocaine 1 patch Transdermal Daily Cirilotrinidad Andrade, HANK   multivitamin-minerals 1 tablet Oral Daily Cirilo [place], HANK   oxyCODONE 20 mg Oral BID Cirilo [place], HANK   saccharomyces boulardii 250 mg Oral BID Cirilo [place], HANK   tiZANidine 2 mg Oral BID HANK Gutierrez     Continuous Infusions:   PRN Meds:   acetaminophen    Surgical procedures (if appropriate):

## 2018-09-07 NOTE — PLAN OF CARE
Problem: PHYSICAL THERAPY ADULT  Goal: Performs mobility at highest level of function for planned discharge setting  See evaluation for individualized goals  Treatment/Interventions: Functional transfer training, LE strengthening/ROM, Elevations, Therapeutic exercise, Endurance training, Patient/family training, Bed mobility, Gait training, Spoke to nursing, Spoke to case management  Equipment Recommended: Hassan Shone (pt has DME at home)       See flowsheet documentation for full assessment, interventions and recommendations  Outcome: Progressing  Prognosis: Good  Problem List: Decreased strength, Decreased endurance, Impaired balance, Decreased mobility, Pain  Assessment: Pt  70 y  o male w/ MS admitted after Fall down stairs w/ c/o neck pain & BLE weakness  No fx identified  Pt referred to PT for mobility assessment & D/C planning w/ orders of ambulate and up w/ assistance  PTA, pt reports being fairly I w/ rollator  On eval, pt demonstrate dec mobility, balance, endurance & amb 2* to deconditioning  Pt require S for bed mobility & minAx1 for transfers & amb w/ RW for most functional mobility + cues for techniques  Gait deviations as above, slow w/ dec foot clearance but no gross LOB noted  Dec amb tolerance 2* to fatigue  No dizziness reported t/o session  Nsg staff most recent vital signs as follows: /72 (BP Location: Left arm)   Pulse 62   Temp (!) 96 8 °F (36 °C) (Tympanic)   Resp 18   Ht 5' 6" (1 676 m)   Wt 64 8 kg (142 lb 13 7 oz)   SpO2 90%   BMI 23 06 kg/m²   At end of session, pt tolerated OOB in chair w/o issues, call bell & phone in reach  Pt admits that he is functioning below his baseline hence will continue skilled PT to improve function & safety  At this time, due to above mentioned deficits, inaccesible home & high risk for falls, pt will benefit from inpt rehab at D/C  However, pt prefers to return home  If home, pt will need HHPT & 24hr(A) + need to achieve PT goals prior to D/C home  CM to follow  Nsg staff to continue to mobilized pt (OOB in chair for all meals & ambulate in room/unit) as tolerated to prevent further decline in function  Nsg notified  Barriers to Discharge: Inaccessible home environment  Barriers to Discharge Comments: multiple steps   Recommendation: Short-term skilled PT, Home PT, 24 hour supervision/assist, Home with family support (pt declined inpt rehab; HHPT & 24hr(A) if home)     PT - OK to Discharge:  (yes to inpt rehab; no to home)    PT TREATMENT NOTE:     TIME IN: 11:41  TIME OUT: 12:05  TOTAL TIME: 24mins     S: Pt agreeable to thera ex & to stay OOB in recliner  O: Sit<>stand: minAx1; Amb w/ RW approx  2'x1 w/ minAx1  Pt performed seated BLE thera ex (ankle pumps, LAQs, hip flexion; hip abduction/adduction) [id number], AROM  A: Pt continue to require minAx1 for transfers & amb + cues for techniques  Pt tolerated above mentioned thera  ex well + rest periods in between exercises 2* to fatigue  Pt c/o fatigue at end of session w/ pt requesting to get back in bed  But pt remain OOB in recliner w/ BLE elevated after encouragement  Discussed PT POC, tx intervention & D/C rec w/ good understanding  Discussed benefits of inpt rehab but pt still prefers to return home at D/C  If home, pt will need HHPT & 24hr(A) at D/C  CM to follow  P: Will continue PT per POC  See flowsheet documentation for full assessment

## 2018-09-08 PROCEDURE — 99225 PR SBSQ OBSERVATION CARE/DAY 25 MINUTES: CPT | Performed by: INTERNAL MEDICINE

## 2018-09-08 RX ORDER — TIZANIDINE 4 MG/1
2 TABLET ORAL 2 TIMES DAILY PRN
Status: DISCONTINUED | OUTPATIENT
Start: 2018-09-08 | End: 2018-09-09

## 2018-09-08 RX ADMIN — LEVETIRACETAM 500 MG: 500 TABLET ORAL at 09:29

## 2018-09-08 RX ADMIN — TIZANIDINE 2 MG: 4 TABLET ORAL at 09:29

## 2018-09-08 RX ADMIN — DALFAMPRIDINE 10 MG: 10 TABLET, FILM COATED, EXTENDED RELEASE ORAL at 00:22

## 2018-09-08 RX ADMIN — CALCIUM CARBONATE 500 MG (1,250 MG)-VITAMIN D3 200 UNIT TABLET 1 TABLET: at 07:35

## 2018-09-08 RX ADMIN — Medication 250 MG: at 17:06

## 2018-09-08 RX ADMIN — CELECOXIB 200 MG: 200 CAPSULE ORAL at 09:29

## 2018-09-08 RX ADMIN — Medication 1 TABLET: at 09:29

## 2018-09-08 RX ADMIN — ENOXAPARIN SODIUM 40 MG: 40 INJECTION SUBCUTANEOUS at 09:29

## 2018-09-08 RX ADMIN — CLONAZEPAM 1.5 MG: 1 TABLET ORAL at 19:02

## 2018-09-08 RX ADMIN — LEVETIRACETAM 500 MG: 500 TABLET ORAL at 17:06

## 2018-09-08 RX ADMIN — DALFAMPRIDINE 10 MG: 10 TABLET, FILM COATED, EXTENDED RELEASE ORAL at 13:14

## 2018-09-08 RX ADMIN — OXYCODONE HYDROCHLORIDE 20 MG: 20 TABLET, FILM COATED, EXTENDED RELEASE ORAL at 21:57

## 2018-09-08 RX ADMIN — DALFAMPRIDINE 10 MG: 10 TABLET, FILM COATED, EXTENDED RELEASE ORAL at 23:33

## 2018-09-08 RX ADMIN — Medication 250 MG: at 09:29

## 2018-09-08 RX ADMIN — GABAPENTIN 300 MG: 300 CAPSULE ORAL at 19:02

## 2018-09-08 RX ADMIN — CELECOXIB 200 MG: 200 CAPSULE ORAL at 17:06

## 2018-09-08 RX ADMIN — OXYCODONE HYDROCHLORIDE 20 MG: 20 TABLET, FILM COATED, EXTENDED RELEASE ORAL at 09:28

## 2018-09-08 RX ADMIN — CITALOPRAM HYDROBROMIDE 40 MG: 20 TABLET ORAL at 09:29

## 2018-09-08 RX ADMIN — TIZANIDINE 2 MG: 4 TABLET ORAL at 18:43

## 2018-09-08 NOTE — PROGRESS NOTES
Progress Note - Jackelyn Major 1955, 70 y o  male MRN: [id number]    Unit/Bed#: [address] 205-02 Encounter: [id number]    Primary Care Provider: Katarina Kuhn DO   Date and time admitted to hospital: 2018  8:47 PM        * Fall down stairs   Assessment & Plan    With persistent ambulatory dysfunction  Mechanical fall on top underlying secondary progressive multiple sclerosis  He continues to be weak and not at baseline  Short-term rehab recommended  Secondary progressive multiple sclerosis ([facility] [address] )   Assessment & Plan    Continue Ampyra and Rebiff for MS  The patient requested the tizanidine to be reduced to as needed since he only uses this when he gets spasms  Decrease tizanidine 2 twice a day as needed        Seizure disorder ([facility] [address] )   Assessment & Plan    · On Keppra, continue  · Seizure precautions        Oropharyngeal dysphagia   Assessment & Plan    Continue mechanical diet with thin liquid        Depression   Assessment & Plan    · Continue Celexa          VTE Pharmacologic Prophylaxis:   Pharmacologic: Enoxaparin (Lovenox)  Mechanical VTE Prophylaxis in Place: No    Time Spent for Care: 20 minutes  More than 50% of total time spent on counseling and coordination of care as described above  Current Length of Stay: 0 day(s)    Current Patient Status: Observation   Certification Statement: The patient, admitted on an observation basis, will now require > 2 midnight hospital stay due to Persistent weakness on top of progressive MS, fall risk, needing short-term rehab    Discharge Plan:  Short-term rehab    Code Status: Level 3 - DNAR and DNI      Subjective: The patient feels that he is very weak and not his baseline  He is usually able to maneuver independently using a roller walker  He required assistance just by going from bed to bedside commode    His neck is still stiff and painful    Objective:     Vitals:   Temp (24hrs), Av 4 °F (36 9 °C), Min:98 °F (36 7 °C), Max:98 9 °F (37 2 °C)    HR:  [71-80] 71  Resp:  [18-20] 20  BP: (110-136)/(73-78) 136/75  SpO2:  [93 %-96 %] 96 %  Body mass index is 23 06 kg/m²  Input and Output Summary (last 24 hours): Intake/Output Summary (Last 24 hours) at 09/08/18 1717  Last data filed at 09/08/18 1201   Gross per 24 hour   Intake                0 ml   Output             1050 ml   Net            -1050 ml       Physical Exam:     Physical Exam   Constitutional: He is oriented to person, place, and time  He appears well-developed and well-nourished  HENT:   Head: Normocephalic and atraumatic  Eyes: No scleral icterus  Neck: No JVD present  Cardiovascular: Regular rhythm  Exam reveals no friction rub  No murmur heard  Pulmonary/Chest: Effort normal    Abdominal: Soft  Bowel sounds are normal  He exhibits no distension  There is no tenderness  Musculoskeletal: He exhibits no edema  Neurological: He is alert and oriented to person, place, and time  Bradykinesia   Skin: Skin is warm and dry  Psychiatric: He has a normal mood and affect  Additional Data:     Labs:      Results from last 7 days  Lab Units 09/06/18  0552   WBC Thousand/uL 7 03   HEMOGLOBIN g/dL 11 5*   HEMATOCRIT % 35 4*   PLATELETS Thousands/uL 214   NEUTROS PCT % 78*   LYMPHS PCT % 13*   MONOS PCT % 9   EOS PCT % 0       Results from last 7 days  Lab Units 09/06/18  0552 09/05/18  2154 09/05/18  2146   SODIUM mmol/L 141  --  140   POTASSIUM mmol/L 4 4  --  4 2   CHLORIDE mmol/L 105  --  105   CO2 mmol/L 30  --  27   BUN mg/dL 25  --  28*   CREATININE mg/dL 1 24  --  1 22   CALCIUM mg/dL 9 4  --  9 9   ALK PHOS U/L  --   --  97   ALT U/L  --   --  43   AST U/L  --   --  38   GLUCOSE, ISTAT mg/dl  --  154*  --                      * I Have Reviewed All Lab Data Listed Above    * Additional Pertinent Lab Tests Reviewed: No New Labs Available For Today    Imaging:    Imaging Reports Reviewed Today Include:  None  Imaging Personally Reviewed by Myself Includes:  None    Recent Cultures (last 7 days):           Last 24 Hours Medication List:     Current Facility-Administered Medications:  acetaminophen 650 mg Oral Q6H PRN Chrissy Aguilar, CRNP   calcium carbonate-vitamin D 1 tablet Oral Daily With Breakfast Chrissy Mort, CRNP   celecoxib 200 mg Oral BID Chrissy Mort, CRNP   citalopram 40 mg Oral Daily Chrissy Mort, CRNP   clonazePAM 1 5 mg Oral HS Chrissy Mort, CRNP   Dalfampridine ER 1 tablet Oral Q12H Chrissy Mort, CRNP   enoxaparin 40 mg Subcutaneous Q24H [facility] Tayler Beck MD   gabapentin 300 mg Oral HS Chrissy Mort, CRNP   Interferon Beta-1a 44 mcg Injection Once per day on Mon Wed Fri [place] Mort, CRNP   levETIRAcetam 500 mg Oral BID Chrissy Mort, CRNP   lidocaine 1 patch Transdermal Daily Chrissy Mort, CRNP   multivitamin-minerals 1 tablet Oral Daily [place] Mort, CRNP   oxyCODONE 20 mg Oral BID Chrissy Mort, CRNP   saccharomyces boulardii 250 mg Oral BID Chrissy Mort, CRNP   tiZANidine 2 mg Oral BID Julia Alla, CRNP   tiZANidine 2 mg Oral HS PRN Noemy Jj MD        Today, Patient Was Seen By: Tayler Beck MD    ** Please Note: Dictation voice to text software may have been used in the creation of this document   **

## 2018-09-08 NOTE — PLAN OF CARE
DISCHARGE PLANNING     Discharge to home or other facility with appropriate resources Progressing        INFECTION - ADULT     Absence or prevention of progression during hospitalization Progressing        Knowledge Deficit     Patient/family/caregiver demonstrates understanding of disease process, treatment plan, medications, and discharge instructions Progressing        MUSCULOSKELETAL - ADULT     Maintain or return mobility to safest level of function Progressing        PAIN - ADULT     Verbalizes/displays adequate comfort level or baseline comfort level Progressing        Potential for Falls     Patient will remain free of falls Progressing        Prexisting or High Potential for Compromised Skin Integrity     Skin integrity is maintained or improved Progressing        SAFETY ADULT     Maintain or return to baseline ADL function Progressing     Maintain or return mobility status to optimal level Progressing

## 2018-09-08 NOTE — CASE MANAGEMENT
Thank you,  [address] Utilization Review Department  Phone: [phone]; Fax [phone]  ATTENTION: Please call with any questions or concerns to [phone]  and carefully follow the prompts so that you are directed to the right person  Send all requests for admission clinical reviews, approved or denied determinations and any other requests to fax [phone]  All voicemails are confidential      Continued Stay Review    Date: 9/7/2018    Vital Signs: /73 (BP Location: Left arm)   Pulse 73   Temp 98 2 °F (36 8 °C) (Temporal)   Resp 20   Ht 5' 6" (1 676 m)   Wt 64 8 kg (142 lb 13 7 oz)   SpO2 94%   BMI 23 06 kg/m²     Medications:   Scheduled Meds:  Current Facility-Administered Medications:         baclofen 10 mg Oral HS    calcium carbonate-vitamin D 1 tablet Oral Daily With Breakfast    celecoxib 200 mg Oral BID    citalopram 40 mg Oral Daily    clonazePAM 1 5 mg Oral HS    Dalfampridine ER 1 tablet Oral Q12H    gabapentin 300 mg Oral HS    Interferon Beta-1a 44 mcg Injection Once per day on Mon Wed Fri    levETIRAcetam 500 mg Oral BID    lidocaine 1 patch Transdermal Daily    multivitamin-minerals 1 tablet Oral Daily    oxyCODONE 20 mg Oral BID    saccharomyces boulardii 250 mg Oral BID    tiZANidine 2 mg Oral BID        PRN Meds:   Acetaminophen x 1 9/6    Baclofen x 1 9/6    tiZANidine x 2  9/6  And x 1 9/7    Abnormal Labs/Diagnostic Results:   Urine:  1+ protein,  occ bacteria    Age/Sex: 70 y o  male     Assessment/Plan:   Per Neuro:   1  Cervical strain, contusions post fall down steps  2  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, ataxia, intermittent diplopia, sensory alterations in the lower extremities   Plan:  Change baclofen to 10 mg HS daily   Change Zanaflex 2 mg b i d  for 5-10 days, then can resume p r n  schedule  Continue Rebif, Ampyra, Keppra without change    Discharge Plan: To be determined   PT recommending [facility]

## 2018-09-08 NOTE — ASSESSMENT & PLAN NOTE
With persistent ambulatory dysfunction  Mechanical fall on top underlying secondary progressive multiple sclerosis  He continues to be weak and not at baseline  Short-term rehab recommended

## 2018-09-08 NOTE — ASSESSMENT & PLAN NOTE
Continue Ampyra and Rebiff for MS  The patient requested the tizanidine to be reduced to as needed since he only uses this when he gets spasms  Decrease tizanidine 2 twice a day as needed

## 2018-09-09 LAB
ARTERIAL PATENCY WRIST A: YES
BASE EXCESS BLDA CALC-SCNC: 1.9 MMOL/L
GLUCOSE SERPL-MCNC: 94 MG/DL (ref 65–140)
HCO3 BLDA-SCNC: 26.8 MMOL/L (ref 22–28)
NON VENT ROOM AIR: 21 %
O2 CT BLDA-SCNC: 15.6 ML/DL (ref 16–23)
OXYHGB MFR BLDA: 93 % (ref 94–97)
PCO2 BLDA: 43 MM HG (ref 36–44)
PH BLDA: 7.41 [PH] (ref 7.35–7.45)
PO2 BLDA: 72.4 MM HG (ref 75–129)
SPECIMEN SOURCE: ABNORMAL

## 2018-09-09 PROCEDURE — 99225 PR SBSQ OBSERVATION CARE/DAY 25 MINUTES: CPT | Performed by: INTERNAL MEDICINE

## 2018-09-09 PROCEDURE — 82948 REAGENT STRIP/BLOOD GLUCOSE: CPT

## 2018-09-09 PROCEDURE — 36600 WITHDRAWAL OF ARTERIAL BLOOD: CPT

## 2018-09-09 PROCEDURE — 82805 BLOOD GASES W/O2 SATURATION: CPT | Performed by: NURSE PRACTITIONER

## 2018-09-09 RX ORDER — TIZANIDINE 4 MG/1
2 TABLET ORAL
Status: DISCONTINUED | OUTPATIENT
Start: 2018-09-09 | End: 2018-09-11 | Stop reason: HOSPADM

## 2018-09-09 RX ADMIN — ENOXAPARIN SODIUM 40 MG: 40 INJECTION SUBCUTANEOUS at 08:43

## 2018-09-09 RX ADMIN — OXYCODONE HYDROCHLORIDE 20 MG: 20 TABLET, FILM COATED, EXTENDED RELEASE ORAL at 08:45

## 2018-09-09 RX ADMIN — TIZANIDINE 2 MG: 4 TABLET ORAL at 23:50

## 2018-09-09 RX ADMIN — Medication 250 MG: at 17:03

## 2018-09-09 RX ADMIN — Medication 250 MG: at 08:45

## 2018-09-09 RX ADMIN — CITALOPRAM HYDROBROMIDE 40 MG: 20 TABLET ORAL at 08:45

## 2018-09-09 RX ADMIN — CLONAZEPAM 1.5 MG: 1 TABLET ORAL at 19:28

## 2018-09-09 RX ADMIN — LEVETIRACETAM 500 MG: 500 TABLET ORAL at 08:45

## 2018-09-09 RX ADMIN — GABAPENTIN 300 MG: 300 CAPSULE ORAL at 19:28

## 2018-09-09 RX ADMIN — DALFAMPRIDINE 10 MG: 10 TABLET, FILM COATED, EXTENDED RELEASE ORAL at 12:51

## 2018-09-09 RX ADMIN — CELECOXIB 200 MG: 200 CAPSULE ORAL at 17:04

## 2018-09-09 RX ADMIN — LEVETIRACETAM 500 MG: 500 TABLET ORAL at 17:04

## 2018-09-09 RX ADMIN — CELECOXIB 200 MG: 200 CAPSULE ORAL at 08:45

## 2018-09-09 NOTE — PROGRESS NOTES
Progress Note - Zarina Ramon 1955, 70 y o  male MRN: [id number]    Unit/Bed#: [address] 205-02 Encounter: [id number]    Primary Care Provider: Mulugeta Jade DO   Date and time admitted to hospital: 9/5/2018  8:47 PM        * Fall down stairs   Assessment & Plan    With persistent ambulatory dysfunction  Mechanical fall on top underlying secondary progressive multiple sclerosis  He continues to be weak, requiring 2 person assist just to go to the commode  He remains a fall risk and definitely not at his baseline  Short-term rehab recommended  Secondary progressive multiple sclerosis ([facility] [address] )   Assessment & Plan    Continue Ampyra and Rebiff for MS  Decrease tizanidine to at bedtime  This is making him drowsy during the day        Seizure disorder [facility])   Assessment & Plan    · On Keppra, continue  · Seizure precautions        Oropharyngeal dysphagia   Assessment & Plan    Due to progressive MS  Continue mechanical diet with thin liquid        Depression   Assessment & Plan    · Continue Celexa            VTE Pharmacologic Prophylaxis:   Pharmacologic: Enoxaparin (Lovenox)  Mechanical VTE Prophylaxis in Place: No    Patient Centered Rounds: Discussed with his nurse    Education and Discussions with Family / Patient: Spoke with wife Polo Boast and gave update    Time Spent for Care: 20 minutes  More than 50% of total time spent on counseling and coordination of care as described above  Current Length of Stay: 0 day(s)    Current Patient Status: Observation   Certification e Statement: The patient, admitted on an observation basis, will now require > 2 midnight hospital stay due to  ambulatory dysfunction and need for short-term rehab    Discharge Plan:  Short-term rehab    Code Status: Level 3 - DNAR and DNI      Subjective:   Still weak requiring 2 person assist on ambulation  This is not his baseline according to his wife    He requested that tizanidine to only be given at light and not during the day    Objective:     Vitals:   Temp (24hrs), Av 3 °F (36 8 °C), Min:98 °F (36 7 °C), Max:98 5 °F (36 9 °C)    HR:  [71-88] 88  Resp:  [18-20] 18  BP: (112-136)/(75-81) 117/81  SpO2:  [95 %-96 %] 95 %  Body mass index is 23 06 kg/m²  Input and Output Summary (last 24 hours): Intake/Output Summary (Last 24 hours) at 18 1045  Last data filed at 18 0631   Gross per 24 hour   Intake                0 ml   Output              800 ml   Net             -800 ml       Physical Exam:     Physical Exam   Constitutional: He appears well-developed and well-nourished  HENT:   Head: Normocephalic and atraumatic  Eyes: No scleral icterus  Neck: No JVD present  Cardiovascular: Regular rhythm  Exam reveals no friction rub  No murmur heard  Pulmonary/Chest: Effort normal  No respiratory distress  He has no wheezes  Abdominal: Soft  He exhibits no distension  There is no tenderness  Musculoskeletal: He exhibits no edema  Neurological: He is alert  Flat affect  Bradykinetic   Skin: Skin is warm and dry  Psychiatric: He has a normal mood and affect  Additional Data:     Labs:      Results from last 7 days  Lab Units 18  0552   WBC Thousand/uL 7 03   HEMOGLOBIN g/dL 11 5*   HEMATOCRIT % 35 4*   PLATELETS Thousands/uL 214   NEUTROS PCT % 78*   LYMPHS PCT % 13*   MONOS PCT % 9   EOS PCT % 0       Results from last 7 days  Lab Units 18  0552 18  2154 18  2146   SODIUM mmol/L 141  --  140   POTASSIUM mmol/L 4 4  --  4 2   CHLORIDE mmol/L 105  --  105   CO2 mmol/L 30  --  27   BUN mg/dL 25  --  28*   CREATININE mg/dL 1 24  --  1 22   CALCIUM mg/dL 9 4  --  9 9   ALK PHOS U/L  --   --  97   ALT U/L  --   --  43   AST U/L  --   --  38   GLUCOSE, ISTAT mg/dl  --  154*  --                      * I Have Reviewed All Lab Data Listed Above    * Additional Pertinent Lab Tests Reviewed: No New Labs Available For Today    Imaging:    Imaging Reports Reviewed Today Include: No new imaging  Recent Cultures (last 7 days):           Last 24 Hours Medication List:     Current Facility-Administered Medications:  acetaminophen 650 mg Oral Q6H PRN Ollie Mort, CRNP   celecoxib 200 mg Oral BID Chrissy Mort, CRNP   citalopram 40 mg Oral Daily Chrissy Mort, CRNP   clonazePAM 1 5 mg Oral HS Ollie Mort, CRNP   Dalfampridine ER 1 tablet Oral Q12H Ollie Mort, CRNP   enoxaparin 40 mg Subcutaneous Q24H [facility] Tayler Beck MD   gabapentin 300 mg Oral HS Ollie Mort, CRNP   Interferon Beta-1a 44 mcg Injection Once per day on Mon Wed Fri Ollie Mort, CRNP   levETIRAcetam 500 mg Oral BID Ollie Mort, CRNP   lidocaine 1 patch Transdermal Daily Chrissy Mort, CRNP   oxyCODONE 20 mg Oral BID Chrissy Mort, CRNP   saccharomyces boulardii 250 mg Oral BID Chrissy Mort, CRNP   tiZANidine 2 mg Oral BID PRN Tayler Beck MD        Today, Patient Was Seen By: Tayler Beck MD    ** Please Note: Dictation voice to text software may have been used in the creation of this document   **

## 2018-09-09 NOTE — CASE MANAGEMENT
Thank you,  [address]  Utilization Review Department  Phone: [phone]; Fax [phone]  ATTENTION: Please call with any questions or concerns to [phone]  and carefully follow the prompts so that you are directed to the right person  Send all requests for admission clinical reviews, approved or denied determinations and any other requests to fax [phone]  All voicemails are confidential    Continued Stay Review    Date: 9/8/18    Vital Signs: /81 (BP Location: Left arm)   Pulse 88   Temp 98 5 °F (36 9 °C) (Temporal)   Resp 18   Ht 5' 6" (1 676 m)   Wt 64 8 kg (142 lb 13 7 oz)   SpO2 95%   BMI 23 06 kg/m²     Medications:   Scheduled Meds:   Current Facility-Administered Medications:  acetaminophen 650 mg Oral Q6H PRN Nikos Prey, CRNP   celecoxib 200 mg Oral BID Nikos Prey, CRNP   citalopram 40 mg Oral Daily Nikos Prey, CRNP   clonazePAM 1 5 mg Oral HS Nikos Prey, CRNP   Dalfampridine ER 1 tablet Oral Q12H Nikos Prey, CRNP   enoxaparin 40 mg Subcutaneous Q24H [address] Néstor Coburn MD   gabapentin 300 mg Oral HS Nikos Prey, CRNP   Interferon Beta-1a 44 mcg Injection Once per day on Mon Wed Fri Nikos Prey, CRNP   levETIRAcetam 500 mg Oral BID Nikos Prey, CRNP   lidocaine 1 patch Transdermal Daily Nikos Prey, CRNP   oxyCODONE 20 mg Oral BID Nikos Prey, CRNP   saccharomyces boulardii 250 mg Oral BID Nikos Prey, CRNP   tiZANidine 2 mg Oral BID PRN Néstor Coburn MD     Continuous Infusions:    PRN Meds:   acetaminophen    tiZANidine 9/8 x 1    Abnormal Labs/Diagnostic Results:   hgb 11 5  hct 35 4  Bun 28  Glu 154    Age/Sex: 70 y o  male     Assessment/Plan: 69 yo with persistent ambulatory dysfunction and weakness s/p fall down stairs  Need for short term rehab  Hx of MS, continue Ampyra and Rebiff, decrease  tizanidine 2 twice a day as needed for spasms   Seizure disorder, continue Keppra  Oropharyngeal dysphagia continue mechanical diet      The patient, admitted on an observation basis, will now require > 2 midnight hospital stay due to Persistent weakness on top of progressive MS, fall risk, needing short-term rehab     Discharge Plan: to be determined

## 2018-09-09 NOTE — CASE MANAGEMENT
Thank you,  [address]  Utilization Review Department  Phone: [phone]; Fax [phone]  ATTENTION: Please call with any questions or concerns to [phone]  and carefully follow the prompts so that you are directed to the right person  Send all requests for admission clinical reviews, approved or denied determinations and any other requests to fax [phone]  All voicemails are confidential    Continued Stay Review    Date: 9/9/18    Vital Signs: /81 (BP Location: Left arm)   Pulse 88   Temp 98 5 °F (36 9 °C) (Temporal)   Resp 18   Ht 5' 6" (1 676 m)   Wt 64 8 kg (142 lb 13 7 oz)   SpO2 95%   BMI 23 06 kg/m²     Medications:   Scheduled Meds:   Current Facility-Administered Medications:  acetaminophen 650 mg Oral Q6H PRN Mills Silk, CRNP   celecoxib 200 mg Oral BID Virgil Silk, CRNP   citalopram 40 mg Oral Daily Virgil Silk, CRNP   clonazePAM 1 5 mg Oral HS Virgil Silk, CRNP   Dalfampridine ER 1 tablet Oral Q12H Virgil Silk, CRNP   enoxaparin 40 mg Subcutaneous Q24H [facility] Miranda Rawls MD   gabapentin 300 mg Oral HS Mills Silk, CRNP   Interferon Beta-1a 44 mcg Injection Once per day on Mon Wed Fri [facility], CRNP   levETIRAcetam 500 mg Oral BID Virgil Silk, CRNP   lidocaine 1 patch Transdermal Daily Mills Silk, CRNP   oxyCODONE 20 mg Oral BID Mills Silk, CRNP   saccharomyces boulardii 250 mg Oral BID Virgil Silk, CRNP   tiZANidine 2 mg Oral HS Miranda Rawls MD     Continuous Infusions:    PRN Meds:   acetaminophen    Abnormal Labs/Diagnostic Results:   hgb 11 5  hct 35 4  Bun 28    Age/Sex: 70 y o  male     Assessment/Plan: 71 yo male with persistent ambulatory dysfunction and weakness s/t MA    Certification e Statement: The patient, admitted on an observation basis, will now require > 2 midnight hospital stay due to  ambulatory dysfunction and need for short-term rehab     Discharge Plan: to be determined

## 2018-09-09 NOTE — ASSESSMENT & PLAN NOTE
With persistent ambulatory dysfunction  Mechanical fall on top underlying secondary progressive multiple sclerosis  He continues to be weak, requiring 2 person assist just to go to the commode  He remains a fall risk and definitely not at his baseline  Short-term rehab recommended

## 2018-09-09 NOTE — ASSESSMENT & PLAN NOTE
Continue Ampyra and Rebiff for MS  Decrease tizanidine to at bedtime    This is making him drowsy during the day

## 2018-09-10 PROCEDURE — 97167 OT EVAL HIGH COMPLEX 60 MIN: CPT

## 2018-09-10 PROCEDURE — 97110 THERAPEUTIC EXERCISES: CPT

## 2018-09-10 PROCEDURE — 97116 GAIT TRAINING THERAPY: CPT

## 2018-09-10 PROCEDURE — G8988 SELF CARE GOAL STATUS: HCPCS

## 2018-09-10 PROCEDURE — G8987 SELF CARE CURRENT STATUS: HCPCS

## 2018-09-10 PROCEDURE — 99225 PR SBSQ OBSERVATION CARE/DAY 25 MINUTES: CPT | Performed by: INTERNAL MEDICINE

## 2018-09-10 RX ADMIN — CELECOXIB 200 MG: 200 CAPSULE ORAL at 08:47

## 2018-09-10 RX ADMIN — DALFAMPRIDINE 10 MG: 10 TABLET, FILM COATED, EXTENDED RELEASE ORAL at 12:08

## 2018-09-10 RX ADMIN — Medication 250 MG: at 08:47

## 2018-09-10 RX ADMIN — OXYCODONE HYDROCHLORIDE 20 MG: 20 TABLET, FILM COATED, EXTENDED RELEASE ORAL at 00:30

## 2018-09-10 RX ADMIN — CITALOPRAM HYDROBROMIDE 40 MG: 20 TABLET ORAL at 08:47

## 2018-09-10 RX ADMIN — GABAPENTIN 300 MG: 300 CAPSULE ORAL at 20:35

## 2018-09-10 RX ADMIN — CELECOXIB 200 MG: 200 CAPSULE ORAL at 17:26

## 2018-09-10 RX ADMIN — LEVETIRACETAM 500 MG: 500 TABLET ORAL at 08:47

## 2018-09-10 RX ADMIN — Medication 250 MG: at 17:26

## 2018-09-10 RX ADMIN — OXYCODONE HYDROCHLORIDE 20 MG: 20 TABLET, FILM COATED, EXTENDED RELEASE ORAL at 08:47

## 2018-09-10 RX ADMIN — OXYCODONE HYDROCHLORIDE 20 MG: 20 TABLET, FILM COATED, EXTENDED RELEASE ORAL at 20:34

## 2018-09-10 RX ADMIN — LEVETIRACETAM 500 MG: 500 TABLET ORAL at 17:26

## 2018-09-10 RX ADMIN — CLONAZEPAM 1.5 MG: 1 TABLET ORAL at 20:34

## 2018-09-10 RX ADMIN — ENOXAPARIN SODIUM 40 MG: 40 INJECTION SUBCUTANEOUS at 08:47

## 2018-09-10 RX ADMIN — DALFAMPRIDINE 10 MG: 10 TABLET, FILM COATED, EXTENDED RELEASE ORAL at 00:17

## 2018-09-10 NOTE — PROGRESS NOTES
Progress Note - Sulma Garcia 1955, 70 y o  male MRN: [id number]    Unit/Bed#: [address] 205-02 Encounter: [id number]    Primary Care Provider: Mariel Chan DO   Date and time admitted to hospital: 9/5/2018  8:47 PM        * Fall down stairs   Assessment & Plan    With persistent ambulatory dysfunction  Mechanical fall on top underlying secondary progressive multiple sclerosis  He continues to be weak, requiring 2 person assist just to go to the commode  He remains a fall risk and definitely not at his baseline  Short-term rehab placement pending          Secondary progressive multiple sclerosis ([facility] [address] )   Assessment & Plan    Continue Ampyra and Rebiff for MS  Tizanidine decreased to at bedtime  Report of "change in mental status" last night noted  Currently alert and at baseline  Suspect that episode of "change in mental status" was transient confusion/disorientation when he was awoken from a deep sleep (after receiving nighttime meds)  Low suspicion for seizure/delirium/stroke  Stable for transfer to rehab when available        Seizure disorder [facility])   Assessment & Plan    · On Keppra, continue  · Seizure precautions        Oropharyngeal dysphagia   Assessment & Plan    Due to progressive MS  Continue mechanical diet with thin liquid        Depression   Assessment & Plan    · Continue Celexa          VTE Pharmacologic Prophylaxis:   Pharmacologic: Enoxaparin (Lovenox)  Mechanical VTE Prophylaxis in Place: No    Discussions with Specialists or Other Care Team Provider:  Discussed with case management    Education and Discussions with Family / Patient: Spoke with Julius Rivas and gave update    Time Spent for Care: 20 minutes  More than 50% of total time spent on counseling and coordination of care as described above      Current Length of Stay: 0 day(s)    Current Patient Status: Observation   Certification Statement: The patient, admitted on an observation basis, will now require > 2 midnight hospital stay due to weakness and ambulatory dysfunction    Discharge Plan: str when available    Code Status: Level 3 - DNAR and DNI    Subjective:   Patient does not recall an episode of confusion last night  He still admits feeling weak and not his baseline  Objective:     Vitals:   Temp (24hrs), Av 2 °F (36 8 °C), Min:96 5 °F (35 8 °C), Max:99 8 °F (37 7 °C)    HR:  [] 86  Resp:  [18] 18  BP: (118-134)/(73-89) 118/73  SpO2:  [94 %-96 %] 96 %  Body mass index is 23 06 kg/m²  Input and Output Summary (last 24 hours): Intake/Output Summary (Last 24 hours) at 09/10/18 1239  Last data filed at 09/10/18 1201   Gross per 24 hour   Intake              120 ml   Output             1300 ml   Net            -1180 ml       Physical Exam:     Physical Exam   Constitutional: He is oriented to person, place, and time  He appears well-developed  No distress  HENT:   Head: Normocephalic  Eyes: No scleral icterus  Neck: No JVD present  Cardiovascular: Regular rhythm  Exam reveals no friction rub  No murmur heard  Pulmonary/Chest: Effort normal  No respiratory distress  He has no wheezes  Abdominal: Soft  He exhibits no distension  There is no tenderness  Musculoskeletal: He exhibits no edema  Neurological: He is alert and oriented to person, place, and time  Bradykinetic  Flat affect  Rather slow speech   Skin: Skin is warm  He is not diaphoretic  Psychiatric: He has a normal mood and affect         Additional Data:     Labs:      Results from last 7 days  Lab Units 18  0552   WBC Thousand/uL 7 03   HEMOGLOBIN g/dL 11 5*   HEMATOCRIT % 35 4*   PLATELETS Thousands/uL 214   NEUTROS PCT % 78*   LYMPHS PCT % 13*   MONOS PCT % 9   EOS PCT % 0       Results from last 7 days  Lab Units 18  0552 18  2154 18  2146   SODIUM mmol/L 141  --  140   POTASSIUM mmol/L 4 4  --  4 2   CHLORIDE mmol/L 105  --  105   CO2 mmol/L 30  --  27   BUN mg/dL 25  --  28*   CREATININE mg/dL 1 24  --  1 22   CALCIUM mg/dL 9 4  --  9 9   ALK PHOS U/L  --   --  97   ALT U/L  --   --  43   AST U/L  --   --  38   GLUCOSE, ISTAT mg/dl  --  154*  --            Results from last 7 days  Lab Units 09/09/18  2224   POC GLUCOSE mg/dl 94             * I Have Reviewed All Lab Data Listed Above  * Additional Pertinent Lab Tests Reviewed: No New Labs Available For Today    Imaging:    Imaging Reports Reviewed Today Include:  No new imaging    Recent Cultures (last 7 days):           Last 24 Hours Medication List:     Current Facility-Administered Medications:  acetaminophen 650 mg Oral Q6H PRN Sun Wesley, CRNP   celecoxib 200 mg Oral BID Sun Wesley, CRNP   citalopram 40 mg Oral Daily Sun Wesley, CRNP   clonazePAM 1 5 mg Oral HS Sun Wesley, CRNP   Dalfampridine ER 1 tablet Oral Q12H Sun Wesley, CRNP   enoxaparin 40 mg Subcutaneous Q24H [address] Patricia Leonard MD   gabapentin 300 mg Oral HS Sun Wesley, CRNP   Interferon Beta-1a 44 mcg Injection Once per day on Mon Wed Fri Sun Wesley, CRNP   levETIRAcetam 500 mg Oral BID Sun Wesley, CRNP   lidocaine 1 patch Transdermal Daily Sun Wesley, CRNP   oxyCODONE 20 mg Oral BID Sun Wesley, CRNP   saccharomyces boulardii 250 mg Oral BID Dino Hernandeza, CRNP   tiZANidine 2 mg Oral HS Patricia Leonard MD        Today, Patient Was Seen By: Patricia Leonard MD    ** Please Note: Dictation voice to text software may have been used in the creation of this document   **

## 2018-09-10 NOTE — ASSESSMENT & PLAN NOTE
With persistent ambulatory dysfunction  Mechanical fall on top underlying secondary progressive multiple sclerosis  He continues to be weak, requiring 2 person assist just to go to the commode  He remains a fall risk and definitely not at his baseline    Short-term rehab placement pending

## 2018-09-10 NOTE — PHYSICIAN ADVISOR
Current patient class: Observation  The patient is currently on Hospital Day: 6 at [facility]        The patient was admitted to the hospital  on N/A at N/A for the following diagnosis:  Flank pain [R10 9]     After review of the relevant documentation, labs, vital signs and test results, the patient is most appropriate for OBSERVATION STATUS  Rationale is as follows:    Please refer to the previous physician advisor note  Since that time, the case has been reviewed by other physician advisors  The patient has multiple sclerosis which is secondary progressive, and other medical conditions which increased his risk of traumatic falls  The patient is still weak and requires two person assistance  He is not at baseline as far as his strength or ambulatory status  He has been awaiting a safe discharge plan to short-term rehabilitation  The patient remains hemodynamically stable  He has not required additional imaging since will was performed in the emergency department  The patient is also being managed with oral medication and has not required intravenous fluids or intravenous medication  At this time, I do not see indication for upgrade in status to inpatient level care  The patients vitals on arrival were ED Triage Vitals [09/05/18 2054]   Temperature Pulse Respirations Blood Pressure SpO2   99 8 °F (37 7 °C) 102 22 137/65 97 %      Temp Source Heart Rate Source Patient Position - Orthostatic VS BP Location FiO2 (%)   Oral Monitor Lying Left arm --      Pain Score       8           Past Medical History:   Diagnosis Date    ANANDA (acute kidney injury) ([facility] [address] ) 1/23/2016    Arthritis     lower back    Depression     Multiple sclerosis (HCC)     Psychiatric disorder     depression    Seizures (HCC)     pseudoseizures     History reviewed  No pertinent surgical history          Consults have been placed to:   IP CONSULT TO CASE MANAGEMENT  IP CONSULT TO NEUROLOGY    Vitals: 09/09/18 1446 09/09/18 2211 09/09/18 2224 09/10/18 0750   BP: 120/77 134/89 134/89 118/73   BP Location: Left arm Left arm Left arm Left arm   Pulse: 90 101 103 86   Resp: 18 18 18 18   Temp: 98 2 °F (36 8 °C) 99 8 °F (37 7 °C)  (!) 96 5 °F (35 8 °C)   TempSrc: Temporal Temporal  Temporal   SpO2: 94% 96%  96%   Weight:       Height:           Most recent labs:    No results for input(s): WBC, HGB, HCT, PLT, K, NA, CALCIUM, BUN, CREATININE, LIPASE, AMYLASE, INR, TROPONINI, CKTOTAL, AST, ALT, ALKPHOS, BILITOT in the last 72 hours      Scheduled Meds:  Current Facility-Administered Medications:  acetaminophen 650 mg Oral Q6H PRN Daija Lucero, CRNP   celecoxib 200 mg Oral BID Daija Lucero, CRNP   citalopram 40 mg Oral Daily Daija Lucero, CRNP   clonazePAM 1 5 mg Oral HS Daija Lucero, CRNP   Dalfampridine ER 1 tablet Oral Q12H Daija Lucero, CRNP   enoxaparin 40 mg Subcutaneous Q24H [facility] Franchesca Moss MD   gabapentin 300 mg Oral HS Daija Lucero, SIDDHARTHNP   Interferon Beta-1a 44 mcg Injection Once per day on Mon Wed Fri Daija Benavidesr, CRNP   levETIRAcetam 500 mg Oral BID Daija Lucero, CRNP   lidocaine 1 patch Transdermal Daily Daija Lucero, CRNP   oxyCODONE 20 mg Oral BID Daija Lucero, CRNP   saccharomyces boulardii 250 mg Oral BID Daija Lucero, CRNP   tiZANidine 2 mg Oral HS Franchesca Moss MD     Continuous Infusions:   PRN Meds:   acetaminophen    Surgical procedures (if appropriate):

## 2018-09-10 NOTE — SOCIAL WORK
Cm followed up with referrals sent to STR on Friday by RASHI Russ  [employer] is accepting  Cm reached out to wife Umesh Crawford via phone call to update her  Wife seemed very distraught and seems to be having a difficult time making this STR decision  The phone call ended with wife stating "he can go anywhere besides [facility] and has to be in the [facility] area"  Cm clarified that [employer] met these requirements and cm would being authorization through [employer]  Cm began authorization, reference [id number], also faxed clinicals  Cm will be awaiting authorization then will arrange transport  Pt is medically stable and is only awaiting placement  Cm is following

## 2018-09-10 NOTE — PLAN OF CARE
Problem: OCCUPATIONAL THERAPY ADULT  Goal: Performs self-care activities at highest level of function for planned discharge setting  See evaluation for individualized goals  Treatment Interventions: ADL retraining, Functional transfer training, UE strengthening/ROM, Endurance training, Patient/family training, Equipment evaluation/education, Compensatory technique education, Energy conservation          See flowsheet documentation for full assessment, interventions and recommendations  Limitation: Decreased ADL status, Decreased UE strength, Decreased endurance  Prognosis: Good  Assessment: Pt is a 62y/o male admitted to the hospital after falling down his steps at home; neg acute findings/injuries  Pt with PMH of MS  PTA pt states independence with all aspects of his ADLs, transfers, ambulation--with rollator; +falls=2, +, neg home alone  During initial eval, pt demonstrated deficits with his functional balance, functional mobility, ADL status, activity tolerance(currently fair=15-20mins), b/l UE strength, and transfer safety  Pt would benefit from continued OT tx for the above deficits  3-5xwk/1-2wks        OT Discharge Recommendation: Short Term Rehab

## 2018-09-10 NOTE — OCCUPATIONAL THERAPY NOTE
OccupationalTherapy Evaluation(time=1250-1320)     Patient Name: Iban Jaimes  LRTOZ'U Date: 9/10/2018  Problem List  Patient Active Problem List   Diagnosis    [facility] progressive multiple sclerosis ([facility] )    Depression    Weakness    C  difficile colitis    Oropharyngeal dysphagia    Chronic back pain    Immunocompromised state ([facility] )    Fever, unknown origin    Seizure disorder ([facility] )    Fall down stairs    Lung nodule < 6cm on CT    Neck strain, initial encounter     Past Medical History  Past Medical History:   Diagnosis Date    ANANDA (acute kidney injury) ([place] [address] ) 1/23/2016    Arthritis     lower back    Depression     Multiple sclerosis (HCC)     Psychiatric disorder     depression    Seizures (formerly [facility])     pseudoseizures     Past Surgical History  History reviewed  No pertinent surgical history          09/10/18 1320   Note Type   Note type Eval only   Restrictions/Precautions   Other Precautions Fall Risk;Bed Alarm   Pain Assessment   Pain Assessment No/denies pain  (supine)   Home Living   Type of Home House   Home Layout Two level;Bed/bath upstairs  (13 steps to 2nd, 5 jose alfredo))   Home Equipment Walker;Cane   Prior Function   Lives With Spouse   Lifestyle   Autonomy PTA pt states independence with all aspects of his ADLs, transfers, ambulation--with rollator; +falls=2, +, neg home alone   Reciprocal Relationships 0 children   Service to Others worked for [employer] and as a [employer]    Intrinsic Gratification watching TV   Psychosocial   Psychosocial (WDL) WDL   Subjective   Subjective "I've had therapy services in the past "   ADL   Where Assessed Edge of bed   Eating Assistance 6  Modified independent   Grooming Assistance 6  [address]  Supervision   Supine to Sit 5  Supervision   Sit to Supine 5  Supervision   Transfers   Sit to Stand 4  Minimal assistance   Additional items Assist x 1; Increased time required;Verbal cues   Stand to Sit 4  Minimal assistance   Additional items Assist x 1; Increased time required;Verbal cues   Functional Mobility   Functional Mobility 4  Minimal assistance   Additional Comments x1   Additional items Rolling walker   Balance   Static Sitting Fair +   Dynamic Sitting Fair   Static Standing Fair -   Dynamic Standing Poor +   Activity Tolerance   Activity Tolerance Patient limited by fatigue   Medical Staff Made Aware RASHI dumas   Nurse Made Aware RASHI dumas   RUE Assessment   RUE Assessment WFL   RUE Strength   RUE Overall Strength Within Functional Limits - able to perform ADL tasks with strength  (4+/5 throughout)   LUE Assessment   LUE Assessment WFL   LUE Strength   LUE Overall Strength Within Functional Limits - able to perform ADL tasks with strength  (4+/5 throughout)   Hand Function   Gross Motor Coordination Functional   Fine Motor Coordination Functional   Sensation   Light Touch No apparent deficits   Proprioception   Proprioception No apparent deficits   Vision-Basic Assessment   Current Vision (glasses)   Vision - Complex Assessment   Acuity (impaired)   Perception   Inattention/Neglect Appears intact   Cognition   Overall Cognitive Status WFL   Arousal/Participation Alert   Attention Within functional limits   Orientation Level Oriented X4   Memory Decreased short term memory   Following Commands Follows one step commands without difficulty   Assessment   Limitation Decreased ADL status; Decreased UE strength;Decreased endurance   Prognosis Good   Assessment Pt is a 62y/o male admitted to the hospital after falling down his steps at home; neg acute findings/injuries  Pt with PMH of MS   PTA pt states independence with all aspects of his ADLs, transfers, ambulation--with rollator; +falls=2, +, neg home alone  During initial eval, pt demonstrated deficits with his functional balance, functional mobility, ADL status, activity tolerance(currently fair=15-20mins), b/l UE strength, and transfer safety  Pt would benefit from continued OT tx for the above deficits  3-5xwk/1-2wks  Goals   Patient Goals "to get some strength back "   STG Time Frame 3-5   Short Term Goal #1 Pt will demonstrate improved activity tolerance to good(20-30mins) and standing tolerance to 3-5mins to assist with ADLs  Short Term Goal #2 Pt will independently demonstrate knowledge and application of proper energy conservation techniques 100% of the time  Short Term Goal  Pt will demonstrate mod I with their sit-stand transfers to assist with completion of their LE dressing  LTG Time Frame (5-10days)   Long Term Goal #1 Pt will demonstrate g/g- balance with all functional activities  Long Term Goal #2 Pt will demonstrate mod I with their UE and LE bathing/dresssing  Long Term Goal Pt will demonstrate proper walker/transfer safety 100% of the time  Plan   Treatment Interventions ADL retraining;Functional transfer training;UE strengthening/ROM; Endurance training;Patient/family training;Equipment evaluation/education; Compensatory technique education; Energy conservation   Goal Expiration Date 09/21/18   Treatment Day 0   OT Frequency 3-5x/wk   Recommendation   OT Discharge Recommendation Short Term Rehab   Barthel Index   Feeding 10   Bathing 0   Grooming Score 5   Dressing Score 5   Bladder Score 10   Bowels Score 10   Toilet Use Score 5   Transfers (Bed/Chair) Score 10   Mobility (Level Surface) Score 0   Stairs Score 0   Barthel Index Score 55   Modified Thomas Scale   Modified Thomas Scale 4   Quoc Denise, OT

## 2018-09-10 NOTE — ASSESSMENT & PLAN NOTE
Continue Ampyra and Rebiff for MS  Tizanidine decreased to at bedtime  Report of "change in mental status" last night noted  Currently alert and at baseline  Suspect that episode of "change in mental status" was transient confusion/disorientation when he was awoken from a deep sleep (after receiving nighttime meds)  Low suspicion for seizure/delirium/stroke    Stable for transfer to rehab when available

## 2018-09-10 NOTE — PLAN OF CARE
Problem: PHYSICAL THERAPY ADULT  Goal: Performs mobility at highest level of function for planned discharge setting  See evaluation for individualized goals  Treatment/Interventions: Functional transfer training, LE strengthening/ROM, Elevations, Therapeutic exercise, Endurance training, Patient/family training, Bed mobility, Gait training, Spoke to nursing, Spoke to case management  Equipment Recommended: Ciara Mayo (pt has DME at home)       See flowsheet documentation for full assessment, interventions and recommendations  Outcome: Progressing  Prognosis: Good  Problem List: Decreased strength, Decreased range of motion, Decreased endurance, Impaired balance, Decreased mobility  Assessment: Seen for progression of mobility and strengthening  S for supine to sit, but Yeny to return to supine for LE management  Incrsed time for all mobilty  A to don/doff shoes at EOB and increased time to complete  Tires with ambulation  GAit slowed, decresed foot clearance, step length and tremulous  Returned to bed and completed few LE ther ex  Fatigued following  Cont to recommend STR prior to returning to home in order to optimize safe and independent function  PT will follow and progress  Barriers to Discharge: Inaccessible home environment  Barriers to Discharge Comments: steps  Recommendation: Short-term skilled PT     PT - OK to Discharge: Yes    See flowsheet documentation for full assessment

## 2018-09-10 NOTE — CASE MANAGEMENT
Continued Stay Review    Date:  9/10/2018    Vital Signs: /73 (BP Location: Left arm)   Pulse 86   Temp (!) 96 5 °F (35 8 °C) (Temporal)   Resp 18   Ht 5' 6" (1 676 m)   Wt 64 8 kg (142 lb 13 7 oz)   SpO2 96%   BMI 23 06 kg/m²     Medications:   Scheduled Meds:   Current Facility-Administered Medications:         celecoxib 200 mg Oral BID Virgil Erasmo, CRNP   citalopram 40 mg Oral Daily [place] Silk, CRNP   clonazePAM 1 5 mg Oral HS Virgil Silk, CRNP   Dalfampridine ER 1 tablet Oral Q12H [place] Silk, CRNP   enoxaparin 40 mg Subcutaneous Q24H [address] Miranda Rawls MD   gabapentin 300 mg Oral HS Virgil Erasmo, CRNP   Interferon Beta-1a 44 mcg Injection Once per day on Mon Wed Fri [place] Erasmo, CRNP   levETIRAcetam 500 mg Oral BID Virgil Erasmo, CRNP   lidocaine 1 patch Transdermal Daily Virgil Erasmo, CRNP   oxyCODONE 20 mg Oral BID Virgil Silk, CRNP   saccharomyces boulardii 250 mg Oral BID Virgil Silk, CRNP   tiZANidine 2 mg Oral HS Miranda Rawls MD     Continuous Infusions:    PRN Meds:   acetaminophen    Abnormal Labs/Diagnostic Results:  None from today    Age/Sex: 70 y o  male     Assessment/Plan: Patient does not recall an episode of confusion last night  He still admits feeling weak and not his baseline  Report of "change in mental status" last night noted  Currently alert and at baseline  Suspect that episode of "change in mental status" was transient confusion/disorientation when he was awoken from a deep sleep (after receiving nighttime meds)  Low suspicion for seizure/delirium/stroke  Stable for transfer to rehab when available    Discharge Plan: Awaiting STR          Thank you,  [facility] Utilization Review Department  Phone: [phone];  Fax [phone]  ATTENTION: Please call with any questions or concerns to [phone]  and carefully follow the prompts so that you are directed to the right person  Send all requests for admission clinical reviews, approved or denied determinations and any other requests to fax [phone]   All voicemails are confidential

## 2018-09-10 NOTE — PROGRESS NOTES
Pt noted to have acute change in mental status  Pt difficult to arouse, and once aroused pt becomes combative  Pt oriented to self  Beauty Breath, CRNP notified  See orders  Will continue to monitor

## 2018-09-10 NOTE — PHYSICAL THERAPY NOTE
PT Progress NOTE    70 y o     [id number]    Flank pain [R10 9]    Past Medical History:   Diagnosis Date    ANANDA (acute kidney injury) ([facility] [address] ) 1/23/2016    Arthritis     lower back    Depression     Multiple sclerosis (HCC)     Psychiatric disorder     depression    Seizures (HCC)     pseudoseizures         History reviewed  No pertinent surgical history  09/10/18 1459   Pain Assessment   Pain Score No Pain   Restrictions/Precautions   Weight Bearing Precautions Per Order No   Other Precautions Fall Risk; Chair Alarm; Bed Alarm   General   Chart Reviewed Yes   Response to Previous Treatment Patient with no complaints from previous session  Family/Caregiver Present No   Cognition   Overall Cognitive Status WFL   Subjective   Subjective Agreeable to therapy and going to rhb  Still weak  Bed Mobility   Supine to Sit 5  Supervision   Additional items Increased time required; Bedrails;HOB elevated   Sit to Supine 4  Minimal assistance   Additional items Increased time required;LE management   Additional Comments increased time and A for LE managment to return to bed  A to don/doff sneakers  Transfers   Sit to Stand 4  Minimal assistance   Additional items Assist x 1; Increased time required;Verbal cues   Stand to Sit 4  Minimal assistance   Additional items Increased time required;Verbal cues   Additional Comments + tremlous  Increased time to change positions  Ambulation/Elevation   Gait pattern Decreased foot clearance;Narrow JYOTSNA; Foward flexed; Short stride; Excessively slow   Gait Assistance 4  Minimal assist   Additional items Assist x 1;Verbal cues; Tactile cues   Assistive Device Rolling walker   Distance 20'x2     Balance   Static Sitting Fair +   Dynamic Sitting Fair   Static Standing Fair -   Dynamic Standing Poor +   Ambulatory Poor +   Endurance Deficit   Endurance Deficit Yes   Endurance Deficit Description weakness, fatigue   Activity Tolerance   Activity Tolerance Patient limited by fatigue Medical Staff Made Aware Nsg, OT, CM   Nurse Made Aware yes   Exercises   Quad Sets Supine;10 reps;AROM; Bilateral   Hip Flexion Supine;10 reps;AAROM; Bilateral  (SLR)   Hip Abduction Sitting;10 reps;AROM; Bilateral   Hip Adduction Supine;10 reps;AROM; Bilateral   Ankle Pumps Supine;10 reps;AROM; Bilateral   Assessment   Prognosis Good   Problem List Decreased strength;Decreased range of motion;Decreased endurance; Impaired balance;Decreased mobility   Assessment Seen for progression of mobility and strengthening  S for supine to sit, but Yeny to return to supine for LE management  Incrsed time for all mobilty  A to don/doff shoes at EOB and increased time to complete  Tires with ambulation  GAit slowed, decresed foot clearance, step length and tremulous  Returned to bed and completed few LE ther ex  Fatigued following  Cont to recommend STR prior to returning to home in order to optimize safe and independent function  PT will follow and progress  Barriers to Discharge Inaccessible home environment   Barriers to Discharge Comments steps   Goals   Patient Goals get stronger   STG Expiration Date 09/17/18   Treatment Day 2   Plan   Treatment/Interventions Functional transfer training;LE strengthening/ROM; Elevations; Therapeutic exercise; Endurance training;Patient/family training;Equipment eval/education; Bed mobility;Gait training; Compensatory technique education;Continued evaluation;Spoke to nursing   Progress Slow progress, decreased activity tolerance   PT Frequency Other (Comment)  (5-6x/wk)   Recommendation   Recommendation Short-term skilled PT   Equipment Recommended Walker   PT - OK to Discharge Yes  (to rhb, no to home )       Tyrone Morley, PT

## 2018-09-10 NOTE — SOCIAL WORK
Cm received a phone call from [facility] needing updated PT/OT notes  Cm made PT/OT aware of this  Once these updated notes are in pt's chart, cm will fax H&P and updated PT/OT notes to [facility] when available

## 2018-09-11 VITALS
RESPIRATION RATE: 17 BRPM | WEIGHT: 142.86 LBS | BODY MASS INDEX: 22.96 KG/M2 | DIASTOLIC BLOOD PRESSURE: 80 MMHG | SYSTOLIC BLOOD PRESSURE: 125 MMHG | HEART RATE: 79 BPM | OXYGEN SATURATION: 94 % | TEMPERATURE: 98.3 F | HEIGHT: 66 IN

## 2018-09-11 PROBLEM — F11.20 CONTINUOUS OPIOID DEPENDENCE (HCC): Status: ACTIVE | Noted: 2018-09-11

## 2018-09-11 PROCEDURE — 99217 PR OBSERVATION CARE DISCHARGE MANAGEMENT: CPT | Performed by: PHYSICIAN ASSISTANT

## 2018-09-11 RX ORDER — GABAPENTIN 300 MG/1
300 CAPSULE ORAL
Qty: 30 CAPSULE | Refills: 0 | Status: SHIPPED | OUTPATIENT
Start: 2018-09-11

## 2018-09-11 RX ORDER — CLONAZEPAM 0.5 MG/1
1.5 TABLET ORAL
Qty: 30 TABLET | Refills: 0 | Status: SHIPPED | OUTPATIENT
Start: 2018-09-11 | End: 2018-12-26 | Stop reason: SDUPTHER

## 2018-09-11 RX ORDER — OXYCODONE HCL 20 MG/1
20 TABLET, FILM COATED, EXTENDED RELEASE ORAL 2 TIMES DAILY
Qty: 20 TABLET | Refills: 0 | Status: SHIPPED | OUTPATIENT
Start: 2018-09-11 | End: 2018-09-21

## 2018-09-11 RX ADMIN — CELECOXIB 200 MG: 200 CAPSULE ORAL at 08:30

## 2018-09-11 RX ADMIN — Medication 250 MG: at 08:30

## 2018-09-11 RX ADMIN — LEVETIRACETAM 500 MG: 500 TABLET ORAL at 08:30

## 2018-09-11 RX ADMIN — DALFAMPRIDINE 10 MG: 10 TABLET, FILM COATED, EXTENDED RELEASE ORAL at 00:29

## 2018-09-11 RX ADMIN — DALFAMPRIDINE 10 MG: 10 TABLET, FILM COATED, EXTENDED RELEASE ORAL at 10:56

## 2018-09-11 RX ADMIN — ENOXAPARIN SODIUM 40 MG: 40 INJECTION SUBCUTANEOUS at 08:30

## 2018-09-11 RX ADMIN — OXYCODONE HYDROCHLORIDE 20 MG: 20 TABLET, FILM COATED, EXTENDED RELEASE ORAL at 08:30

## 2018-09-11 RX ADMIN — CITALOPRAM HYDROBROMIDE 40 MG: 20 TABLET ORAL at 08:30

## 2018-09-11 NOTE — PLAN OF CARE
DISCHARGE PLANNING     Discharge to home or other facility with appropriate resources Adequate for Discharge        INFECTION - ADULT     Absence or prevention of progression during hospitalization Adequate for Discharge        Knowledge Deficit     Patient/family/caregiver demonstrates understanding of disease process, treatment plan, medications, and discharge instructions Adequate for Discharge        MUSCULOSKELETAL - ADULT     Maintain or return mobility to safest level of function Adequate for Discharge        PAIN - ADULT     Verbalizes/displays adequate comfort level or baseline comfort level Adequate for Discharge        Potential for Falls     Patient will remain free of falls Adequate for Discharge        Prexisting or High Potential for Compromised Skin Integrity     Skin integrity is maintained or improved Adequate for Discharge        SAFETY ADULT     Maintain or return to baseline ADL function Adequate for Discharge     Maintain or return mobility status to optimal level Adequate for Discharge

## 2018-09-11 NOTE — ASSESSMENT & PLAN NOTE
With persistent ambulatory dysfunction  Mechanical fall on top underlying secondary progressive multiple sclerosis  He continues to be weak, requiring 2 person assist just to go to the commode  He remains a fall risk and definitely not at his baseline    Short-term rehab placement today

## 2018-09-11 NOTE — NURSING NOTE
Patient discharged to [facility]  IV was removed and meds were returned to patient from pharmacy  Discharge instructions were reviewed with patient  Report was called to Joe Triana at [facility]    Patient was transported via wheelchair

## 2018-09-11 NOTE — DISCHARGE SUMMARY
Discharge- Sulma Garcia 1955, 70 y o  male MRN: [id number]    Unit/Bed#: [address] [id number] Encounter: [id number]    Primary Care Provider: Mariel Chan DO   Date and time admitted to hospital: 9/5/2018  8:47 PM  * Fall down stairs   Assessment & Plan    With persistent ambulatory dysfunction  Mechanical fall on top underlying secondary progressive multiple sclerosis  He continues to be weak, requiring 2 person assist just to go to the commode  He remains a fall risk and definitely not at his baseline  Short-term rehab placement today          Secondary progressive multiple sclerosis (HCC)   Assessment & Plan    Continue Ampyra and Rebif for MS, follow up with neurology  Tizanidine to be stopped per patient request; reports baclofen does not help him either          Depression   Assessment & Plan    · Continue Celexa        Oropharyngeal dysphagia   Assessment & Plan    Due to progressive MS  Continue dental soft diet with nectar thick liquids        Seizure disorder ([address] )   Assessment & Plan    · On Keppra and klonipin, continue  · 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" with normal EEGs        Continuous opioid dependence ([address] )   Assessment & Plan    · On oxycodone prior to arrival; Rx provided for STR          Discharging Physician / Practitioner: Ras Franco PA-C  PCP: Mariel Chan DO  Admission Date:   Admission Orders     Ordered        09/06/18 0134  Place in Observation (expected length of stay for this patient is less than two midnights)  Once             Discharge Date: 09/11/18    Disposition:      Short Term Rehab or SNF at [place] [address] (see below)    For Discharges to [facility] SNF:   · [facility] Texted SNF Physician    Reason for Admission: fall down stairs    Discharge Diagnoses:     Please see assessment and plan section above for further details regarding discharge diagnoses       Resolved Problems  Date Reviewed: 9/11/2018    None Consultations During Hospital Stay:  · neurology    Procedures Performed: see below    Medication Adjustments and Discharge Medications:  · Summary of Medication Adjustments made as a result of this hospitalization: none  · Medication Dosing Tapers - Please refer to Discharge Medication List for details on any medication dosing tapers (if applicable to patient)  · Medications being temporarily held (include recommended restart time): none  · Discharge Medication List: See after visit summary for reconciled discharge medications  Wound Care Recommendations:  When applicable, please see wound care section of After Visit Summary  Diet Recommendations at Discharge:  Diet -        Diet Orders            Start     Ordered    09/06/18 0230  Diet Regular; Regular House; Dysphagia 3-Dental Soft; Nectar Thick Liquid  Diet effective now     Comments: Thick liquids   Question Answer Comment   Diet Type Regular    Regular Regular House    Other Restriction(s): Dysphagia 3-Dental Soft    Liquid Modifier Nectar Thick Liquid    RD to adjust diet per protocol? Yes        09/06/18 0229          Instructions for any Catheters / Lines Present at Discharge (including removal date, if applicable): none    Significant Findings / Test Results:     CT chest abdomen pelvis w contrast   Final Result by Meron Rodríguez MD (09/05 0976)      1  No acute traumatic injury identified within the chest, abdomen or pelvis  2   There is a 1 cm nodule in the left lower lobe  Based on current Fleischner Society 2017 Guidelines on incidental pulmonary nodule, either PET/CT scan evaluation, tissue sampling or short term interval followup non-contrast CT followup (initially in 3    months) may be considered appropriate  3   Slight interval increase in size of nonspecific sclerotic lesions in the left iliac wing and L2 vertebral body since 2015 these may be benign bone islands given slow growth        I personally discussed this study with Stefan Hardin MD on 9/5/2018 at 11:21 PM                Workstation performed: [id number]         CT cervical spine without contrast   Final Result by Jamie Enriquez MD (09/05 7548)      No cervical spine fracture or traumatic malalignment  Workstation performed: [id number]         CT head without contrast   Final Result by Jamie Enriquez MD (09/05 1532)         1  No acute intracranial abnormality  Soft tissue swelling along the left posterior occiput  2   Periventricular white matter focal hyperattenuating lesions corresponding to findings on recent MRI and consistent with known multiple sclerosis  Workstation performed: [id number]           Incidental Findings:   · Lung nodule    Test Results Pending at Discharge (will require follow up):   · none     Outpatient Tests Requested:  · Ct scan 3 months    Complications:  Need rehab    Hospital Course:     Elsa Jenkins is a 70 y o  male patient who originally presented to the hospital on 9/5/2018 due to mechanical fall down the stairs  Patient has underlying history of multiple sclerosis and unfortunately was noted to be weaker than his baseline following this event  He did not sustain any fractures  He was seen in consultation by Neurology and remained on his normal multiple sclerosis medications  He was placed on muscle spasm medications for cervical strain including Zanaflex and baclofen  Patient however is asking for them to be discontinued as he feels they make him confused and sleepy  He was seen by physical and occupational therapy and recommended for short-term rehab  Unfortunately his insurance company mandated that he remain on observation despite the fact that he was in the hospital for 6 nights  He will be discharged to short-term rehab today and should follow up with Neurology as an outpatient      Condition at Discharge: stable     Discharge Day Visit / Exam:     Subjective:  Patient asks that Zanaflex be removed from his list of meds because it makes him sleepy  He is not reporting any complaints of pain to me at this time  Vitals: Blood Pressure: 125/80 (09/11/18 0723)  Pulse: 79 (09/11/18 0723)  Temperature: 98 3 °F (36 8 °C) (09/11/18 0723)  Temp Source: Temporal (09/11/18 0723)  Respirations: 17 (09/11/18 0723)  Height: 5' 6" (167 6 cm) (09/06/18 0255)  Weight - Scale: 64 8 kg (142 lb 13 7 oz) (09/06/18 0255)  SpO2: 94 % (09/11/18 0723)  Exam:   Physical Exam   Constitutional: No distress  Laying comfortably in bed in no distress   HENT:   Head: Normocephalic and atraumatic  Eyes: Conjunctivae are normal  Right eye exhibits no discharge  Left eye exhibits no discharge  No scleral icterus  Neck: Neck supple  Cardiovascular: Normal rate, regular rhythm and normal heart sounds  No murmur heard  Pulmonary/Chest: Effort normal and breath sounds normal  No respiratory distress  He has no wheezes  He has no rales  Abdominal: Soft  Bowel sounds are normal  He exhibits no distension  There is no tenderness  There is no rebound  Musculoskeletal: He exhibits no edema  Neurological: He is alert  Awake alert, interactive no tremor   Skin: Skin is warm and dry  No rash noted  He is not diaphoretic  No erythema  No pallor  Psychiatric: He has a normal mood and affect  His behavior is normal  Judgment and thought content normal    Vitals reviewed  Discussion with Family:  Called patient's wife with medical update    Goals of Care Discussions:  · Code Status at Discharge: Level 3 - DNAR and DNI  · Were there any Goals of Care Discussions during Hospitalization?: No  · Results of any General Goals of Care Discussions:    · POLST Completed: No   · If POLST Completed, Summary of POLST Agreement Provided Here:    · OK to Rehospitalize if Needed?  Yes    Discharge instructions/Information to patient and family:   See after visit summary section titled Discharge Instructions for information provided to patient and family  Planned Readmission: none      Discharge Statement:  I spent 40 minutes discharging the patient  This time was spent on the day of discharge  I had direct contact with the patient on the day of discharge  Greater than 50% of the total time was spent examining patient, answering all patient questions, arranging and discussing plan of care with patient as well as directly providing post-discharge instructions  Additional time then spent on discharge activities   Spoke with my attending and nursing    ** Please Note: This note has been constructed using a voice recognition system **

## 2018-09-11 NOTE — ASSESSMENT & PLAN NOTE
Continue Ampyra and Rebif for MS, follow up with neurology  Tizanidine to be stopped per patient request; reports baclofen does not help him either

## 2018-09-11 NOTE — SOCIAL WORK
Cm received call this morning from Laz Sorenson from [facility], pt has been approved for STR at [facility]  Auth # [id number], next review date of 9/17  Cm set up w/c [place] [facility] transport for 11am today, pt is aware of OOP and states he will let his wife know of transport time  Carlos made RN, MD and pt aware of this information

## 2018-09-12 ENCOUNTER — DOCUMENTATION (OUTPATIENT)
Dept: NEUROLOGY | Facility: CLINIC | Age: 63
End: 2018-09-12

## 2018-09-12 NOTE — PROGRESS NOTES
Received fax from [facility] requesting additional clinical documentation for PA  Faxed to [phone]

## 2018-09-18 ENCOUNTER — TELEPHONE (OUTPATIENT)
Dept: NEUROLOGY | Facility: CLINIC | Age: 63
End: 2018-09-18
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
## 2018-10-16 ENCOUNTER — OFFICE VISIT (OUTPATIENT)
Dept: NEUROLOGY | Facility: CLINIC | Age: 63
End: 2018-10-16
Payer: COMMERCIAL

## 2018-10-16 VITALS
WEIGHT: 135.6 LBS | BODY MASS INDEX: 21.89 KG/M2 | HEART RATE: 109 BPM | SYSTOLIC BLOOD PRESSURE: 123 MMHG | RESPIRATION RATE: 18 BRPM | DIASTOLIC BLOOD PRESSURE: 72 MMHG

## 2018-10-16 DIAGNOSIS — G35 MULTIPLE SCLEROSIS (HCC): Primary | ICD-10-CM

## 2018-10-16 PROCEDURE — 99214 OFFICE O/P EST MOD 30 MIN: CPT | Performed by: PSYCHIATRY & NEUROLOGY

## 2018-10-16 RX ORDER — TIZANIDINE 4 MG/1
4 TABLET ORAL
Refills: 0 | COMMUNITY
Start: 2018-10-09
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
## 2018-10-17 ENCOUNTER — TELEPHONE (OUTPATIENT)
Dept: NEUROLOGY | Facility: CLINIC | Age: 63
End: 2018-10-17

## 2018-10-17 NOTE — TELEPHONE ENCOUNTER
Pt seen in office yesterday for ms but also post fall down 15 steps    Pt reminded at appt to follow up with pcp for his abn ct chest abd and pelvis as he was unaware of a 1 cm left lung nodule found on this study during trauma eval  Please send copy of ct abd chest pelvis to pcp office and alert pcp office of these abn findings and need for follow up testing

## 2018-10-17 NOTE — TELEPHONE ENCOUNTER
Please call pt re looking into a stair glide for him  Pt had fall in sept down 15 steps at home  Please see about grants or help with getting agency out to his home  Also would like a home safety eval for him to see if any areas in the home could be better equiped for him and his rolling walker    ie grab bars, raillings etc

## 2018-10-17 NOTE — TELEPHONE ENCOUNTER
Writer spoke with PT [facility] and discussed patients case with them  Given patient has outpatient PT, patient would not qualify for home care which would do the assessment  They reported the home safety assessment is something that should have been done by inpatient services prior to his discharge  They reported they have no one they refer outpatient patients to for a home assessment  They will verify patients equipment is safe during their appt but will not go to the home to do checks  Writer contacted other  to discuss a resource she has used in past  She reported that there are 2 places that can provide a home safety assessment but they are costly, not covered by insurance  [employer] [facility]- 250$    [facility] - $300 for 6 hours and then 95$ for any hour after that  Writer will call patient to discuss these options

## 2018-10-17 NOTE — TELEPHONE ENCOUNTER
Writer completed letter for ms foundation for [employer] for stair lift  Awaiting Dr Kvng Pena to sign form and letter  Will call to discuss options about home assessment companies, as well as potential help for stair lift  This includes calling ms society and and completing phone assessment to see if they have funding or resources to connect patient to

## 2018-10-17 NOTE — TELEPHONE ENCOUNTER
Called Dr Candido Byrd office and made them aware of abnormal findings  Routed results to [phone]  They will follow up with patient

## 2018-10-19 NOTE — TELEPHONE ENCOUNTER
Writer attempted to contact patient  Spoke to patients wife  Informed her of the information being sent to her  Informed her application for MS foundation is signed by Dr Valentín Tompkins, to complete and return to them  Also informed her ms society ms navigator card, call to complete assessment via phone to see if they have funding or grants for stair lift  Discussed different ways to help  She reported patient already has med grants  Informed her she can contact her doctors to see if any of her meds have a PAP to help free up money for them  She said she could look into that for herself  Writer placed this information in mail with card  Will contact office if they need further assistance

## 2018-10-30 ENCOUNTER — APPOINTMENT (OUTPATIENT)
Dept: LAB | Facility: MEDICAL CENTER | Age: 63
End: 2018-10-30
Payer: COMMERCIAL

## 2018-10-30 DIAGNOSIS — G35 MULTIPLE SCLEROSIS (HCC): ICD-10-CM

## 2018-10-30 LAB
ALBUMIN SERPL BCP-MCNC: 3.7 G/DL (ref 3.5–5)
ALP SERPL-CCNC: 91 U/L (ref 46–116)
ALT SERPL W P-5'-P-CCNC: 27 U/L (ref 12–78)
AST SERPL W P-5'-P-CCNC: 25 U/L (ref 5–45)
BASOPHILS # BLD AUTO: 0.07 THOUSANDS/ΜL (ref 0–0.1)
BASOPHILS NFR BLD AUTO: 1 % (ref 0–1)
BILIRUB DIRECT SERPL-MCNC: 0.11 MG/DL (ref 0–0.2)
BILIRUB SERPL-MCNC: 0.39 MG/DL (ref 0.2–1)
EOSINOPHIL # BLD AUTO: 0.24 THOUSAND/ΜL (ref 0–0.61)
EOSINOPHIL NFR BLD AUTO: 4 % (ref 0–6)
ERYTHROCYTE [DISTWIDTH] IN BLOOD BY AUTOMATED COUNT: 12.2 % (ref 11.6–15.1)
HCT VFR BLD AUTO: 38.2 % (ref 36.5–49.3)
HGB BLD-MCNC: 12.1 G/DL (ref 12–17)
IMM GRANULOCYTES # BLD AUTO: 0.01 THOUSAND/UL (ref 0–0.2)
IMM GRANULOCYTES NFR BLD AUTO: 0 % (ref 0–2)
LYMPHOCYTES # BLD AUTO: 1.11 THOUSANDS/ΜL (ref 0.6–4.47)
LYMPHOCYTES NFR BLD AUTO: 18 % (ref 14–44)
MCH RBC QN AUTO: 30.8 PG (ref 26.8–34.3)
MCHC RBC AUTO-ENTMCNC: 31.7 G/DL (ref 31.4–37.4)
MCV RBC AUTO: 97 FL (ref 82–98)
MONOCYTES # BLD AUTO: 0.77 THOUSAND/ΜL (ref 0.17–1.22)
MONOCYTES NFR BLD AUTO: 13 % (ref 4–12)
NEUTROPHILS # BLD AUTO: 3.97 THOUSANDS/ΜL (ref 1.85–7.62)
NEUTS SEG NFR BLD AUTO: 64 % (ref 43–75)
NRBC BLD AUTO-RTO: 0 /100 WBCS
PLATELET # BLD AUTO: 284 THOUSANDS/UL (ref 149–390)
PMV BLD AUTO: 10.3 FL (ref 8.9–12.7)
PROT SERPL-MCNC: 8.2 G/DL (ref 6.4–8.2)
RBC # BLD AUTO: 3.93 MILLION/UL (ref 3.88–5.62)
WBC # BLD AUTO: 6.17 THOUSAND/UL (ref 4.31–10.16)

## 2018-10-30 PROCEDURE — 36415 COLL VENOUS BLD VENIPUNCTURE: CPT

## 2018-10-30 PROCEDURE — 80076 HEPATIC FUNCTION PANEL: CPT

## 2018-10-30 PROCEDURE — 85025 COMPLETE CBC W/AUTO DIFF WBC: CPT

## 2018-12-01 DIAGNOSIS — G40.909 SEIZURE DISORDER (HCC): ICD-10-CM

## 2018-12-03 RX ORDER — LEVETIRACETAM 500 MG/1
TABLET ORAL
Qty: 60 TABLET | Refills: 5 | Status: SHIPPED | OUTPATIENT
Start: 2018-12-03

## 2018-12-12 DIAGNOSIS — G35 MULTIPLE SCLEROSIS (HCC): ICD-10-CM

## 2018-12-12 RX ORDER — GABAPENTIN 300 MG/1
CAPSULE ORAL
Qty: 90 CAPSULE | Refills: 1 | Status: SHIPPED | OUTPATIENT
Start: 2018-12-12

## 2018-12-22 DIAGNOSIS — G35 MULTIPLE SCLEROSIS (HCC): ICD-10-CM

## 2018-12-24 RX ORDER — CLONAZEPAM 0.5 MG/1
TABLET ORAL
Qty: 270 TABLET | Refills: 0 | OUTPATIENT
Start: 2018-12-24

## 2018-12-26 ENCOUNTER — TELEPHONE (OUTPATIENT)
Dept: NEUROLOGY | Facility: CLINIC | Age: 63
End: 2018-12-26
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
## 2018-12-26 RX ORDER — CLONAZEPAM 0.5 MG/1
1.5 TABLET ORAL
Qty: 270 TABLET | Refills: 0 | Status: SHIPPED | OUTPATIENT
Start: 2018-12-26 | End: 2018-12-26 | Stop reason: SDUPTHER

## 2018-12-26 RX ORDER — CLONAZEPAM 0.5 MG/1
1.5 TABLET ORAL
Qty: 270 TABLET | Refills: 0 | Status: SHIPPED | OUTPATIENT
Start: 2018-12-26

## 2018-12-26 NOTE — TELEPHONE ENCOUNTER
I actuially did not see anywhere in the notes that clonazepam was being discontinued during that hospitalization  Dr Keila Bowman saw him after that and did not say anything about that med  I sent a refill to his pharm    I also reviewed the PAPDMP

## 2018-12-26 NOTE — TELEPHONE ENCOUNTER
Pt called for a refill on his clonazepam 0 5 mg 3 at bedtime  This was d/c'd during his last hospital day, he was given a 10 day supply it looks like  Please advise if you'd like to refill?

## 2018-12-26 NOTE — TELEPHONE ENCOUNTER
Called and advised of the below  Pt states that he never stopped taking klonopin, "I have been taking clonazepam 0 5 mg 3 tablets at bedtime"  He had some leftover from old script that Dr Elizabeth Rodriguez prescribed  Pt has been out of klonopin for 3 days  Requesting rx be sent to pharmacy  Called [facility] pharmacy, spoke w/Elder-states that Klonopin last filled was 8/31/18 (90 day supply)

## 2018-12-26 NOTE — TELEPHONE ENCOUNTER
Is there is a reason he wants them back all of a sudden?   Would ideally not like to restart this med if he hasn't been taking it x 3 months

## 2019-01-21 ENCOUNTER — OFFICE VISIT (OUTPATIENT)
Dept: NEUROLOGY | Facility: CLINIC | Age: 64
End: 2019-01-21
Payer: COMMERCIAL

## 2019-01-21 VITALS
RESPIRATION RATE: 16 BRPM | WEIGHT: 137 LBS | DIASTOLIC BLOOD PRESSURE: 68 MMHG | HEART RATE: 79 BPM | BODY MASS INDEX: 22.11 KG/M2 | SYSTOLIC BLOOD PRESSURE: 138 MMHG

## 2019-01-21 DIAGNOSIS — G35 MULTIPLE SCLEROSIS (HCC): ICD-10-CM

## 2019-01-21 DIAGNOSIS — G40.909 SEIZURE DISORDER (HCC): ICD-10-CM

## 2019-01-21 DIAGNOSIS — G35 MULTIPLE SCLEROSIS (HCC): Primary | ICD-10-CM

## 2019-01-21 PROCEDURE — 99214 OFFICE O/P EST MOD 30 MIN: CPT | Performed by: PHYSICIAN ASSISTANT

## 2019-01-21 RX ORDER — HYDROCODONE BITARTRATE AND ACETAMINOPHEN 7.5; 325 MG/1; MG/1
TABLET ORAL
Refills: 0 | COMMUNITY
Start: 2018-12-04

## 2019-01-21 RX ORDER — DALFAMPRIDINE 10 MG/1
TABLET, FILM COATED, EXTENDED RELEASE ORAL
Qty: 60 TABLET | Refills: 3 | Status: SHIPPED | OUTPATIENT
Start: 2019-01-21

## 2019-01-21 NOTE — PATIENT INSTRUCTIONS
Continue Rebif  Labs were updated in October and stable    Will have you repeat labs again in 3 months before you come back  Continue Keppra 500mg twice a day  Would make sure to keep up with home exercise program   Follow up in 4 months  Call for any new symptoms

## 2019-01-21 NOTE — PROGRESS NOTES
Patient ID: Carmel Olivas is a 75 y o  male  Assessment/Plan:    Multiple sclerosis ([facility] [address] )  Patient remains on Rebif and tolerating well  He continues to recover from his fall in Sept 2018  He finished PT and OT  He has not been as consistent with his home exercise program   He continues to have some issues with going up the stairs  Our  assisted him with paperwork for a stair glide  He denies any new symptoms at this time  Labs were done in Oct 2018 with normal CBC and LFTs  MRI brain from June 2018 was stable  Exam is stable today  Discussed he should maintain a regular exercise program   Will see him back in 4 months or sooner if needed  Seizure disorder ([facility] [address] )  Remains stable on keppra 500mg BID, no reported breakthrough seizures       Diagnoses and all orders for this visit:    Multiple sclerosis ([facility] [address] )  -     CBC and differential; Future  -     Comprehensive metabolic panel; Future    Seizure disorder ([facility] [address] )    Other orders         Subjective:    HPI    Patient is a 75year old male with PMH of MS who presents today for neurology follow up  He was last seen in Sept 2018  Patient was diagnosed with MS in [facility], however he had symptoms for many years prior  He has been on Novantrone and Avonex in the past for his MS  He was switched to Tecfidera at the end of 2013 and had to come off due to low absolute lymphs at 0 39  He has been off since March 2015  He is currently on Rebif, which was started in June 2015  He has tolerated the Rebif well  There was also question of seizure activity during one of his admits in 2015  His Keppra had been increased to 1000mg BID at that time, but then lowered back down to 750mg BID  His Keppra was decreased again in June 2018 due to elevated Keppra level in the setting of decreased renal function  No reported breakthrough seizures on Keppra 500mg BID  He has remained on Ampyra  He has tried to stop it in the past, but walking got worse   He is aware of increased risk of seizures with Ampyra  It has been discussed that he is most likely SPMS now  Today, patient reports he is overall doing well  As noted at last visit, he had a major fall in Sept 2018  He is finished with PT and OT  He continues to have trouble going up stairs and they are looking into a stair glide  Our  assisted them with paperwork for this  He denies any new symptoms at this time  He is using his rolling walker, no additional falls  Last labs were done in Oct 2018 and stable  The following portions of the patient's history were reviewed and updated as appropriate: current medications, past family history, past medical history, past social history, past surgical history and problem list          Objective:    Blood pressure 138/68, pulse 79, resp  rate 16, weight 62 1 kg (137 lb)  Physical Exam   Constitutional: He appears well-developed and well-nourished  HENT:   Head: Normocephalic and atraumatic  Eyes: Pupils are equal, round, and reactive to light  EOM are normal    Cardiovascular: Intact distal pulses  Pulmonary/Chest: Effort normal    Neurological: He has normal reflexes  Coordination normal    Skin: Skin is warm and dry  Psychiatric: He has a normal mood and affect  His speech is normal        Neurological Exam  Mental Status   Oriented to person, place, time and situation  Recent and remote memory are intact  Speech is normal  Language is fluent with no aphasia  Attention and concentration are normal     Cranial Nerves  CN II: Visual fields full to confrontation  CN III, IV, VI: Extraocular movements intact bilaterally  Pupils equal round and reactive to light bilaterally  CN V: Facial sensation is normal   CN VII: Full and symmetric facial movement  CN VIII: Hearing is normal   CN IX, X: Palate elevates symmetrically  Normal gag reflex    CN XI: Shoulder shrug strength is normal   CN XII: Tongue midline without atrophy or fasciculations  Motor   Slight tremor of outstretched arms   Right                     Left   Shoulder abduction               5                          5  Elbow flexion                         5                          5  Elbow extension                    5                          5  Hip flexion                              4+                          5-  Dorsiflexion                            5-                          5    Sensory  Sensation is intact to light touch, pinprick, vibration and proprioception in all four extremities  Reflexes  Deep tendon reflexes are 2+ and symmetric in all four extremities with downgoing toes bilaterally  Coordination  Finger-to-nose, rapid alternating movements and heel-to-shin normal bilaterally without dysmetria  Gait  Slightly ataxic, using rollator   ROS:    Review of Systems   Constitutional: Negative  Negative for appetite change and fever  HENT: Negative  Negative for hearing loss, tinnitus, trouble swallowing and voice change  Eyes: Negative  Negative for photophobia and pain  Respiratory: Negative  Negative for shortness of breath  Cardiovascular: Negative  Negative for palpitations  Gastrointestinal: Negative  Negative for nausea and vomiting  Endocrine: Negative  Negative for cold intolerance and heat intolerance  Genitourinary: Negative  Negative for dysuria, frequency and urgency  Musculoskeletal: Negative  Negative for myalgias and neck pain  Skin: Negative  Negative for rash  Neurological: Negative  Negative for dizziness, tremors, seizures, syncope, facial asymmetry, speech difficulty, weakness, light-headedness, numbness and headaches  Hematological: Negative  Does not bruise/bleed easily  Psychiatric/Behavioral: Negative  Negative for confusion, hallucinations and sleep disturbance       I personally reviewed and updated the ROS as appropriate

## 2019-02-19 ENCOUNTER — TELEPHONE (OUTPATIENT)
Dept: NEUROLOGY | Facility: CLINIC | Age: 64
End: 2019-02-19

## 2019-02-19 NOTE — TELEPHONE ENCOUNTER
Just received a call from Uma Macdonald, [place] with [facility] AT THE [facility] trauma team   Patient there currently due to a fall, has broken ribs as well as subdural/subarachnoid hemorrhage  They are managing  He was just confirming his neuro meds, did review my note from last month  Confirmed patient on Rebif, Ampyra and Keppra 500mg BID  Uma Macdonald said patient was unsure about the Ampyra  Explained that to my knowledge he was still taking it, was just refilled recently  Also suggested he confirm with patient's wife if actively taking  He thanked me and said Jose Rendon will stay there for treatment

## 2019-05-21 ENCOUNTER — TELEPHONE (OUTPATIENT)
Dept: NEUROLOGY | Facility: CLINIC | Age: 64
End: 2019-05-21

## 2020-06-18 ENCOUNTER — TELEPHONE (OUTPATIENT)
Dept: NEUROLOGY | Facility: CLINIC | Age: 65
End: 2020-06-18